# Patient Record
Sex: FEMALE | Race: OTHER | HISPANIC OR LATINO
[De-identification: names, ages, dates, MRNs, and addresses within clinical notes are randomized per-mention and may not be internally consistent; named-entity substitution may affect disease eponyms.]

---

## 2023-01-01 ENCOUNTER — APPOINTMENT (OUTPATIENT)
Dept: INTERNAL MEDICINE | Facility: OTHER | Age: 43
End: 2023-01-01
Payer: SUBSIDIZED

## 2023-01-01 ENCOUNTER — NON-APPOINTMENT (OUTPATIENT)
Age: 43
End: 2023-01-01

## 2023-01-01 ENCOUNTER — APPOINTMENT (OUTPATIENT)
Dept: RHEUMATOLOGY | Facility: CLINIC | Age: 43
End: 2023-01-01

## 2023-01-01 ENCOUNTER — APPOINTMENT (OUTPATIENT)
Dept: INTERNAL MEDICINE | Facility: OTHER | Age: 43
End: 2023-01-01
Payer: MEDICAID

## 2023-01-01 ENCOUNTER — APPOINTMENT (OUTPATIENT)
Dept: MAMMOGRAPHY | Facility: HOSPITAL | Age: 43
End: 2023-01-01

## 2023-01-01 VITALS
HEART RATE: 102 BPM | SYSTOLIC BLOOD PRESSURE: 125 MMHG | RESPIRATION RATE: 15 BRPM | OXYGEN SATURATION: 98 % | HEIGHT: 68 IN | WEIGHT: 224 LBS | BODY MASS INDEX: 33.95 KG/M2 | DIASTOLIC BLOOD PRESSURE: 80 MMHG

## 2023-01-01 VITALS
HEART RATE: 72 BPM | OXYGEN SATURATION: 98 % | SYSTOLIC BLOOD PRESSURE: 135 MMHG | BODY MASS INDEX: 34.8 KG/M2 | HEIGHT: 69 IN | DIASTOLIC BLOOD PRESSURE: 85 MMHG | WEIGHT: 235 LBS | RESPIRATION RATE: 18 BRPM

## 2023-01-01 DIAGNOSIS — Z63.5 DISRUPTION OF FAMILY BY SEPARATION AND DIVORCE: ICD-10-CM

## 2023-01-01 DIAGNOSIS — M05.20 RHEUMATOID VASCULITIS WITH RHEUMATOID ARTHRITIS OF UNSPECIFIED SITE: ICD-10-CM

## 2023-01-01 DIAGNOSIS — I01.1 ACUTE RHEUMATIC ENDOCARDITIS: ICD-10-CM

## 2023-01-01 DIAGNOSIS — M06.9 RHEUMATOID ARTHRITIS, UNSPECIFIED: ICD-10-CM

## 2023-01-01 DIAGNOSIS — Z78.9 OTHER SPECIFIED HEALTH STATUS: ICD-10-CM

## 2023-01-01 PROCEDURE — 99204 OFFICE O/P NEW MOD 45 MIN: CPT

## 2023-01-01 PROCEDURE — ZZZZZ: CPT

## 2023-01-01 RX ORDER — LORATADINE 5 MG
17 TABLET,CHEWABLE ORAL
Qty: 1 | Refills: 0 | Status: ACTIVE | COMMUNITY
Start: 2023-01-01 | End: 1900-01-01

## 2023-01-01 RX ORDER — VITAMIN K2 90 MCG
1000 CAPSULE ORAL
Qty: 90 | Refills: 0 | Status: ACTIVE | COMMUNITY
Start: 2023-01-01 | End: 1900-01-01

## 2023-01-01 SDOH — SOCIAL STABILITY - SOCIAL INSECURITY: DISRUPTION OF FAMILY BY SEPARATION AND DIVORCE: Z63.5

## 2023-09-21 PROBLEM — M05.20 RHEUMATOID ARTERITIS: Status: ACTIVE | Noted: 2023-01-01

## 2023-09-21 PROBLEM — Z78.9 DOES NOT USE ILLICIT DRUGS: Status: ACTIVE | Noted: 2023-01-01

## 2023-09-21 PROBLEM — Z63.5 DIVORCED: Status: ACTIVE | Noted: 2023-01-01

## 2023-09-21 PROBLEM — I01.1 RHEUMATOID AORTITIS: Status: ACTIVE | Noted: 2023-01-01

## 2023-10-23 PROBLEM — M06.9 RHEUMATOID ARTHRITIS: Status: ACTIVE | Noted: 2023-01-01

## 2024-01-01 ENCOUNTER — NON-APPOINTMENT (OUTPATIENT)
Age: 44
End: 2024-01-01

## 2024-01-01 ENCOUNTER — INPATIENT (INPATIENT)
Facility: HOSPITAL | Age: 44
LOS: 2 days | DRG: 917 | End: 2024-05-22
Attending: INTERNAL MEDICINE | Admitting: INTERNAL MEDICINE
Payer: MEDICAID

## 2024-01-01 ENCOUNTER — APPOINTMENT (OUTPATIENT)
Dept: INTERNAL MEDICINE | Facility: OTHER | Age: 44
End: 2024-01-01
Payer: SUBSIDIZED

## 2024-01-01 VITALS — DIASTOLIC BLOOD PRESSURE: 40 MMHG | HEART RATE: 73 BPM | SYSTOLIC BLOOD PRESSURE: 76 MMHG | OXYGEN SATURATION: 91 %

## 2024-01-01 VITALS
HEIGHT: 68 IN | TEMPERATURE: 98.4 F | SYSTOLIC BLOOD PRESSURE: 125 MMHG | RESPIRATION RATE: 14 BRPM | HEART RATE: 84 BPM | OXYGEN SATURATION: 97 % | DIASTOLIC BLOOD PRESSURE: 80 MMHG

## 2024-01-01 VITALS
DIASTOLIC BLOOD PRESSURE: 64 MMHG | OXYGEN SATURATION: 99 % | HEART RATE: 83 BPM | RESPIRATION RATE: 14 BRPM | SYSTOLIC BLOOD PRESSURE: 120 MMHG

## 2024-01-01 VITALS
DIASTOLIC BLOOD PRESSURE: 85 MMHG | TEMPERATURE: 97.9 F | OXYGEN SATURATION: 98 % | HEART RATE: 85 BPM | RESPIRATION RATE: 14 BRPM | SYSTOLIC BLOOD PRESSURE: 130 MMHG

## 2024-01-01 DIAGNOSIS — Z00.00 ENCOUNTER FOR GENERAL ADULT MEDICAL EXAMINATION W/OUT ABNORMAL FINDINGS: ICD-10-CM

## 2024-01-01 DIAGNOSIS — J45.909 UNSPECIFIED ASTHMA, UNCOMPLICATED: ICD-10-CM

## 2024-01-01 DIAGNOSIS — I10 ESSENTIAL (PRIMARY) HYPERTENSION: ICD-10-CM

## 2024-01-01 DIAGNOSIS — S76.011A STRAIN OF MUSCLE, FASCIA AND TENDON OF RIGHT HIP, INITIAL ENCOUNTER: ICD-10-CM

## 2024-01-01 DIAGNOSIS — K59.00 CONSTIPATION, UNSPECIFIED: ICD-10-CM

## 2024-01-01 DIAGNOSIS — E78.5 HYPERLIPIDEMIA, UNSPECIFIED: ICD-10-CM

## 2024-01-01 DIAGNOSIS — K21.9 GASTRO-ESOPHAGEAL REFLUX DISEASE W/OUT ESOPHAGITIS: ICD-10-CM

## 2024-01-01 DIAGNOSIS — M79.7 FIBROMYALGIA: ICD-10-CM

## 2024-01-01 DIAGNOSIS — D50.9 IRON DEFICIENCY ANEMIA, UNSPECIFIED: ICD-10-CM

## 2024-01-01 DIAGNOSIS — Z11.3 ENCOUNTER FOR SCREENING FOR INFECTIONS WITH A PREDOMINANTLY SEXUAL MODE OF TRANSMISSION: ICD-10-CM

## 2024-01-01 DIAGNOSIS — G47.00 INSOMNIA, UNSPECIFIED: ICD-10-CM

## 2024-01-01 DIAGNOSIS — F31.9 BIPOLAR DISORDER, UNSPECIFIED: ICD-10-CM

## 2024-01-01 DIAGNOSIS — F41.9 ANXIETY DISORDER, UNSPECIFIED: ICD-10-CM

## 2024-01-01 DIAGNOSIS — F32.A ANXIETY DISORDER, UNSPECIFIED: ICD-10-CM

## 2024-01-01 DIAGNOSIS — J02.9 ACUTE PHARYNGITIS, UNSPECIFIED: ICD-10-CM

## 2024-01-01 LAB
-  BLOOD PCR PANEL: SIGNIFICANT CHANGE UP
-  STAPHYLOCOCCUS EPIDERMIDIS: SIGNIFICANT CHANGE UP
A1C WITH ESTIMATED AVERAGE GLUCOSE RESULT: 5.1 % — SIGNIFICANT CHANGE UP (ref 4–5.6)
ADD ON TEST-SPECIMEN IN LAB: SIGNIFICANT CHANGE UP
ALBUMIN SERPL ELPH-MCNC: 2.4 G/DL — LOW (ref 3.3–5)
ALBUMIN SERPL ELPH-MCNC: 2.7 G/DL — LOW (ref 3.3–5)
ALBUMIN SERPL ELPH-MCNC: 2.8 G/DL — LOW (ref 3.3–5)
ALBUMIN SERPL ELPH-MCNC: 3.1 G/DL — LOW (ref 3.3–5)
ALBUMIN SERPL ELPH-MCNC: 3.2 G/DL — LOW (ref 3.3–5)
ALBUMIN SERPL ELPH-MCNC: 3.3 G/DL — SIGNIFICANT CHANGE UP (ref 3.3–5)
ALBUMIN SERPL ELPH-MCNC: 3.3 G/DL — SIGNIFICANT CHANGE UP (ref 3.3–5)
ALBUMIN SERPL ELPH-MCNC: 4.1 G/DL — SIGNIFICANT CHANGE UP (ref 3.3–5)
ALP SERPL-CCNC: 61 U/L — SIGNIFICANT CHANGE UP (ref 40–120)
ALP SERPL-CCNC: 62 U/L — SIGNIFICANT CHANGE UP (ref 40–120)
ALP SERPL-CCNC: 62 U/L — SIGNIFICANT CHANGE UP (ref 40–120)
ALP SERPL-CCNC: 71 U/L — SIGNIFICANT CHANGE UP (ref 40–120)
ALP SERPL-CCNC: 78 U/L — SIGNIFICANT CHANGE UP (ref 40–120)
ALP SERPL-CCNC: 79 U/L — SIGNIFICANT CHANGE UP (ref 40–120)
ALP SERPL-CCNC: 81 U/L — SIGNIFICANT CHANGE UP (ref 40–120)
ALP SERPL-CCNC: 99 U/L — SIGNIFICANT CHANGE UP (ref 40–120)
ALT FLD-CCNC: 183 U/L — HIGH (ref 10–45)
ALT FLD-CCNC: 200 U/L — HIGH (ref 10–45)
ALT FLD-CCNC: 204 U/L — HIGH (ref 10–45)
ALT FLD-CCNC: 262 U/L — HIGH (ref 10–45)
ALT FLD-CCNC: 274 U/L — HIGH (ref 10–45)
ALT FLD-CCNC: 280 U/L — HIGH (ref 10–45)
ALT FLD-CCNC: 330 U/L — HIGH (ref 10–45)
ALT FLD-CCNC: 446 U/L — HIGH (ref 10–45)
AMMONIA BLD-MCNC: 104 UMOL/L — HIGH (ref 11–55)
AMMONIA BLD-MCNC: 79 UMOL/L — HIGH (ref 11–55)
AMORPH URATE CRY # URNS: PRESENT
AMPHET UR-MCNC: NEGATIVE — SIGNIFICANT CHANGE UP
AMYLASE P1 CFR SERPL: 81 U/L — SIGNIFICANT CHANGE UP (ref 25–125)
ANION GAP SERPL CALC-SCNC: 10 MMOL/L — SIGNIFICANT CHANGE UP (ref 5–17)
ANION GAP SERPL CALC-SCNC: 10 MMOL/L — SIGNIFICANT CHANGE UP (ref 5–17)
ANION GAP SERPL CALC-SCNC: 12 MMOL/L — SIGNIFICANT CHANGE UP (ref 5–17)
ANION GAP SERPL CALC-SCNC: 15 MMOL/L — SIGNIFICANT CHANGE UP (ref 5–17)
ANION GAP SERPL CALC-SCNC: 15 MMOL/L — SIGNIFICANT CHANGE UP (ref 5–17)
ANION GAP SERPL CALC-SCNC: 17 MMOL/L — SIGNIFICANT CHANGE UP (ref 5–17)
ANION GAP SERPL CALC-SCNC: 17 MMOL/L — SIGNIFICANT CHANGE UP (ref 5–17)
ANION GAP SERPL CALC-SCNC: 6 MMOL/L — SIGNIFICANT CHANGE UP (ref 5–17)
ANION GAP SERPL CALC-SCNC: 6 MMOL/L — SIGNIFICANT CHANGE UP (ref 5–17)
ANION GAP SERPL CALC-SCNC: 7 MMOL/L — SIGNIFICANT CHANGE UP (ref 5–17)
ANION GAP SERPL CALC-SCNC: 8 MMOL/L — SIGNIFICANT CHANGE UP (ref 5–17)
ANION GAP SERPL CALC-SCNC: 9 MMOL/L — SIGNIFICANT CHANGE UP (ref 5–17)
ANISOCYTOSIS BLD QL: SIGNIFICANT CHANGE UP
ANISOCYTOSIS BLD QL: SIGNIFICANT CHANGE UP
ANISOCYTOSIS BLD QL: SLIGHT — SIGNIFICANT CHANGE UP
APAP SERPL-MCNC: <5 UG/ML — LOW (ref 10–30)
APPEARANCE UR: ABNORMAL
APPEARANCE UR: ABNORMAL
APPEARANCE UR: CLEAR — SIGNIFICANT CHANGE UP
APTT BLD: 26.7 SEC — SIGNIFICANT CHANGE UP (ref 24.5–35.6)
APTT BLD: 29.1 SEC — SIGNIFICANT CHANGE UP (ref 24.5–35.6)
APTT BLD: 29.3 SEC — SIGNIFICANT CHANGE UP (ref 24.5–35.6)
AST SERPL-CCNC: 257 U/L — HIGH (ref 10–40)
AST SERPL-CCNC: 259 U/L — HIGH (ref 10–40)
AST SERPL-CCNC: 293 U/L — HIGH (ref 10–40)
AST SERPL-CCNC: 331 U/L — HIGH (ref 10–40)
AST SERPL-CCNC: 408 U/L — HIGH (ref 10–40)
AST SERPL-CCNC: 425 U/L — HIGH (ref 10–40)
AST SERPL-CCNC: 504 U/L — HIGH (ref 10–40)
AST SERPL-CCNC: 583 U/L — HIGH (ref 10–40)
BACTERIA # UR AUTO: ABNORMAL /HPF
BACTERIA # UR AUTO: NEGATIVE /HPF — SIGNIFICANT CHANGE UP
BACTERIA # UR AUTO: NEGATIVE /HPF — SIGNIFICANT CHANGE UP
BARBITURATES UR SCN-MCNC: NEGATIVE — SIGNIFICANT CHANGE UP
BASE EXCESS BLDA CALC-SCNC: -1.8 MMOL/L — SIGNIFICANT CHANGE UP (ref -2–3)
BASE EXCESS BLDA CALC-SCNC: -2.2 MMOL/L — LOW (ref -2–3)
BASE EXCESS BLDA CALC-SCNC: -2.8 MMOL/L — LOW (ref -2–3)
BASE EXCESS BLDA CALC-SCNC: -3.6 MMOL/L — LOW (ref -2–3)
BASE EXCESS BLDA CALC-SCNC: -4 MMOL/L — LOW (ref -2–3)
BASE EXCESS BLDA CALC-SCNC: -4.3 MMOL/L — LOW (ref -2–3)
BASE EXCESS BLDA CALC-SCNC: -9.8 MMOL/L — LOW (ref -2–3)
BASE EXCESS BLDV CALC-SCNC: -13.5 MMOL/L — LOW (ref -2–3)
BASOPHILS # BLD AUTO: 0 K/UL — SIGNIFICANT CHANGE UP (ref 0–0.2)
BASOPHILS # BLD AUTO: 0.04 K/UL — SIGNIFICANT CHANGE UP (ref 0–0.2)
BASOPHILS # BLD AUTO: 0.11 K/UL — SIGNIFICANT CHANGE UP (ref 0–0.2)
BASOPHILS NFR BLD AUTO: 0 % — SIGNIFICANT CHANGE UP (ref 0–2)
BASOPHILS NFR BLD AUTO: 0.2 % — SIGNIFICANT CHANGE UP (ref 0–2)
BASOPHILS NFR BLD AUTO: 0.9 % — SIGNIFICANT CHANGE UP (ref 0–2)
BENZODIAZ UR-MCNC: NEGATIVE — SIGNIFICANT CHANGE UP
BILIRUB DIRECT SERPL-MCNC: <0.2 MG/DL — SIGNIFICANT CHANGE UP (ref 0–0.3)
BILIRUB INDIRECT FLD-MCNC: SIGNIFICANT CHANGE UP (ref 0.2–1)
BILIRUB SERPL-MCNC: 0.2 MG/DL — SIGNIFICANT CHANGE UP (ref 0.2–1.2)
BILIRUB SERPL-MCNC: 0.3 MG/DL — SIGNIFICANT CHANGE UP (ref 0.2–1.2)
BILIRUB SERPL-MCNC: 0.4 MG/DL — SIGNIFICANT CHANGE UP (ref 0.2–1.2)
BILIRUB SERPL-MCNC: 0.4 MG/DL — SIGNIFICANT CHANGE UP (ref 0.2–1.2)
BILIRUB SERPL-MCNC: <0.2 MG/DL — SIGNIFICANT CHANGE UP (ref 0.2–1.2)
BILIRUB UR-MCNC: NEGATIVE — SIGNIFICANT CHANGE UP
BLD GP AB SCN SERPL QL: NEGATIVE — SIGNIFICANT CHANGE UP
BLD GP AB SCN SERPL QL: NEGATIVE — SIGNIFICANT CHANGE UP
BUN SERPL-MCNC: 12 MG/DL — SIGNIFICANT CHANGE UP (ref 7–23)
BUN SERPL-MCNC: 13 MG/DL — SIGNIFICANT CHANGE UP (ref 7–23)
BUN SERPL-MCNC: 13 MG/DL — SIGNIFICANT CHANGE UP (ref 7–23)
BUN SERPL-MCNC: 14 MG/DL — SIGNIFICANT CHANGE UP (ref 7–23)
BUN SERPL-MCNC: 15 MG/DL — SIGNIFICANT CHANGE UP (ref 7–23)
BUN SERPL-MCNC: 16 MG/DL — SIGNIFICANT CHANGE UP (ref 7–23)
BUN SERPL-MCNC: 18 MG/DL — SIGNIFICANT CHANGE UP (ref 7–23)
BUN SERPL-MCNC: 28 MG/DL — HIGH (ref 7–23)
BUN SERPL-MCNC: 28 MG/DL — HIGH (ref 7–23)
BUN SERPL-MCNC: 30 MG/DL — HIGH (ref 7–23)
BUN SERPL-MCNC: 32 MG/DL — HIGH (ref 7–23)
BUN SERPL-MCNC: 35 MG/DL — HIGH (ref 7–23)
BURR CELLS BLD QL SMEAR: PRESENT — SIGNIFICANT CHANGE UP
BURR CELLS BLD QL SMEAR: PRESENT — SIGNIFICANT CHANGE UP
CALCIUM SERPL-MCNC: 6.7 MG/DL — LOW (ref 8.4–10.5)
CALCIUM SERPL-MCNC: 7.1 MG/DL — LOW (ref 8.4–10.5)
CALCIUM SERPL-MCNC: 7.4 MG/DL — LOW (ref 8.4–10.5)
CALCIUM SERPL-MCNC: 7.4 MG/DL — LOW (ref 8.4–10.5)
CALCIUM SERPL-MCNC: 7.5 MG/DL — LOW (ref 8.4–10.5)
CALCIUM SERPL-MCNC: 7.6 MG/DL — LOW (ref 8.4–10.5)
CALCIUM SERPL-MCNC: 7.9 MG/DL — LOW (ref 8.4–10.5)
CALCIUM SERPL-MCNC: 7.9 MG/DL — LOW (ref 8.4–10.5)
CALCIUM SERPL-MCNC: 8.6 MG/DL — SIGNIFICANT CHANGE UP (ref 8.4–10.5)
CALCIUM SERPL-MCNC: 8.6 MG/DL — SIGNIFICANT CHANGE UP (ref 8.4–10.5)
CAST: 35 /LPF — HIGH (ref 0–4)
CAST: 7 /LPF — HIGH (ref 0–4)
CAST: 8 /LPF — HIGH (ref 0–4)
CHLORIDE SERPL-SCNC: 101 MMOL/L — SIGNIFICANT CHANGE UP (ref 96–108)
CHLORIDE SERPL-SCNC: 106 MMOL/L — SIGNIFICANT CHANGE UP (ref 96–108)
CHLORIDE SERPL-SCNC: 110 MMOL/L — HIGH (ref 96–108)
CHLORIDE SERPL-SCNC: 112 MMOL/L — HIGH (ref 96–108)
CHLORIDE SERPL-SCNC: 119 MMOL/L — HIGH (ref 96–108)
CHLORIDE SERPL-SCNC: 120 MMOL/L — HIGH (ref 96–108)
CHLORIDE SERPL-SCNC: 120 MMOL/L — HIGH (ref 96–108)
CHLORIDE SERPL-SCNC: 124 MMOL/L — HIGH (ref 96–108)
CHLORIDE SERPL-SCNC: 127 MMOL/L — HIGH (ref 96–108)
CHLORIDE SERPL-SCNC: 128 MMOL/L — HIGH (ref 96–108)
CHLORIDE SERPL-SCNC: 130 MMOL/L — HIGH (ref 96–108)
CHLORIDE SERPL-SCNC: 131 MMOL/L — HIGH (ref 96–108)
CHLORIDE SERPL-SCNC: 132 MMOL/L — HIGH (ref 96–108)
CHLORIDE SERPL-SCNC: 98 MMOL/L — SIGNIFICANT CHANGE UP (ref 96–108)
CK MB CFR SERPL CALC: 30.9 NG/ML — HIGH (ref 0–6.7)
CK SERPL-CCNC: 200 U/L — HIGH (ref 25–170)
CK SERPL-CCNC: 7428 U/L — HIGH (ref 25–170)
CO2 BLDA-SCNC: 18 MMOL/L — LOW (ref 19–24)
CO2 BLDA-SCNC: 22 MMOL/L — SIGNIFICANT CHANGE UP (ref 19–24)
CO2 BLDA-SCNC: 24 MMOL/L — SIGNIFICANT CHANGE UP (ref 19–24)
CO2 BLDA-SCNC: 24 MMOL/L — SIGNIFICANT CHANGE UP (ref 19–24)
CO2 BLDA-SCNC: 25 MMOL/L — HIGH (ref 19–24)
CO2 BLDA-SCNC: 28 MMOL/L — HIGH (ref 19–24)
CO2 BLDA-SCNC: 29 MMOL/L — HIGH (ref 19–24)
CO2 BLDV-SCNC: 21.8 MMOL/L — LOW (ref 22–26)
CO2 SERPL-SCNC: 16 MMOL/L — LOW (ref 22–31)
CO2 SERPL-SCNC: 17 MMOL/L — LOW (ref 22–31)
CO2 SERPL-SCNC: 17 MMOL/L — LOW (ref 22–31)
CO2 SERPL-SCNC: 18 MMOL/L — LOW (ref 22–31)
CO2 SERPL-SCNC: 20 MMOL/L — LOW (ref 22–31)
CO2 SERPL-SCNC: 21 MMOL/L — LOW (ref 22–31)
CO2 SERPL-SCNC: 21 MMOL/L — LOW (ref 22–31)
CO2 SERPL-SCNC: 22 MMOL/L — SIGNIFICANT CHANGE UP (ref 22–31)
CO2 SERPL-SCNC: 23 MMOL/L — SIGNIFICANT CHANGE UP (ref 22–31)
CO2 SERPL-SCNC: 23 MMOL/L — SIGNIFICANT CHANGE UP (ref 22–31)
CO2 SERPL-SCNC: 24 MMOL/L — SIGNIFICANT CHANGE UP (ref 22–31)
CO2 SERPL-SCNC: 24 MMOL/L — SIGNIFICANT CHANGE UP (ref 22–31)
COCAINE METAB.OTHER UR-MCNC: POSITIVE
COLOR SPEC: ABNORMAL
COLOR SPEC: YELLOW — SIGNIFICANT CHANGE UP
COLOR SPEC: YELLOW — SIGNIFICANT CHANGE UP
CREAT ?TM UR-MCNC: 60 MG/DL — SIGNIFICANT CHANGE UP
CREAT SERPL-MCNC: 0.72 MG/DL — SIGNIFICANT CHANGE UP (ref 0.5–1.3)
CREAT SERPL-MCNC: 0.73 MG/DL — SIGNIFICANT CHANGE UP (ref 0.5–1.3)
CREAT SERPL-MCNC: 0.75 MG/DL — SIGNIFICANT CHANGE UP (ref 0.5–1.3)
CREAT SERPL-MCNC: 0.76 MG/DL — SIGNIFICANT CHANGE UP (ref 0.5–1.3)
CREAT SERPL-MCNC: 0.77 MG/DL — SIGNIFICANT CHANGE UP (ref 0.5–1.3)
CREAT SERPL-MCNC: 0.79 MG/DL — SIGNIFICANT CHANGE UP (ref 0.5–1.3)
CREAT SERPL-MCNC: 0.8 MG/DL — SIGNIFICANT CHANGE UP (ref 0.5–1.3)
CREAT SERPL-MCNC: 0.85 MG/DL — SIGNIFICANT CHANGE UP (ref 0.5–1.3)
CREAT SERPL-MCNC: 1.06 MG/DL — SIGNIFICANT CHANGE UP (ref 0.5–1.3)
CREAT SERPL-MCNC: 1.07 MG/DL — SIGNIFICANT CHANGE UP (ref 0.5–1.3)
CREAT SERPL-MCNC: 1.11 MG/DL — SIGNIFICANT CHANGE UP (ref 0.5–1.3)
CREAT SERPL-MCNC: 1.11 MG/DL — SIGNIFICANT CHANGE UP (ref 0.5–1.3)
CREAT SERPL-MCNC: 1.31 MG/DL — HIGH (ref 0.5–1.3)
CREAT SERPL-MCNC: 1.58 MG/DL — HIGH (ref 0.5–1.3)
CULTURE RESULTS: ABNORMAL
CULTURE RESULTS: NO GROWTH — SIGNIFICANT CHANGE UP
DACRYOCYTES BLD QL SMEAR: SLIGHT — SIGNIFICANT CHANGE UP
DIFF PNL FLD: ABNORMAL
EGFR: 103 ML/MIN/1.73M2 — SIGNIFICANT CHANGE UP
EGFR: 104 ML/MIN/1.73M2 — SIGNIFICANT CHANGE UP
EGFR: 41 ML/MIN/1.73M2 — LOW
EGFR: 51 ML/MIN/1.73M2 — LOW
EGFR: 62 ML/MIN/1.73M2 — SIGNIFICANT CHANGE UP
EGFR: 62 ML/MIN/1.73M2 — SIGNIFICANT CHANGE UP
EGFR: 65 ML/MIN/1.73M2 — SIGNIFICANT CHANGE UP
EGFR: 66 ML/MIN/1.73M2 — SIGNIFICANT CHANGE UP
EGFR: 86 ML/MIN/1.73M2 — SIGNIFICANT CHANGE UP
EGFR: 92 ML/MIN/1.73M2 — SIGNIFICANT CHANGE UP
EGFR: 93 ML/MIN/1.73M2 — SIGNIFICANT CHANGE UP
EGFR: 96 ML/MIN/1.73M2 — SIGNIFICANT CHANGE UP
EGFR: 98 ML/MIN/1.73M2 — SIGNIFICANT CHANGE UP
EGFR: 99 ML/MIN/1.73M2 — SIGNIFICANT CHANGE UP
EOSINOPHIL # BLD AUTO: 0 K/UL — SIGNIFICANT CHANGE UP (ref 0–0.5)
EOSINOPHIL # BLD AUTO: 0.02 K/UL — SIGNIFICANT CHANGE UP (ref 0–0.5)
EOSINOPHIL # BLD AUTO: 0.3 K/UL — SIGNIFICANT CHANGE UP (ref 0–0.5)
EOSINOPHIL NFR BLD AUTO: 0 % — SIGNIFICANT CHANGE UP (ref 0–6)
EOSINOPHIL NFR BLD AUTO: 0.1 % — SIGNIFICANT CHANGE UP (ref 0–6)
EOSINOPHIL NFR BLD AUTO: 7.1 % — HIGH (ref 0–6)
ESTIMATED AVERAGE GLUCOSE: 100 MG/DL — SIGNIFICANT CHANGE UP (ref 68–114)
ETHANOL SERPL-MCNC: <10 MG/DL — SIGNIFICANT CHANGE UP (ref 0–10)
FIBRINOGEN PPP-MCNC: 659 MG/DL — HIGH (ref 200–445)
FINE GRAN CASTS #/AREA URNS AUTO: PRESENT
FINE GRAN CASTS #/AREA URNS AUTO: PRESENT
GAS PNL BLDA: SIGNIFICANT CHANGE UP
GAS PNL BLDV: SIGNIFICANT CHANGE UP
GAS PNL BLDV: SIGNIFICANT CHANGE UP
GIANT PLATELETS BLD QL SMEAR: PRESENT — SIGNIFICANT CHANGE UP
GLUCOSE BLDC GLUCOMTR-MCNC: 119 MG/DL — HIGH (ref 70–99)
GLUCOSE BLDC GLUCOMTR-MCNC: 164 MG/DL — HIGH (ref 70–99)
GLUCOSE SERPL-MCNC: 114 MG/DL — HIGH (ref 70–99)
GLUCOSE SERPL-MCNC: 124 MG/DL — HIGH (ref 70–99)
GLUCOSE SERPL-MCNC: 138 MG/DL — HIGH (ref 70–99)
GLUCOSE SERPL-MCNC: 141 MG/DL — HIGH (ref 70–99)
GLUCOSE SERPL-MCNC: 142 MG/DL — HIGH (ref 70–99)
GLUCOSE SERPL-MCNC: 152 MG/DL — HIGH (ref 70–99)
GLUCOSE SERPL-MCNC: 159 MG/DL — HIGH (ref 70–99)
GLUCOSE SERPL-MCNC: 169 MG/DL — HIGH (ref 70–99)
GLUCOSE SERPL-MCNC: 171 MG/DL — HIGH (ref 70–99)
GLUCOSE SERPL-MCNC: 185 MG/DL — HIGH (ref 70–99)
GLUCOSE SERPL-MCNC: 205 MG/DL — HIGH (ref 70–99)
GLUCOSE SERPL-MCNC: 206 MG/DL — HIGH (ref 70–99)
GLUCOSE SERPL-MCNC: 208 MG/DL — HIGH (ref 70–99)
GLUCOSE SERPL-MCNC: 287 MG/DL — HIGH (ref 70–99)
GLUCOSE UR QL: 500 MG/DL
GLUCOSE UR QL: NEGATIVE MG/DL — SIGNIFICANT CHANGE UP
GLUCOSE UR QL: NEGATIVE MG/DL — SIGNIFICANT CHANGE UP
GRAM STN FLD: ABNORMAL
GRAM STN FLD: ABNORMAL
HAV IGM SER-ACNC: SIGNIFICANT CHANGE UP
HBV CORE IGM SER-ACNC: SIGNIFICANT CHANGE UP
HBV SURFACE AG SER-ACNC: SIGNIFICANT CHANGE UP
HCG SERPL-ACNC: <1 MIU/ML — SIGNIFICANT CHANGE UP
HCG SERPL-ACNC: <1 MIU/ML — SIGNIFICANT CHANGE UP
HCO3 BLDA-SCNC: 17 MMOL/L — LOW (ref 21–28)
HCO3 BLDA-SCNC: 21 MMOL/L — SIGNIFICANT CHANGE UP (ref 21–28)
HCO3 BLDA-SCNC: 23 MMOL/L — SIGNIFICANT CHANGE UP (ref 21–28)
HCO3 BLDA-SCNC: 23 MMOL/L — SIGNIFICANT CHANGE UP (ref 21–28)
HCO3 BLDA-SCNC: 24 MMOL/L — SIGNIFICANT CHANGE UP (ref 21–28)
HCO3 BLDA-SCNC: 26 MMOL/L — SIGNIFICANT CHANGE UP (ref 21–28)
HCO3 BLDA-SCNC: 27 MMOL/L — SIGNIFICANT CHANGE UP (ref 21–28)
HCO3 BLDV-SCNC: 19 MMOL/L — LOW (ref 22–29)
HCT VFR BLD CALC: 31.3 % — LOW (ref 34.5–45)
HCT VFR BLD CALC: 32.7 % — LOW (ref 34.5–45)
HCT VFR BLD CALC: 35.5 % — SIGNIFICANT CHANGE UP (ref 34.5–45)
HCT VFR BLD CALC: 38.8 % — SIGNIFICANT CHANGE UP (ref 34.5–45)
HCT VFR BLD CALC: 43.6 % — SIGNIFICANT CHANGE UP (ref 34.5–45)
HCT VFR BLD CALC: 43.8 % — SIGNIFICANT CHANGE UP (ref 34.5–45)
HCT VFR BLD CALC: 47.8 % — HIGH (ref 34.5–45)
HCT VFR BLD CALC: 50.7 % — HIGH (ref 34.5–45)
HCV AB S/CO SERPL IA: 0.05 S/CO — SIGNIFICANT CHANGE UP
HCV AB SERPL-IMP: SIGNIFICANT CHANGE UP
HGB BLD-MCNC: 10.6 G/DL — LOW (ref 11.5–15.5)
HGB BLD-MCNC: 12.1 G/DL — SIGNIFICANT CHANGE UP (ref 11.5–15.5)
HGB BLD-MCNC: 13.1 G/DL — SIGNIFICANT CHANGE UP (ref 11.5–15.5)
HGB BLD-MCNC: 13.8 G/DL — SIGNIFICANT CHANGE UP (ref 11.5–15.5)
HGB BLD-MCNC: 14.8 G/DL — SIGNIFICANT CHANGE UP (ref 11.5–15.5)
HGB BLD-MCNC: 15.6 G/DL — HIGH (ref 11.5–15.5)
HGB BLD-MCNC: 9.6 G/DL — LOW (ref 11.5–15.5)
HGB BLD-MCNC: 9.9 G/DL — LOW (ref 11.5–15.5)
HOROWITZ INDEX BLDA+IHG-RTO: 100 — SIGNIFICANT CHANGE UP
IMM GRANULOCYTES NFR BLD AUTO: 0.6 % — SIGNIFICANT CHANGE UP (ref 0–0.9)
INR BLD: 0.86 — SIGNIFICANT CHANGE UP (ref 0.85–1.18)
INR BLD: 1.01 — SIGNIFICANT CHANGE UP (ref 0.85–1.18)
INR BLD: 1.01 — SIGNIFICANT CHANGE UP (ref 0.85–1.18)
KETONES UR-MCNC: ABNORMAL MG/DL
KETONES UR-MCNC: NEGATIVE MG/DL — SIGNIFICANT CHANGE UP
KETONES UR-MCNC: NEGATIVE MG/DL — SIGNIFICANT CHANGE UP
LACTATE SERPL-SCNC: 1.5 MMOL/L — SIGNIFICANT CHANGE UP (ref 0.5–2)
LACTATE SERPL-SCNC: 2 MMOL/L — SIGNIFICANT CHANGE UP (ref 0.5–2)
LACTATE SERPL-SCNC: 2.4 MMOL/L — HIGH (ref 0.5–2)
LACTATE SERPL-SCNC: 3.5 MMOL/L — HIGH (ref 0.5–2)
LACTATE SERPL-SCNC: 4 MMOL/L — CRITICAL HIGH (ref 0.5–2)
LACTATE SERPL-SCNC: 6.6 MMOL/L — CRITICAL HIGH (ref 0.5–2)
LACTATE SERPL-SCNC: 7.2 MMOL/L — CRITICAL HIGH (ref 0.5–2)
LDH SERPL L TO P-CCNC: 763 U/L — HIGH (ref 50–242)
LEUKOCYTE ESTERASE UR-ACNC: NEGATIVE — SIGNIFICANT CHANGE UP
LIDOCAIN IGE QN: 28 U/L — SIGNIFICANT CHANGE UP (ref 7–60)
LIDOCAIN IGE QN: 48 U/L — SIGNIFICANT CHANGE UP (ref 7–60)
LITHIUM SERPL-MCNC: 0.26 MMOL/L — LOW (ref 0.6–1.2)
LYMPHOCYTES # BLD AUTO: 0.23 K/UL — LOW (ref 1–3.3)
LYMPHOCYTES # BLD AUTO: 0.52 K/UL — LOW (ref 1–3.3)
LYMPHOCYTES # BLD AUTO: 0.66 K/UL — LOW (ref 1–3.3)
LYMPHOCYTES # BLD AUTO: 0.77 K/UL — LOW (ref 1–3.3)
LYMPHOCYTES # BLD AUTO: 1.06 K/UL — SIGNIFICANT CHANGE UP (ref 1–3.3)
LYMPHOCYTES # BLD AUTO: 1.7 % — LOW (ref 13–44)
LYMPHOCYTES # BLD AUTO: 2.93 K/UL — SIGNIFICANT CHANGE UP (ref 1–3.3)
LYMPHOCYTES # BLD AUTO: 3.5 % — LOW (ref 13–44)
LYMPHOCYTES # BLD AUTO: 3.8 % — LOW (ref 13–44)
LYMPHOCYTES # BLD AUTO: 6.1 % — LOW (ref 13–44)
LYMPHOCYTES # BLD AUTO: 6.2 % — LOW (ref 13–44)
LYMPHOCYTES # BLD AUTO: 69 % — HIGH (ref 13–44)
MAGNESIUM SERPL-MCNC: 1.8 MG/DL — SIGNIFICANT CHANGE UP (ref 1.6–2.6)
MAGNESIUM SERPL-MCNC: 1.9 MG/DL — SIGNIFICANT CHANGE UP (ref 1.6–2.6)
MAGNESIUM SERPL-MCNC: 2 MG/DL — SIGNIFICANT CHANGE UP (ref 1.6–2.6)
MAGNESIUM SERPL-MCNC: 2.2 MG/DL — SIGNIFICANT CHANGE UP (ref 1.6–2.6)
MAGNESIUM SERPL-MCNC: 2.4 MG/DL — SIGNIFICANT CHANGE UP (ref 1.6–2.6)
MAGNESIUM SERPL-MCNC: 2.4 MG/DL — SIGNIFICANT CHANGE UP (ref 1.6–2.6)
MAGNESIUM SERPL-MCNC: 2.6 MG/DL — SIGNIFICANT CHANGE UP (ref 1.6–2.6)
MAGNESIUM SERPL-MCNC: 2.7 MG/DL — HIGH (ref 1.6–2.6)
MAGNESIUM SERPL-MCNC: 2.8 MG/DL — HIGH (ref 1.6–2.6)
MANUAL SMEAR VERIFICATION: SIGNIFICANT CHANGE UP
MCHC RBC-ENTMCNC: 26.2 PG — LOW (ref 27–34)
MCHC RBC-ENTMCNC: 26.3 PG — LOW (ref 27–34)
MCHC RBC-ENTMCNC: 26.4 PG — LOW (ref 27–34)
MCHC RBC-ENTMCNC: 26.4 PG — LOW (ref 27–34)
MCHC RBC-ENTMCNC: 26.6 PG — LOW (ref 27–34)
MCHC RBC-ENTMCNC: 26.6 PG — LOW (ref 27–34)
MCHC RBC-ENTMCNC: 29.9 GM/DL — LOW (ref 32–36)
MCHC RBC-ENTMCNC: 30 GM/DL — LOW (ref 32–36)
MCHC RBC-ENTMCNC: 30.3 GM/DL — LOW (ref 32–36)
MCHC RBC-ENTMCNC: 30.7 GM/DL — LOW (ref 32–36)
MCHC RBC-ENTMCNC: 30.8 GM/DL — LOW (ref 32–36)
MCHC RBC-ENTMCNC: 31 GM/DL — LOW (ref 32–36)
MCHC RBC-ENTMCNC: 31.2 GM/DL — LOW (ref 32–36)
MCHC RBC-ENTMCNC: 31.5 GM/DL — LOW (ref 32–36)
MCV RBC AUTO: 83.6 FL — SIGNIFICANT CHANGE UP (ref 80–100)
MCV RBC AUTO: 85.1 FL — SIGNIFICANT CHANGE UP (ref 80–100)
MCV RBC AUTO: 85.2 FL — SIGNIFICANT CHANGE UP (ref 80–100)
MCV RBC AUTO: 85.3 FL — SIGNIFICANT CHANGE UP (ref 80–100)
MCV RBC AUTO: 86.7 FL — SIGNIFICANT CHANGE UP (ref 80–100)
MCV RBC AUTO: 86.7 FL — SIGNIFICANT CHANGE UP (ref 80–100)
MCV RBC AUTO: 87.9 FL — SIGNIFICANT CHANGE UP (ref 80–100)
MCV RBC AUTO: 88.5 FL — SIGNIFICANT CHANGE UP (ref 80–100)
METAMYELOCYTES # FLD: 0.9 % — HIGH (ref 0–0)
METHADONE UR-MCNC: NEGATIVE — SIGNIFICANT CHANGE UP
METHOD TYPE: SIGNIFICANT CHANGE UP
METHOD TYPE: SIGNIFICANT CHANGE UP
MICROCYTES BLD QL: SIGNIFICANT CHANGE UP
MICROCYTES BLD QL: SIGNIFICANT CHANGE UP
MICROCYTES BLD QL: SLIGHT — SIGNIFICANT CHANGE UP
MONOCYTES # BLD AUTO: 0.11 K/UL — SIGNIFICANT CHANGE UP (ref 0–0.9)
MONOCYTES # BLD AUTO: 0.26 K/UL — SIGNIFICANT CHANGE UP (ref 0–0.9)
MONOCYTES # BLD AUTO: 0.52 K/UL — SIGNIFICANT CHANGE UP (ref 0–0.9)
MONOCYTES # BLD AUTO: 0.59 K/UL — SIGNIFICANT CHANGE UP (ref 0–0.9)
MONOCYTES # BLD AUTO: 1.04 K/UL — HIGH (ref 0–0.9)
MONOCYTES # BLD AUTO: 1.45 K/UL — HIGH (ref 0–0.9)
MONOCYTES NFR BLD AUTO: 0.9 % — LOW (ref 2–14)
MONOCYTES NFR BLD AUTO: 3.5 % — SIGNIFICANT CHANGE UP (ref 2–14)
MONOCYTES NFR BLD AUTO: 4.4 % — SIGNIFICANT CHANGE UP (ref 2–14)
MONOCYTES NFR BLD AUTO: 6 % — SIGNIFICANT CHANGE UP (ref 2–14)
MONOCYTES NFR BLD AUTO: 6.2 % — SIGNIFICANT CHANGE UP (ref 2–14)
MONOCYTES NFR BLD AUTO: 8.4 % — SIGNIFICANT CHANGE UP (ref 2–14)
MRSA PCR RESULT.: POSITIVE
MYELOCYTES NFR BLD: 1.8 % — HIGH (ref 0–0)
NEUTROPHILS # BLD AUTO: 0.64 K/UL — LOW (ref 1.8–7.4)
NEUTROPHILS # BLD AUTO: 11.44 K/UL — HIGH (ref 1.8–7.4)
NEUTROPHILS # BLD AUTO: 12.61 K/UL — HIGH (ref 1.8–7.4)
NEUTROPHILS # BLD AUTO: 13.75 K/UL — HIGH (ref 1.8–7.4)
NEUTROPHILS # BLD AUTO: 14.98 K/UL — HIGH (ref 1.8–7.4)
NEUTROPHILS # BLD AUTO: 15.23 K/UL — HIGH (ref 1.8–7.4)
NEUTROPHILS NFR BLD AUTO: 14.1 % — LOW (ref 43–77)
NEUTROPHILS NFR BLD AUTO: 72.4 % — SIGNIFICANT CHANGE UP (ref 43–77)
NEUTROPHILS NFR BLD AUTO: 82.5 % — HIGH (ref 43–77)
NEUTROPHILS NFR BLD AUTO: 84.9 % — HIGH (ref 43–77)
NEUTROPHILS NFR BLD AUTO: 86.9 % — HIGH (ref 43–77)
NEUTROPHILS NFR BLD AUTO: 89.6 % — HIGH (ref 43–77)
NEUTS BAND # BLD: 0.9 % — SIGNIFICANT CHANGE UP (ref 0–8)
NEUTS BAND # BLD: 10.5 % — HIGH (ref 0–8)
NEUTS BAND # BLD: 15.5 % — HIGH (ref 0–8)
NEUTS BAND # BLD: 4.3 % — SIGNIFICANT CHANGE UP (ref 0–8)
NEUTS BAND # BLD: 7.1 % — SIGNIFICANT CHANGE UP (ref 0–8)
NITRITE UR-MCNC: NEGATIVE — SIGNIFICANT CHANGE UP
NRBC # BLD: 0 /100 WBCS — SIGNIFICANT CHANGE UP (ref 0–0)
OPIATES UR-MCNC: NEGATIVE — SIGNIFICANT CHANGE UP
ORGANISM # SPEC MICROSCOPIC CNT: ABNORMAL
ORGANISM # SPEC MICROSCOPIC CNT: SIGNIFICANT CHANGE UP
OSMOLALITY SERPL: 312 MOSM/KG — HIGH (ref 275–300)
OSMOLALITY SERPL: 334 MOSM/KG — HIGH (ref 275–300)
OSMOLALITY UR: 450 MOSM/KG — SIGNIFICANT CHANGE UP (ref 300–900)
OSMOLALITY UR: 486 MOSM/KG — SIGNIFICANT CHANGE UP (ref 300–900)
OVALOCYTES BLD QL SMEAR: SLIGHT — SIGNIFICANT CHANGE UP
PCO2 BLDA: 34 MMHG — SIGNIFICANT CHANGE UP (ref 32–45)
PCO2 BLDA: 40 MMHG — SIGNIFICANT CHANGE UP (ref 32–45)
PCO2 BLDA: 41 MMHG — SIGNIFICANT CHANGE UP (ref 32–45)
PCO2 BLDA: 42 MMHG — SIGNIFICANT CHANGE UP (ref 32–45)
PCO2 BLDA: 46 MMHG — HIGH (ref 32–45)
PCO2 BLDA: 60 MMHG — HIGH (ref 32–45)
PCO2 BLDA: 78 MMHG — CRITICAL HIGH (ref 32–45)
PCO2 BLDV: 80 MMHG — HIGH (ref 39–42)
PCP SPEC-MCNC: SIGNIFICANT CHANGE UP
PCP UR-MCNC: NEGATIVE — SIGNIFICANT CHANGE UP
PH BLDA: 7.14 — CRITICAL LOW (ref 7.35–7.45)
PH BLDA: 7.24 — LOW (ref 7.35–7.45)
PH BLDA: 7.24 — LOW (ref 7.35–7.45)
PH BLDA: 7.3 — LOW (ref 7.35–7.45)
PH BLDA: 7.36 — SIGNIFICANT CHANGE UP (ref 7.35–7.45)
PH BLDA: 7.36 — SIGNIFICANT CHANGE UP (ref 7.35–7.45)
PH BLDA: 7.39 — SIGNIFICANT CHANGE UP (ref 7.35–7.45)
PH BLDV: 6.99 — CRITICAL LOW (ref 7.32–7.43)
PH UR: 5.5 — SIGNIFICANT CHANGE UP (ref 5–8)
PH UR: 6 — SIGNIFICANT CHANGE UP (ref 5–8)
PH UR: 7.5 — SIGNIFICANT CHANGE UP (ref 5–8)
PHOSPHATE SERPL-MCNC: 1.8 MG/DL — LOW (ref 2.5–4.5)
PHOSPHATE SERPL-MCNC: 1.8 MG/DL — LOW (ref 2.5–4.5)
PHOSPHATE SERPL-MCNC: 2.4 MG/DL — LOW (ref 2.5–4.5)
PHOSPHATE SERPL-MCNC: 2.4 MG/DL — LOW (ref 2.5–4.5)
PHOSPHATE SERPL-MCNC: 2.7 MG/DL — SIGNIFICANT CHANGE UP (ref 2.5–4.5)
PHOSPHATE SERPL-MCNC: 3.9 MG/DL — SIGNIFICANT CHANGE UP (ref 2.5–4.5)
PHOSPHATE SERPL-MCNC: 3.9 MG/DL — SIGNIFICANT CHANGE UP (ref 2.5–4.5)
PLAT MORPH BLD: ABNORMAL
PLAT MORPH BLD: ABNORMAL
PLAT MORPH BLD: NORMAL — SIGNIFICANT CHANGE UP
PLATELET # BLD AUTO: 199 K/UL — SIGNIFICANT CHANGE UP (ref 150–400)
PLATELET # BLD AUTO: 206 K/UL — SIGNIFICANT CHANGE UP (ref 150–400)
PLATELET # BLD AUTO: 231 K/UL — SIGNIFICANT CHANGE UP (ref 150–400)
PLATELET # BLD AUTO: 248 K/UL — SIGNIFICANT CHANGE UP (ref 150–400)
PLATELET # BLD AUTO: 283 K/UL — SIGNIFICANT CHANGE UP (ref 150–400)
PLATELET # BLD AUTO: 304 K/UL — SIGNIFICANT CHANGE UP (ref 150–400)
PLATELET # BLD AUTO: 368 K/UL — SIGNIFICANT CHANGE UP (ref 150–400)
PLATELET # BLD AUTO: 412 K/UL — HIGH (ref 150–400)
PO2 BLDA: 119 MMHG — HIGH (ref 83–108)
PO2 BLDA: 124 MMHG — HIGH (ref 83–108)
PO2 BLDA: 125 MMHG — HIGH (ref 83–108)
PO2 BLDA: 161 MMHG — HIGH (ref 83–108)
PO2 BLDA: 220 MMHG — HIGH (ref 83–108)
PO2 BLDA: 256 MMHG — HIGH (ref 83–108)
PO2 BLDA: 51 MMHG — LOW (ref 83–108)
PO2 BLDV: 56 MMHG — HIGH (ref 25–45)
POIKILOCYTOSIS BLD QL AUTO: SLIGHT — SIGNIFICANT CHANGE UP
POLYCHROMASIA BLD QL SMEAR: SLIGHT — SIGNIFICANT CHANGE UP
POTASSIUM SERPL-MCNC: 2.9 MMOL/L — CRITICAL LOW (ref 3.5–5.3)
POTASSIUM SERPL-MCNC: 3.2 MMOL/L — LOW (ref 3.5–5.3)
POTASSIUM SERPL-MCNC: 3.6 MMOL/L — SIGNIFICANT CHANGE UP (ref 3.5–5.3)
POTASSIUM SERPL-MCNC: 3.6 MMOL/L — SIGNIFICANT CHANGE UP (ref 3.5–5.3)
POTASSIUM SERPL-MCNC: 3.7 MMOL/L — SIGNIFICANT CHANGE UP (ref 3.5–5.3)
POTASSIUM SERPL-MCNC: 3.9 MMOL/L — SIGNIFICANT CHANGE UP (ref 3.5–5.3)
POTASSIUM SERPL-MCNC: 4 MMOL/L — SIGNIFICANT CHANGE UP (ref 3.5–5.3)
POTASSIUM SERPL-MCNC: 4.1 MMOL/L — SIGNIFICANT CHANGE UP (ref 3.5–5.3)
POTASSIUM SERPL-MCNC: 4.1 MMOL/L — SIGNIFICANT CHANGE UP (ref 3.5–5.3)
POTASSIUM SERPL-MCNC: 4.2 MMOL/L — SIGNIFICANT CHANGE UP (ref 3.5–5.3)
POTASSIUM SERPL-MCNC: 4.3 MMOL/L — SIGNIFICANT CHANGE UP (ref 3.5–5.3)
POTASSIUM SERPL-MCNC: 4.8 MMOL/L — SIGNIFICANT CHANGE UP (ref 3.5–5.3)
POTASSIUM SERPL-SCNC: 2.9 MMOL/L — CRITICAL LOW (ref 3.5–5.3)
POTASSIUM SERPL-SCNC: 3.2 MMOL/L — LOW (ref 3.5–5.3)
POTASSIUM SERPL-SCNC: 3.6 MMOL/L — SIGNIFICANT CHANGE UP (ref 3.5–5.3)
POTASSIUM SERPL-SCNC: 3.6 MMOL/L — SIGNIFICANT CHANGE UP (ref 3.5–5.3)
POTASSIUM SERPL-SCNC: 3.7 MMOL/L — SIGNIFICANT CHANGE UP (ref 3.5–5.3)
POTASSIUM SERPL-SCNC: 3.9 MMOL/L — SIGNIFICANT CHANGE UP (ref 3.5–5.3)
POTASSIUM SERPL-SCNC: 4 MMOL/L — SIGNIFICANT CHANGE UP (ref 3.5–5.3)
POTASSIUM SERPL-SCNC: 4.1 MMOL/L — SIGNIFICANT CHANGE UP (ref 3.5–5.3)
POTASSIUM SERPL-SCNC: 4.1 MMOL/L — SIGNIFICANT CHANGE UP (ref 3.5–5.3)
POTASSIUM SERPL-SCNC: 4.2 MMOL/L — SIGNIFICANT CHANGE UP (ref 3.5–5.3)
POTASSIUM SERPL-SCNC: 4.3 MMOL/L — SIGNIFICANT CHANGE UP (ref 3.5–5.3)
POTASSIUM SERPL-SCNC: 4.8 MMOL/L — SIGNIFICANT CHANGE UP (ref 3.5–5.3)
POTASSIUM UR-SCNC: 51 MMOL/L — SIGNIFICANT CHANGE UP
PROT ?TM UR-MCNC: 33 MG/DL — HIGH (ref 0–12)
PROT SERPL-MCNC: 4.8 G/DL — LOW (ref 6–8.3)
PROT SERPL-MCNC: 5.3 G/DL — LOW (ref 6–8.3)
PROT SERPL-MCNC: 5.3 G/DL — LOW (ref 6–8.3)
PROT SERPL-MCNC: 5.6 G/DL — LOW (ref 6–8.3)
PROT SERPL-MCNC: 5.6 G/DL — LOW (ref 6–8.3)
PROT SERPL-MCNC: 6 G/DL — SIGNIFICANT CHANGE UP (ref 6–8.3)
PROT SERPL-MCNC: 6.1 G/DL — SIGNIFICANT CHANGE UP (ref 6–8.3)
PROT SERPL-MCNC: 7.5 G/DL — SIGNIFICANT CHANGE UP (ref 6–8.3)
PROT UR-MCNC: 100 MG/DL
PROT UR-MCNC: 30 MG/DL
PROT UR-MCNC: >=1000 MG/DL
PROT/CREAT UR-RTO: 0.6 RATIO — HIGH (ref 0–0.2)
PROTHROM AB SERPL-ACNC: 11.5 SEC — SIGNIFICANT CHANGE UP (ref 9.5–13)
PROTHROM AB SERPL-ACNC: 11.5 SEC — SIGNIFICANT CHANGE UP (ref 9.5–13)
PROTHROM AB SERPL-ACNC: 9.8 SEC — SIGNIFICANT CHANGE UP (ref 9.5–13)
RAPID RVP RESULT: SIGNIFICANT CHANGE UP
RBC # BLD: 3.61 M/UL — LOW (ref 3.8–5.2)
RBC # BLD: 3.77 M/UL — LOW (ref 3.8–5.2)
RBC # BLD: 4.01 M/UL — SIGNIFICANT CHANGE UP (ref 3.8–5.2)
RBC # BLD: 4.55 M/UL — SIGNIFICANT CHANGE UP (ref 3.8–5.2)
RBC # BLD: 4.96 M/UL — SIGNIFICANT CHANGE UP (ref 3.8–5.2)
RBC # BLD: 5.24 M/UL — HIGH (ref 3.8–5.2)
RBC # BLD: 5.62 M/UL — HIGH (ref 3.8–5.2)
RBC # BLD: 5.95 M/UL — HIGH (ref 3.8–5.2)
RBC # FLD: 14.1 % — SIGNIFICANT CHANGE UP (ref 10.3–14.5)
RBC # FLD: 14.1 % — SIGNIFICANT CHANGE UP (ref 10.3–14.5)
RBC # FLD: 14.3 % — SIGNIFICANT CHANGE UP (ref 10.3–14.5)
RBC # FLD: 15.1 % — HIGH (ref 10.3–14.5)
RBC # FLD: 15.1 % — HIGH (ref 10.3–14.5)
RBC # FLD: 15.3 % — HIGH (ref 10.3–14.5)
RBC # FLD: 15.3 % — HIGH (ref 10.3–14.5)
RBC # FLD: 15.5 % — HIGH (ref 10.3–14.5)
RBC BLD AUTO: ABNORMAL
RBC CASTS # UR COMP ASSIST: 21 /HPF — HIGH (ref 0–4)
RBC CASTS # UR COMP ASSIST: 30 /HPF — HIGH (ref 0–4)
RBC CASTS # UR COMP ASSIST: 4 /HPF — SIGNIFICANT CHANGE UP (ref 0–4)
RH IG SCN BLD-IMP: POSITIVE — SIGNIFICANT CHANGE UP
RH IG SCN BLD-IMP: POSITIVE — SIGNIFICANT CHANGE UP
S AUREUS DNA NOSE QL NAA+PROBE: POSITIVE
SALICYLATES SERPL-MCNC: <0.3 MG/DL — LOW (ref 2.8–20)
SAO2 % BLDA: 79 % — LOW (ref 94–98)
SAO2 % BLDA: 97.5 % — SIGNIFICANT CHANGE UP (ref 94–98)
SAO2 % BLDA: 97.7 % — SIGNIFICANT CHANGE UP (ref 94–98)
SAO2 % BLDA: 97.8 % — SIGNIFICANT CHANGE UP (ref 94–98)
SAO2 % BLDA: 97.9 % — SIGNIFICANT CHANGE UP (ref 94–98)
SAO2 % BLDA: 98.2 % — HIGH (ref 94–98)
SAO2 % BLDA: 99.1 % — HIGH (ref 94–98)
SAO2 % BLDV: 73.1 % — SIGNIFICANT CHANGE UP (ref 67–88)
SARS-COV-2 RNA SPEC QL NAA+PROBE: SIGNIFICANT CHANGE UP
SMUDGE CELLS # BLD: PRESENT — SIGNIFICANT CHANGE UP
SODIUM SERPL-SCNC: 132 MMOL/L — LOW (ref 135–145)
SODIUM SERPL-SCNC: 138 MMOL/L — SIGNIFICANT CHANGE UP (ref 135–145)
SODIUM SERPL-SCNC: 138 MMOL/L — SIGNIFICANT CHANGE UP (ref 135–145)
SODIUM SERPL-SCNC: 142 MMOL/L — SIGNIFICANT CHANGE UP (ref 135–145)
SODIUM SERPL-SCNC: 145 MMOL/L — SIGNIFICANT CHANGE UP (ref 135–145)
SODIUM SERPL-SCNC: 147 MMOL/L — HIGH (ref 135–145)
SODIUM SERPL-SCNC: 148 MMOL/L — HIGH (ref 135–145)
SODIUM SERPL-SCNC: 150 MMOL/L — HIGH (ref 135–145)
SODIUM SERPL-SCNC: 154 MMOL/L — HIGH (ref 135–145)
SODIUM SERPL-SCNC: 157 MMOL/L — HIGH (ref 135–145)
SODIUM SERPL-SCNC: 159 MMOL/L — HIGH (ref 135–145)
SODIUM SERPL-SCNC: 160 MMOL/L — CRITICAL HIGH (ref 135–145)
SODIUM SERPL-SCNC: 161 MMOL/L — CRITICAL HIGH (ref 135–145)
SODIUM SERPL-SCNC: 163 MMOL/L — CRITICAL HIGH (ref 135–145)
SODIUM UR-SCNC: 47 MMOL/L — SIGNIFICANT CHANGE UP
SODIUM UR-SCNC: <20 MMOL/L — SIGNIFICANT CHANGE UP
SP GR SPEC: 1.01 — SIGNIFICANT CHANGE UP (ref 1–1.03)
SP GR SPEC: 1.01 — SIGNIFICANT CHANGE UP (ref 1–1.03)
SP GR SPEC: >1.03 — HIGH (ref 1–1.03)
SPECIMEN SOURCE: SIGNIFICANT CHANGE UP
SPHEROCYTES BLD QL SMEAR: SLIGHT — SIGNIFICANT CHANGE UP
SQUAMOUS # UR AUTO: 1 /HPF — SIGNIFICANT CHANGE UP (ref 0–5)
SQUAMOUS # UR AUTO: 16 /HPF — HIGH (ref 0–5)
SQUAMOUS # UR AUTO: 2 /HPF — SIGNIFICANT CHANGE UP (ref 0–5)
T4 FREE SERPL-MCNC: 0.89 NG/DL — LOW (ref 0.93–1.7)
THC UR QL: NEGATIVE — SIGNIFICANT CHANGE UP
TROPONIN T, HIGH SENSITIVITY RESULT: 6 NG/L — SIGNIFICANT CHANGE UP (ref 0–51)
TROPONIN T, HIGH SENSITIVITY RESULT: 7 NG/L — SIGNIFICANT CHANGE UP (ref 0–51)
TROPONIN T, HIGH SENSITIVITY RESULT: <6 NG/L — SIGNIFICANT CHANGE UP (ref 0–51)
UROBILINOGEN FLD QL: 0.2 MG/DL — SIGNIFICANT CHANGE UP (ref 0.2–1)
UROBILINOGEN FLD QL: 0.2 MG/DL — SIGNIFICANT CHANGE UP (ref 0.2–1)
UROBILINOGEN FLD QL: 1 MG/DL — SIGNIFICANT CHANGE UP (ref 0.2–1)
UUN UR-MCNC: 313 MG/DL — SIGNIFICANT CHANGE UP
VANCOMYCIN TROUGH SERPL-MCNC: 11.8 UG/ML — SIGNIFICANT CHANGE UP (ref 10–20)
WBC # BLD: 12.44 K/UL — HIGH (ref 3.8–10.5)
WBC # BLD: 13.06 K/UL — HIGH (ref 3.8–10.5)
WBC # BLD: 13.43 K/UL — HIGH (ref 3.8–10.5)
WBC # BLD: 14.79 K/UL — HIGH (ref 3.8–10.5)
WBC # BLD: 17.03 K/UL — HIGH (ref 3.8–10.5)
WBC # BLD: 17.25 K/UL — HIGH (ref 3.8–10.5)
WBC # BLD: 17.33 K/UL — HIGH (ref 3.8–10.5)
WBC # BLD: 4.25 K/UL — SIGNIFICANT CHANGE UP (ref 3.8–10.5)
WBC # FLD AUTO: 12.44 K/UL — HIGH (ref 3.8–10.5)
WBC # FLD AUTO: 13.06 K/UL — HIGH (ref 3.8–10.5)
WBC # FLD AUTO: 13.43 K/UL — HIGH (ref 3.8–10.5)
WBC # FLD AUTO: 14.79 K/UL — HIGH (ref 3.8–10.5)
WBC # FLD AUTO: 17.03 K/UL — HIGH (ref 3.8–10.5)
WBC # FLD AUTO: 17.25 K/UL — HIGH (ref 3.8–10.5)
WBC # FLD AUTO: 17.33 K/UL — HIGH (ref 3.8–10.5)
WBC # FLD AUTO: 4.25 K/UL — SIGNIFICANT CHANGE UP (ref 3.8–10.5)
WBC UR QL: 14 /HPF — HIGH (ref 0–5)
WBC UR QL: 3 /HPF — SIGNIFICANT CHANGE UP (ref 0–5)
WBC UR QL: 4 /HPF — SIGNIFICANT CHANGE UP (ref 0–5)
YEAST-LIKE CELLS: PRESENT

## 2024-01-01 PROCEDURE — 71045 X-RAY EXAM CHEST 1 VIEW: CPT | Mod: 26

## 2024-01-01 PROCEDURE — 99223 1ST HOSP IP/OBS HIGH 75: CPT

## 2024-01-01 PROCEDURE — 99221 1ST HOSP IP/OBS SF/LOW 40: CPT

## 2024-01-01 PROCEDURE — 99233 SBSQ HOSP IP/OBS HIGH 50: CPT

## 2024-01-01 PROCEDURE — 99291 CRITICAL CARE FIRST HOUR: CPT | Mod: 25

## 2024-01-01 PROCEDURE — 99291 CRITICAL CARE FIRST HOUR: CPT | Mod: GC

## 2024-01-01 PROCEDURE — 36415 COLL VENOUS BLD VENIPUNCTURE: CPT

## 2024-01-01 PROCEDURE — 71275 CT ANGIOGRAPHY CHEST: CPT | Mod: 26

## 2024-01-01 PROCEDURE — 70450 CT HEAD/BRAIN W/O DYE: CPT | Mod: 26

## 2024-01-01 PROCEDURE — 76937 US GUIDE VASCULAR ACCESS: CPT | Mod: 26

## 2024-01-01 PROCEDURE — 99497 ADVNCD CARE PLAN 30 MIN: CPT | Mod: 25

## 2024-01-01 PROCEDURE — 95720 EEG PHY/QHP EA INCR W/VEEG: CPT

## 2024-01-01 PROCEDURE — ZZZZZ: CPT

## 2024-01-01 PROCEDURE — 92950 HEART/LUNG RESUSCITATION CPR: CPT

## 2024-01-01 PROCEDURE — 99291 CRITICAL CARE FIRST HOUR: CPT

## 2024-01-01 PROCEDURE — 99214 OFFICE O/P EST MOD 30 MIN: CPT

## 2024-01-01 PROCEDURE — 74177 CT ABD & PELVIS W/CONTRAST: CPT | Mod: 26

## 2024-01-01 RX ORDER — HYDROCHLOROTHIAZIDE 25 MG/1
25 TABLET ORAL DAILY
Qty: 30 | Refills: 0 | Status: ACTIVE | COMMUNITY
Start: 2023-01-01 | End: 1900-01-01

## 2024-01-01 RX ORDER — INSULIN LISPRO 100/ML
VIAL (ML) SUBCUTANEOUS
Refills: 0 | Status: DISCONTINUED | OUTPATIENT
Start: 2024-01-01 | End: 2024-05-26

## 2024-01-01 RX ORDER — CHLORHEXIDINE GLUCONATE 4 %
325 (65 FE) LIQUID (ML) TOPICAL DAILY
Qty: 30 | Refills: 0 | Status: ACTIVE | COMMUNITY
Start: 1900-01-01 | End: 1900-01-01

## 2024-01-01 RX ORDER — OMEPRAZOLE MAGNESIUM 40 MG/1
40 CAPSULE, DELAYED RELEASE ORAL
Refills: 0 | Status: DISCONTINUED | COMMUNITY
End: 2024-01-01

## 2024-01-01 RX ORDER — MANNITOL
100 POWDER (GRAM) MISCELLANEOUS ONCE
Refills: 0 | Status: DISCONTINUED | OUTPATIENT
Start: 2024-01-01 | End: 2024-01-01

## 2024-01-01 RX ORDER — TRAMADOL HYDROCHLORIDE 50 MG/1
50 TABLET, COATED ORAL
Refills: 0 | Status: DISCONTINUED | COMMUNITY
End: 2024-01-01

## 2024-01-01 RX ORDER — CHLORHEXIDINE GLUCONATE 213 G/1000ML
1 SOLUTION TOPICAL
Refills: 0 | Status: DISCONTINUED | OUTPATIENT
Start: 2024-01-01 | End: 2024-05-26

## 2024-01-01 RX ORDER — LABETALOL HCL 100 MG
5 TABLET ORAL ONCE
Refills: 0 | Status: COMPLETED | OUTPATIENT
Start: 2024-01-01 | End: 2024-01-01

## 2024-01-01 RX ORDER — SUMATRIPTAN 100 MG/1
100 TABLET, FILM COATED ORAL
Refills: 0 | Status: DISCONTINUED | COMMUNITY
End: 2024-01-01

## 2024-01-01 RX ORDER — FLUOXETINE HCL 10 MG
1 CAPSULE ORAL
Refills: 0 | DISCHARGE

## 2024-01-01 RX ORDER — QUETIAPINE FUMARATE 100 MG/1
100 TABLET ORAL
Qty: 30 | Refills: 2 | Status: DISCONTINUED | COMMUNITY
Start: 2024-01-01 | End: 2024-01-01

## 2024-01-01 RX ORDER — CETIRIZINE HCL 10 MG
10 TABLET ORAL
Refills: 0 | Status: DISCONTINUED | COMMUNITY
End: 2024-01-01

## 2024-01-01 RX ORDER — TRAZODONE HCL 50 MG
0 TABLET ORAL
Refills: 0 | DISCHARGE

## 2024-01-01 RX ORDER — PROPOFOL 10 MG/ML
20 INJECTION, EMULSION INTRAVENOUS
Qty: 1000 | Refills: 0 | Status: DISCONTINUED | OUTPATIENT
Start: 2024-01-01 | End: 2024-01-01

## 2024-01-01 RX ORDER — FERROUS SULFATE TAB 325 MG (65 MG ELEMENTAL FE) 325 (65 FE) MG
325 (65 FE) TAB ORAL DAILY
Qty: 90 | Refills: 0 | Status: DISCONTINUED | COMMUNITY
Start: 2023-01-01 | End: 2024-01-01

## 2024-01-01 RX ORDER — LEUCOVORIN CALCIUM 15 MG/1
15 TABLET ORAL
Refills: 0 | Status: DISCONTINUED | COMMUNITY
End: 2024-01-01

## 2024-01-01 RX ORDER — BLOOD-GLUCOSE METER
KIT MISCELLANEOUS
Qty: 1 | Refills: 0 | Status: DISCONTINUED | COMMUNITY
Start: 2023-01-01 | End: 2024-01-01

## 2024-01-01 RX ORDER — SODIUM CHLORIDE 9 MG/ML
1000 INJECTION INTRAMUSCULAR; INTRAVENOUS; SUBCUTANEOUS ONCE
Refills: 0 | Status: COMPLETED | OUTPATIENT
Start: 2024-01-01 | End: 2024-01-01

## 2024-01-01 RX ORDER — VANCOMYCIN HCL 1 G
1750 VIAL (EA) INTRAVENOUS EVERY 12 HOURS
Refills: 0 | Status: COMPLETED | OUTPATIENT
Start: 2024-01-01 | End: 2024-05-23

## 2024-01-01 RX ORDER — SODIUM BICARBONATE 1 MEQ/ML
50 SYRINGE (ML) INTRAVENOUS ONCE
Refills: 0 | Status: COMPLETED | OUTPATIENT
Start: 2024-01-01 | End: 2024-01-01

## 2024-01-01 RX ORDER — AMLODIPINE BESYLATE 10 MG/1
10 TABLET ORAL DAILY
Qty: 30 | Refills: 0 | Status: ACTIVE | COMMUNITY
Start: 2023-01-01 | End: 1900-01-01

## 2024-01-01 RX ORDER — PIPERACILLIN AND TAZOBACTAM 4; .5 G/20ML; G/20ML
3.38 INJECTION, POWDER, LYOPHILIZED, FOR SOLUTION INTRAVENOUS ONCE
Refills: 0 | Status: DISCONTINUED | OUTPATIENT
Start: 2024-01-01 | End: 2024-01-01

## 2024-01-01 RX ORDER — POTASSIUM PHOSPHATE, MONOBASIC POTASSIUM PHOSPHATE, DIBASIC 236; 224 MG/ML; MG/ML
15 INJECTION, SOLUTION INTRAVENOUS ONCE
Refills: 0 | Status: COMPLETED | OUTPATIENT
Start: 2024-01-01 | End: 2024-01-01

## 2024-01-01 RX ORDER — GLUCAGON INJECTION, SOLUTION 0.5 MG/.1ML
1 INJECTION, SOLUTION SUBCUTANEOUS ONCE
Refills: 0 | Status: DISCONTINUED | OUTPATIENT
Start: 2024-01-01 | End: 2024-05-26

## 2024-01-01 RX ORDER — PIPERACILLIN AND TAZOBACTAM 4; .5 G/20ML; G/20ML
3.38 INJECTION, POWDER, LYOPHILIZED, FOR SOLUTION INTRAVENOUS ONCE
Refills: 0 | Status: COMPLETED | OUTPATIENT
Start: 2024-01-01 | End: 2024-01-01

## 2024-01-01 RX ORDER — VANCOMYCIN HCL 1 G
1500 VIAL (EA) INTRAVENOUS EVERY 12 HOURS
Refills: 0 | Status: DISCONTINUED | OUTPATIENT
Start: 2024-01-01 | End: 2024-01-01

## 2024-01-01 RX ORDER — LOSARTAN POTASSIUM 100 MG/1
100 TABLET, FILM COATED ORAL DAILY
Qty: 30 | Refills: 0 | Status: ACTIVE | COMMUNITY
Start: 2023-01-01 | End: 1900-01-01

## 2024-01-01 RX ORDER — SODIUM CHLORIDE 9 MG/ML
1000 INJECTION INTRAMUSCULAR; INTRAVENOUS; SUBCUTANEOUS ONCE
Refills: 0 | Status: DISCONTINUED | OUTPATIENT
Start: 2024-01-01 | End: 2024-01-01

## 2024-01-01 RX ORDER — GABAPENTIN 400 MG/1
400 CAPSULE ORAL 3 TIMES DAILY
Qty: 90 | Refills: 0 | Status: DISCONTINUED | COMMUNITY
Start: 2024-01-01 | End: 2024-01-01

## 2024-01-01 RX ORDER — PROPOFOL 10 MG/ML
5 INJECTION, EMULSION INTRAVENOUS
Qty: 1000 | Refills: 0 | Status: DISCONTINUED | OUTPATIENT
Start: 2024-01-01 | End: 2024-01-01

## 2024-01-01 RX ORDER — POTASSIUM PHOSPHATE, MONOBASIC POTASSIUM PHOSPHATE, DIBASIC 236; 224 MG/ML; MG/ML
15 INJECTION, SOLUTION INTRAVENOUS ONCE
Refills: 0 | Status: DISCONTINUED | OUTPATIENT
Start: 2024-01-01 | End: 2024-01-01

## 2024-01-01 RX ORDER — ACETAMINOPHEN 500 MG
1000 TABLET ORAL ONCE
Refills: 0 | Status: COMPLETED | OUTPATIENT
Start: 2024-01-01 | End: 2024-01-01

## 2024-01-01 RX ORDER — VASOPRESSIN 20 [USP'U]/ML
1 INJECTION INTRAVENOUS ONCE
Refills: 0 | Status: COMPLETED | OUTPATIENT
Start: 2024-01-01 | End: 2024-01-01

## 2024-01-01 RX ORDER — ALBUTEROL 90 MCG
90 AEROSOL (GRAM) INHALATION
Refills: 0 | Status: DISCONTINUED | COMMUNITY
End: 2024-01-01

## 2024-01-01 RX ORDER — LEVOTHYROXINE SODIUM 125 MCG
10 TABLET ORAL
Qty: 200 | Refills: 0 | Status: DISCONTINUED | OUTPATIENT
Start: 2024-01-01 | End: 2024-05-26

## 2024-01-01 RX ORDER — PROPOFOL 10 MG/ML
20 INJECTION, EMULSION INTRAVENOUS
Qty: 500 | Refills: 0 | Status: DISCONTINUED | OUTPATIENT
Start: 2024-01-01 | End: 2024-01-01

## 2024-01-01 RX ORDER — MAGNESIUM SULFATE 500 MG/ML
2 VIAL (ML) INJECTION ONCE
Refills: 0 | Status: COMPLETED | OUTPATIENT
Start: 2024-01-01 | End: 2024-01-01

## 2024-01-01 RX ORDER — AMLODIPINE BESYLATE 2.5 MG/1
1 TABLET ORAL
Refills: 0 | DISCHARGE

## 2024-01-01 RX ORDER — GABAPENTIN 800 MG/1
800 TABLET, COATED ORAL
Qty: 90 | Refills: 0 | Status: DISCONTINUED | COMMUNITY
Start: 2024-01-01 | End: 2024-01-01

## 2024-01-01 RX ORDER — PIPERACILLIN AND TAZOBACTAM 4; .5 G/20ML; G/20ML
3.38 INJECTION, POWDER, LYOPHILIZED, FOR SOLUTION INTRAVENOUS EVERY 8 HOURS
Refills: 0 | Status: DISCONTINUED | OUTPATIENT
Start: 2024-01-01 | End: 2024-01-01

## 2024-01-01 RX ORDER — LOSARTAN POTASSIUM 100 MG/1
1 TABLET, FILM COATED ORAL
Refills: 0 | DISCHARGE

## 2024-01-01 RX ORDER — SODIUM CHLORIDE 9 MG/ML
1000 INJECTION, SOLUTION INTRAVENOUS
Refills: 0 | Status: DISCONTINUED | OUTPATIENT
Start: 2024-01-01 | End: 2024-01-01

## 2024-01-01 RX ORDER — SODIUM CHLORIDE 5 G/100ML
500 INJECTION, SOLUTION INTRAVENOUS
Refills: 0 | Status: DISCONTINUED | OUTPATIENT
Start: 2024-01-01 | End: 2024-01-01

## 2024-01-01 RX ORDER — AMLODIPINE BESYLATE 10 MG/1
10 TABLET ORAL
Refills: 0 | Status: DISCONTINUED | COMMUNITY
End: 2024-01-01

## 2024-01-01 RX ORDER — PREGABALIN 100 MG/1
100 CAPSULE ORAL
Refills: 0 | Status: DISCONTINUED | COMMUNITY
End: 2024-01-01

## 2024-01-01 RX ORDER — SODIUM CHLORIDE 9 MG/ML
30 INJECTION INTRAMUSCULAR; INTRAVENOUS; SUBCUTANEOUS ONCE
Refills: 0 | Status: COMPLETED | OUTPATIENT
Start: 2024-01-01 | End: 2024-01-01

## 2024-01-01 RX ORDER — ATORVASTATIN CALCIUM 40 MG/1
40 TABLET, FILM COATED ORAL DAILY
Qty: 30 | Refills: 0 | Status: ACTIVE | COMMUNITY
Start: 2023-01-01 | End: 1900-01-01

## 2024-01-01 RX ORDER — CHLORHEXIDINE GLUCONATE 213 G/1000ML
15 SOLUTION TOPICAL EVERY 12 HOURS
Refills: 0 | Status: DISCONTINUED | OUTPATIENT
Start: 2024-01-01 | End: 2024-01-01

## 2024-01-01 RX ORDER — GABAPENTIN 400 MG/1
1 CAPSULE ORAL
Refills: 0 | DISCHARGE

## 2024-01-01 RX ORDER — FAMOTIDINE 10 MG/ML
1 INJECTION INTRAVENOUS
Refills: 0 | DISCHARGE

## 2024-01-01 RX ORDER — ACETYLCYSTEINE 200 MG/ML
4 VIAL (ML) MISCELLANEOUS EVERY 4 HOURS
Refills: 0 | Status: DISCONTINUED | OUTPATIENT
Start: 2024-01-01 | End: 2024-05-26

## 2024-01-01 RX ORDER — FAMOTIDINE 20 MG/1
20 TABLET, FILM COATED ORAL
Refills: 0 | Status: DISCONTINUED | COMMUNITY
End: 2024-01-01

## 2024-01-01 RX ORDER — CEFTRIAXONE 500 MG/1
2000 INJECTION, POWDER, FOR SOLUTION INTRAMUSCULAR; INTRAVENOUS EVERY 24 HOURS
Refills: 0 | Status: DISCONTINUED | OUTPATIENT
Start: 2024-01-01 | End: 2024-01-01

## 2024-01-01 RX ORDER — ATORVASTATIN CALCIUM 40 MG/1
40 TABLET, FILM COATED ORAL
Refills: 0 | Status: DISCONTINUED | COMMUNITY
End: 2024-01-01

## 2024-01-01 RX ORDER — SODIUM BICARBONATE 1 MEQ/ML
150 SYRINGE (ML) INTRAVENOUS ONCE
Refills: 0 | Status: COMPLETED | OUTPATIENT
Start: 2024-01-01 | End: 2024-01-01

## 2024-01-01 RX ORDER — POTASSIUM CHLORIDE 20 MEQ
40 PACKET (EA) ORAL ONCE
Refills: 0 | Status: COMPLETED | OUTPATIENT
Start: 2024-01-01 | End: 2024-01-01

## 2024-01-01 RX ORDER — DESMOPRESSIN ACETATE 0.1 MG/1
2 TABLET ORAL ONCE
Refills: 0 | Status: DISCONTINUED | OUTPATIENT
Start: 2024-01-01 | End: 2024-01-01

## 2024-01-01 RX ORDER — EPINEPHRINE 0.3 MG/.3ML
1 INJECTION INTRAMUSCULAR; SUBCUTANEOUS ONCE
Refills: 0 | Status: COMPLETED | OUTPATIENT
Start: 2024-01-01 | End: 2024-01-01

## 2024-01-01 RX ORDER — SODIUM CHLORIDE 9 MG/ML
1000 INJECTION, SOLUTION INTRAVENOUS
Refills: 0 | Status: DISCONTINUED | OUTPATIENT
Start: 2024-01-01 | End: 2024-05-26

## 2024-01-01 RX ORDER — FLUOXETINE HYDROCHLORIDE 10 MG/1
10 CAPSULE ORAL
Qty: 30 | Refills: 0 | Status: DISCONTINUED | COMMUNITY
Start: 2024-01-01 | End: 2024-01-01

## 2024-01-01 RX ORDER — LITHIUM CARBONATE 300 MG/1
3 TABLET, EXTENDED RELEASE ORAL
Refills: 0 | DISCHARGE

## 2024-01-01 RX ORDER — DEXTROSE 50 % IN WATER 50 %
25 SYRINGE (ML) INTRAVENOUS ONCE
Refills: 0 | Status: DISCONTINUED | OUTPATIENT
Start: 2024-01-01 | End: 2024-05-26

## 2024-01-01 RX ORDER — NOREPINEPHRINE BITARTRATE/D5W 8 MG/250ML
0.05 PLASTIC BAG, INJECTION (ML) INTRAVENOUS
Qty: 8 | Refills: 0 | Status: DISCONTINUED | OUTPATIENT
Start: 2024-01-01 | End: 2024-01-01

## 2024-01-01 RX ORDER — POLYETHYLENE GLYCOL 3350 17 G/17G
17 POWDER, FOR SOLUTION ORAL DAILY
Qty: 30 | Refills: 2 | Status: ACTIVE | COMMUNITY
Start: 2024-01-01 | End: 1900-01-01

## 2024-01-01 RX ORDER — LOSARTAN POTASSIUM 100 MG/1
100 TABLET, FILM COATED ORAL
Refills: 0 | Status: DISCONTINUED | COMMUNITY
End: 2024-01-01

## 2024-01-01 RX ORDER — DICYCLOMINE HYDROCHLORIDE 10 MG/1
10 CAPSULE ORAL
Refills: 0 | Status: DISCONTINUED | COMMUNITY
End: 2024-01-01

## 2024-01-01 RX ORDER — DESMOPRESSIN ACETATE 0.1 MG/1
2 TABLET ORAL ONCE
Refills: 0 | Status: COMPLETED | OUTPATIENT
Start: 2024-01-01 | End: 2024-01-01

## 2024-01-01 RX ORDER — PANTOPRAZOLE SODIUM 20 MG/1
40 TABLET, DELAYED RELEASE ORAL DAILY
Refills: 0 | Status: DISCONTINUED | OUTPATIENT
Start: 2024-01-01 | End: 2024-05-26

## 2024-01-01 RX ORDER — SODIUM CHLORIDE 9 MG/ML
1000 INJECTION, SOLUTION INTRAVENOUS
Refills: 0 | Status: DISCONTINUED | OUTPATIENT
Start: 2024-01-01 | End: 2024-05-24

## 2024-01-01 RX ORDER — LEVOTHYROXINE SODIUM 125 MCG
20 TABLET ORAL ONCE
Refills: 0 | Status: COMPLETED | OUTPATIENT
Start: 2024-01-01 | End: 2024-01-01

## 2024-01-01 RX ORDER — GUAIFENESIN 100 MG/5ML
100 LIQUID ORAL EVERY 4 HOURS
Qty: 600 | Refills: 0 | Status: ACTIVE | COMMUNITY
Start: 2024-01-01 | End: 1900-01-01

## 2024-01-01 RX ORDER — ATORVASTATIN CALCIUM 80 MG/1
1 TABLET, FILM COATED ORAL
Refills: 0 | DISCHARGE

## 2024-01-01 RX ORDER — VANCOMYCIN HCL 1 G
1500 VIAL (EA) INTRAVENOUS EVERY 12 HOURS
Refills: 0 | Status: COMPLETED | OUTPATIENT
Start: 2024-01-01 | End: 2024-01-01

## 2024-01-01 RX ORDER — METHOTREXATE 2.5 MG/1
2.5 TABLET ORAL
Qty: 13 | Refills: 0 | Status: ACTIVE | COMMUNITY
Start: 1900-01-01 | End: 1900-01-01

## 2024-01-01 RX ORDER — METHOTREXATE 2.5 MG/1
0 TABLET ORAL
Refills: 0 | DISCHARGE

## 2024-01-01 RX ORDER — DEXTROSE 10 % IN WATER 10 %
125 INTRAVENOUS SOLUTION INTRAVENOUS ONCE
Refills: 0 | Status: DISCONTINUED | OUTPATIENT
Start: 2024-01-01 | End: 2024-05-26

## 2024-01-01 RX ORDER — LEUCOVORIN CALCIUM 15 MG/1
15 TABLET ORAL
Qty: 5 | Refills: 0 | Status: DISCONTINUED | COMMUNITY
Start: 2023-01-01 | End: 2024-01-01

## 2024-01-01 RX ORDER — POLYETHYLENE GLYCOL 3350 17 G/17G
17 POWDER, FOR SOLUTION ORAL
Refills: 0 | Status: DISCONTINUED | COMMUNITY
End: 2024-01-01

## 2024-01-01 RX ORDER — POTASSIUM CHLORIDE 20 MEQ
20 PACKET (EA) ORAL
Refills: 0 | Status: COMPLETED | OUTPATIENT
Start: 2024-01-01 | End: 2024-01-01

## 2024-01-01 RX ORDER — ALBUTEROL SULFATE 90 UG/1
108 (90 BASE) INHALANT RESPIRATORY (INHALATION)
Qty: 1 | Refills: 2 | Status: ACTIVE | COMMUNITY
Start: 2024-01-01 | End: 1900-01-01

## 2024-01-01 RX ORDER — SODIUM CHLORIDE 9 MG/ML
1000 INJECTION, SOLUTION INTRAVENOUS ONCE
Refills: 0 | Status: COMPLETED | OUTPATIENT
Start: 2024-01-01 | End: 2024-01-01

## 2024-01-01 RX ORDER — PIPERACILLIN AND TAZOBACTAM 4; .5 G/20ML; G/20ML
3.38 INJECTION, POWDER, LYOPHILIZED, FOR SOLUTION INTRAVENOUS EVERY 8 HOURS
Refills: 0 | Status: DISCONTINUED | OUTPATIENT
Start: 2024-01-01 | End: 2024-05-26

## 2024-01-01 RX ORDER — ENOXAPARIN SODIUM 100 MG/ML
40 INJECTION SUBCUTANEOUS EVERY 24 HOURS
Refills: 0 | Status: DISCONTINUED | OUTPATIENT
Start: 2024-01-01 | End: 2024-05-26

## 2024-01-01 RX ORDER — QUETIAPINE FUMARATE 25 MG/1
25 TABLET ORAL
Qty: 30 | Refills: 0 | Status: DISCONTINUED | COMMUNITY
Start: 2024-01-01 | End: 2024-01-01

## 2024-01-01 RX ORDER — GABAPENTIN 800 MG/1
800 TABLET, COATED ORAL 3 TIMES DAILY
Qty: 90 | Refills: 0 | Status: ACTIVE | COMMUNITY
Start: 2024-01-01 | End: 1900-01-01

## 2024-01-01 RX ORDER — CARVEDILOL PHOSPHATE 80 MG/1
1 CAPSULE, EXTENDED RELEASE ORAL
Refills: 0 | DISCHARGE

## 2024-01-01 RX ORDER — CARVEDILOL 6.25 MG/1
6.25 TABLET, FILM COATED ORAL TWICE DAILY
Qty: 60 | Refills: 0 | Status: ACTIVE | COMMUNITY
Start: 2023-01-01 | End: 1900-01-01

## 2024-01-01 RX ORDER — FAMOTIDINE 20 MG/1
20 TABLET, FILM COATED ORAL
Qty: 90 | Refills: 0 | Status: ACTIVE | COMMUNITY
Start: 2023-01-01

## 2024-01-01 RX ORDER — LACTULOSE 10 G/15ML
20 SOLUTION ORAL EVERY 6 HOURS
Refills: 0 | Status: DISCONTINUED | OUTPATIENT
Start: 2024-01-01 | End: 2024-01-01

## 2024-01-01 RX ORDER — UBIDECARENONE/VIT E ACET 100MG-5
1000 CAPSULE ORAL
Refills: 0 | Status: DISCONTINUED | COMMUNITY
End: 2024-01-01

## 2024-01-01 RX ORDER — CALCIUM GLUCONATE 100 MG/ML
1 VIAL (ML) INTRAVENOUS ONCE
Refills: 0 | Status: COMPLETED | OUTPATIENT
Start: 2024-01-01 | End: 2024-01-01

## 2024-01-01 RX ORDER — SODIUM CHLORIDE 9 MG/ML
1000 INJECTION INTRAMUSCULAR; INTRAVENOUS; SUBCUTANEOUS
Refills: 0 | Status: DISCONTINUED | OUTPATIENT
Start: 2024-01-01 | End: 2024-01-01

## 2024-01-01 RX ORDER — ADALIMUMAB 40MG/0.4ML
40 KIT SUBCUTANEOUS
Refills: 0 | Status: ACTIVE | COMMUNITY

## 2024-01-01 RX ORDER — LEUCOVORIN CALCIUM 5 MG/1
5 TABLET ORAL
Qty: 5 | Refills: 0 | Status: ACTIVE | COMMUNITY
Start: 2024-01-01 | End: 1900-01-01

## 2024-01-01 RX ORDER — LITHIUM CARBONATE 150 MG/1
150 CAPSULE ORAL
Qty: 30 | Refills: 0 | Status: DISCONTINUED | COMMUNITY
Start: 2024-01-01 | End: 2024-01-01

## 2024-01-01 RX ORDER — LITHIUM CARBONATE 150 MG/1
150 CAPSULE ORAL TWICE DAILY
Qty: 180 | Refills: 2 | Status: ACTIVE | COMMUNITY
Start: 2024-01-01 | End: 1900-01-01

## 2024-01-01 RX ORDER — POTASSIUM CHLORIDE 20 MEQ
10 PACKET (EA) ORAL
Refills: 0 | Status: COMPLETED | OUTPATIENT
Start: 2024-01-01 | End: 2024-01-01

## 2024-01-01 RX ORDER — NAPROXEN 500 MG/1
500 TABLET ORAL
Refills: 0 | Status: DISCONTINUED | COMMUNITY
End: 2024-01-01

## 2024-01-01 RX ORDER — CARVEDILOL 6.25 MG/1
6.25 TABLET, FILM COATED ORAL
Refills: 0 | Status: DISCONTINUED | COMMUNITY
End: 2024-01-01

## 2024-01-01 RX ORDER — VANCOMYCIN HCL 1 G
1250 VIAL (EA) INTRAVENOUS ONCE
Refills: 0 | Status: COMPLETED | OUTPATIENT
Start: 2024-01-01 | End: 2024-01-01

## 2024-01-01 RX ORDER — TRAZODONE HYDROCHLORIDE 100 MG/1
100 TABLET ORAL
Qty: 30 | Refills: 2 | Status: ACTIVE | COMMUNITY
Start: 2024-01-01 | End: 1900-01-01

## 2024-01-01 RX ORDER — IPRATROPIUM/ALBUTEROL SULFATE 18-103MCG
3 AEROSOL WITH ADAPTER (GRAM) INHALATION EVERY 6 HOURS
Refills: 0 | Status: DISCONTINUED | OUTPATIENT
Start: 2024-01-01 | End: 2024-05-26

## 2024-01-01 RX ORDER — ALBUTEROL SULFATE 2.5 MG/.5ML
2.5 SOLUTION RESPIRATORY (INHALATION)
Qty: 1 | Refills: 0 | Status: DISCONTINUED | COMMUNITY
Start: 2023-01-01 | End: 2024-01-01

## 2024-01-01 RX ORDER — VASOPRESSIN 20 [USP'U]/ML
0.5 INJECTION INTRAVENOUS
Qty: 40 | Refills: 0 | Status: DISCONTINUED | OUTPATIENT
Start: 2024-01-01 | End: 2024-05-26

## 2024-01-01 RX ORDER — CHLORHEXIDINE GLUCONATE 213 G/1000ML
15 SOLUTION TOPICAL EVERY 12 HOURS
Refills: 0 | Status: DISCONTINUED | OUTPATIENT
Start: 2024-01-01 | End: 2024-05-23

## 2024-01-01 RX ORDER — POTASSIUM PHOSPHATE, MONOBASIC POTASSIUM PHOSPHATE, DIBASIC 236; 224 MG/ML; MG/ML
30 INJECTION, SOLUTION INTRAVENOUS ONCE
Refills: 0 | Status: COMPLETED | OUTPATIENT
Start: 2024-01-01 | End: 2024-01-01

## 2024-01-01 RX ORDER — BUPROPION HYDROCHLORIDE 150 MG/1
150 TABLET, FILM COATED, EXTENDED RELEASE ORAL DAILY
Qty: 30 | Refills: 0 | Status: DISCONTINUED | COMMUNITY
End: 2024-01-01

## 2024-01-01 RX ORDER — FENTANYL CITRATE 50 UG/ML
50 INJECTION INTRAVENOUS
Refills: 0 | Status: DISCONTINUED | OUTPATIENT
Start: 2024-01-01 | End: 2024-05-26

## 2024-01-01 RX ORDER — FERROUS SULFATE 325(65) MG
1 TABLET ORAL
Refills: 0 | DISCHARGE

## 2024-01-01 RX ORDER — PREDNISONE 20 MG/1
20 TABLET ORAL
Qty: 10 | Refills: 0 | Status: DISCONTINUED | COMMUNITY
Start: 2023-01-01 | End: 2024-01-01

## 2024-01-01 RX ORDER — ACETAMINOPHEN 500 MG/1
500 TABLET ORAL
Qty: 28 | Refills: 0 | Status: ACTIVE | COMMUNITY
Start: 2024-01-01 | End: 1900-01-01

## 2024-01-01 RX ORDER — FLUTICASONE PROPIONATE 220 UG/1
AEROSOL, METERED RESPIRATORY (INHALATION)
Refills: 0 | Status: DISCONTINUED | COMMUNITY
End: 2024-01-01

## 2024-01-01 RX ORDER — DEXTROSE 50 % IN WATER 50 %
15 SYRINGE (ML) INTRAVENOUS ONCE
Refills: 0 | Status: DISCONTINUED | OUTPATIENT
Start: 2024-01-01 | End: 2024-05-26

## 2024-01-01 RX ORDER — HYDROCHLOROTHIAZIDE 25 MG
1 TABLET ORAL
Refills: 0 | DISCHARGE

## 2024-01-01 RX ORDER — HYDROCHLOROTHIAZIDE 12.5 MG/1
12.5 CAPSULE ORAL
Refills: 0 | Status: DISCONTINUED | COMMUNITY
End: 2024-01-01

## 2024-01-01 RX ORDER — SODIUM CHLORIDE 9 MG/ML
10 INJECTION INTRAMUSCULAR; INTRAVENOUS; SUBCUTANEOUS
Refills: 0 | Status: DISCONTINUED | OUTPATIENT
Start: 2024-01-01 | End: 2024-05-26

## 2024-01-01 RX ORDER — CYCLOBENZAPRINE HCL 5 MG
TABLET ORAL
Refills: 0 | Status: DISCONTINUED | COMMUNITY
End: 2024-01-01

## 2024-01-01 RX ORDER — MANNITOL
100 POWDER (GRAM) MISCELLANEOUS ONCE
Refills: 0 | Status: COMPLETED | OUTPATIENT
Start: 2024-01-01 | End: 2024-01-01

## 2024-01-01 RX ORDER — MAGNESIUM SULFATE 500 MG/ML
1 VIAL (ML) INJECTION ONCE
Refills: 0 | Status: COMPLETED | OUTPATIENT
Start: 2024-01-01 | End: 2024-01-01

## 2024-01-01 RX ORDER — OMEPRAZOLE 40 MG/1
40 CAPSULE, DELAYED RELEASE ORAL
Qty: 90 | Refills: 0 | Status: ACTIVE | COMMUNITY
Start: 2023-01-01 | End: 1900-01-01

## 2024-01-01 RX ORDER — FLUOXETINE HYDROCHLORIDE 20 MG/1
20 CAPSULE ORAL
Qty: 30 | Refills: 2 | Status: ACTIVE | COMMUNITY
Start: 2024-01-01 | End: 1900-01-01

## 2024-01-01 RX ORDER — ELECTROLYTES/DEXTROSE
10 SOLUTION, ORAL ORAL
Refills: 0 | Status: DISCONTINUED | COMMUNITY
End: 2024-01-01

## 2024-01-01 RX ORDER — SODIUM BICARBONATE 1 MEQ/ML
0.28 SYRINGE (ML) INTRAVENOUS
Qty: 150 | Refills: 0 | Status: DISCONTINUED | OUTPATIENT
Start: 2024-01-01 | End: 2024-01-01

## 2024-01-01 RX ORDER — DEXTROSE 50 % IN WATER 50 %
12.5 SYRINGE (ML) INTRAVENOUS ONCE
Refills: 0 | Status: DISCONTINUED | OUTPATIENT
Start: 2024-01-01 | End: 2024-05-26

## 2024-01-01 RX ORDER — SODIUM CHLORIDE 9 MG/ML
500 INJECTION, SOLUTION INTRAVENOUS
Refills: 0 | Status: DISCONTINUED | OUTPATIENT
Start: 2024-01-01 | End: 2024-01-01

## 2024-01-01 RX ADMIN — PIPERACILLIN AND TAZOBACTAM 25 GRAM(S): 4; .5 INJECTION, POWDER, LYOPHILIZED, FOR SOLUTION INTRAVENOUS at 05:02

## 2024-01-01 RX ADMIN — Medication 50 MILLIGRAM(S): at 23:53

## 2024-01-01 RX ADMIN — Medication 400 MILLIGRAM(S): at 09:56

## 2024-01-01 RX ADMIN — LACTULOSE 20 GRAM(S): 10 SOLUTION ORAL at 10:48

## 2024-01-01 RX ADMIN — SODIUM CHLORIDE 125 MILLILITER(S): 9 INJECTION, SOLUTION INTRAVENOUS at 18:01

## 2024-01-01 RX ADMIN — SODIUM CHLORIDE 1000 MILLILITER(S): 9 INJECTION INTRAMUSCULAR; INTRAVENOUS; SUBCUTANEOUS at 07:54

## 2024-01-01 RX ADMIN — SODIUM CHLORIDE 150 MILLILITER(S): 9 INJECTION, SOLUTION INTRAVENOUS at 05:06

## 2024-01-01 RX ADMIN — CHLORHEXIDINE GLUCONATE 15 MILLILITER(S): 213 SOLUTION TOPICAL at 05:02

## 2024-01-01 RX ADMIN — PROPOFOL 9.6 MICROGRAM(S)/KG/MIN: 10 INJECTION, EMULSION INTRAVENOUS at 00:19

## 2024-01-01 RX ADMIN — SODIUM CHLORIDE 1000 MILLILITER(S): 9 INJECTION, SOLUTION INTRAVENOUS at 00:12

## 2024-01-01 RX ADMIN — ENOXAPARIN SODIUM 40 MILLIGRAM(S): 100 INJECTION SUBCUTANEOUS at 10:48

## 2024-01-01 RX ADMIN — Medication 40 MILLIEQUIVALENT(S): at 07:17

## 2024-01-01 RX ADMIN — Medication 50 MILLIEQUIVALENT(S): at 22:18

## 2024-01-01 RX ADMIN — Medication 8.88 MICROGRAM(S)/KG/MIN: at 07:54

## 2024-01-01 RX ADMIN — Medication 20 MICROGRAM(S): at 22:16

## 2024-01-01 RX ADMIN — PANTOPRAZOLE SODIUM 40 MILLIGRAM(S): 20 TABLET, DELAYED RELEASE ORAL at 11:24

## 2024-01-01 RX ADMIN — Medication 150 MILLIEQUIVALENT(S): at 11:49

## 2024-01-01 RX ADMIN — Medication 400 MILLIGRAM(S): at 17:41

## 2024-01-01 RX ADMIN — SODIUM CHLORIDE 1000 MILLILITER(S): 9 INJECTION INTRAMUSCULAR; INTRAVENOUS; SUBCUTANEOUS at 21:23

## 2024-01-01 RX ADMIN — VASOPRESSIN 1 UNIT(S): 20 INJECTION INTRAVENOUS at 22:23

## 2024-01-01 RX ADMIN — SODIUM CHLORIDE 1000 MILLILITER(S): 9 INJECTION INTRAMUSCULAR; INTRAVENOUS; SUBCUTANEOUS at 00:26

## 2024-01-01 RX ADMIN — SODIUM CHLORIDE 100 MILLILITER(S): 9 INJECTION, SOLUTION INTRAVENOUS at 23:35

## 2024-01-01 RX ADMIN — SODIUM CHLORIDE 1000 MILLILITER(S): 9 INJECTION, SOLUTION INTRAVENOUS at 06:21

## 2024-01-01 RX ADMIN — Medication 1 DROP(S): at 17:45

## 2024-01-01 RX ADMIN — DESMOPRESSIN ACETATE 2 MICROGRAM(S): 0.1 TABLET ORAL at 05:04

## 2024-01-01 RX ADMIN — Medication 1000 GRAM(S): at 11:49

## 2024-01-01 RX ADMIN — SODIUM CHLORIDE 40 MILLILITER(S): 5 INJECTION, SOLUTION INTRAVENOUS at 02:42

## 2024-01-01 RX ADMIN — CHLORHEXIDINE GLUCONATE 15 MILLILITER(S): 213 SOLUTION TOPICAL at 06:24

## 2024-01-01 RX ADMIN — Medication 1000 MILLIGRAM(S): at 19:00

## 2024-01-01 RX ADMIN — PIPERACILLIN AND TAZOBACTAM 25 GRAM(S): 4; .5 INJECTION, POWDER, LYOPHILIZED, FOR SOLUTION INTRAVENOUS at 21:22

## 2024-01-01 RX ADMIN — Medication 300 MILLIGRAM(S): at 15:24

## 2024-01-01 RX ADMIN — SODIUM CHLORIDE 40 MILLILITER(S): 5 INJECTION, SOLUTION INTRAVENOUS at 11:50

## 2024-01-01 RX ADMIN — CHLORHEXIDINE GLUCONATE 15 MILLILITER(S): 213 SOLUTION TOPICAL at 07:32

## 2024-01-01 RX ADMIN — SODIUM CHLORIDE 1000 MILLILITER(S): 9 INJECTION INTRAMUSCULAR; INTRAVENOUS; SUBCUTANEOUS at 08:38

## 2024-01-01 RX ADMIN — Medication 400 MILLIGRAM(S): at 03:17

## 2024-01-01 RX ADMIN — Medication 1000 MILLIGRAM(S): at 01:57

## 2024-01-01 RX ADMIN — SODIUM CHLORIDE 125 MILLILITER(S): 9 INJECTION, SOLUTION INTRAVENOUS at 21:32

## 2024-01-01 RX ADMIN — Medication 100 MILLIEQUIVALENT(S): at 00:16

## 2024-01-01 RX ADMIN — Medication 150 MEQ/KG/HR: at 23:33

## 2024-01-01 RX ADMIN — CHLORHEXIDINE GLUCONATE 1 APPLICATION(S): 213 SOLUTION TOPICAL at 05:03

## 2024-01-01 RX ADMIN — SODIUM CHLORIDE 30 MILLILITER(S): 9 INJECTION INTRAMUSCULAR; INTRAVENOUS; SUBCUTANEOUS at 10:59

## 2024-01-01 RX ADMIN — SODIUM CHLORIDE 30 MILLILITER(S): 5 INJECTION, SOLUTION INTRAVENOUS at 12:15

## 2024-01-01 RX ADMIN — PIPERACILLIN AND TAZOBACTAM 200 GRAM(S): 4; .5 INJECTION, POWDER, LYOPHILIZED, FOR SOLUTION INTRAVENOUS at 17:30

## 2024-01-01 RX ADMIN — EPINEPHRINE 1 MILLIGRAM(S): 0.3 INJECTION INTRAMUSCULAR; SUBCUTANEOUS at 21:48

## 2024-01-01 RX ADMIN — ENOXAPARIN SODIUM 40 MILLIGRAM(S): 100 INJECTION SUBCUTANEOUS at 11:49

## 2024-01-01 RX ADMIN — Medication 100 GRAM(S): at 19:33

## 2024-01-01 RX ADMIN — LACTULOSE 20 GRAM(S): 10 SOLUTION ORAL at 17:41

## 2024-01-01 RX ADMIN — ENOXAPARIN SODIUM 40 MILLIGRAM(S): 100 INJECTION SUBCUTANEOUS at 10:24

## 2024-01-01 RX ADMIN — SODIUM CHLORIDE 500 MILLILITER(S): 9 INJECTION, SOLUTION INTRAVENOUS at 04:05

## 2024-01-01 RX ADMIN — PIPERACILLIN AND TAZOBACTAM 25 GRAM(S): 4; .5 INJECTION, POWDER, LYOPHILIZED, FOR SOLUTION INTRAVENOUS at 05:39

## 2024-01-01 RX ADMIN — Medication 1 DROP(S): at 21:32

## 2024-01-01 RX ADMIN — Medication 2: at 17:33

## 2024-01-01 RX ADMIN — CHLORHEXIDINE GLUCONATE 15 MILLILITER(S): 213 SOLUTION TOPICAL at 18:57

## 2024-01-01 RX ADMIN — Medication 5 MILLIGRAM(S): at 03:55

## 2024-01-01 RX ADMIN — Medication 50 MILLIEQUIVALENT(S): at 03:07

## 2024-01-01 RX ADMIN — Medication 1 DROP(S): at 22:23

## 2024-01-01 RX ADMIN — Medication 300 MILLIGRAM(S): at 02:56

## 2024-01-01 RX ADMIN — Medication 166.67 MILLIGRAM(S): at 03:32

## 2024-01-01 RX ADMIN — CEFTRIAXONE 100 MILLIGRAM(S): 500 INJECTION, POWDER, FOR SOLUTION INTRAMUSCULAR; INTRAVENOUS at 18:57

## 2024-01-01 RX ADMIN — PANTOPRAZOLE SODIUM 40 MILLIGRAM(S): 20 TABLET, DELAYED RELEASE ORAL at 09:01

## 2024-01-01 RX ADMIN — Medication 250 MILLIGRAM(S): at 18:00

## 2024-01-01 RX ADMIN — VASOPRESSIN 1.25 UNIT(S)/HR: 20 INJECTION INTRAVENOUS at 22:22

## 2024-01-01 RX ADMIN — Medication 1000 MILLIGRAM(S): at 05:11

## 2024-01-01 RX ADMIN — PIPERACILLIN AND TAZOBACTAM 25 GRAM(S): 4; .5 INJECTION, POWDER, LYOPHILIZED, FOR SOLUTION INTRAVENOUS at 13:07

## 2024-01-01 RX ADMIN — Medication 25 MICROGRAM(S)/HR: at 22:15

## 2024-01-01 RX ADMIN — PIPERACILLIN AND TAZOBACTAM 25 GRAM(S): 4; .5 INJECTION, POWDER, LYOPHILIZED, FOR SOLUTION INTRAVENOUS at 21:52

## 2024-01-01 RX ADMIN — Medication 85 MILLIMOLE(S): at 07:37

## 2024-01-01 RX ADMIN — Medication 40 MILLIEQUIVALENT(S): at 03:13

## 2024-01-01 RX ADMIN — Medication 25 GRAM(S): at 03:13

## 2024-01-01 RX ADMIN — Medication 40 MILLIEQUIVALENT(S): at 05:03

## 2024-01-01 RX ADMIN — PANTOPRAZOLE SODIUM 40 MILLIGRAM(S): 20 TABLET, DELAYED RELEASE ORAL at 11:49

## 2024-01-01 RX ADMIN — SODIUM CHLORIDE 1000 MILLILITER(S): 9 INJECTION, SOLUTION INTRAVENOUS at 22:53

## 2024-01-01 RX ADMIN — Medication 100 MILLIEQUIVALENT(S): at 23:17

## 2024-01-01 RX ADMIN — Medication 250 MILLIGRAM(S): at 09:01

## 2024-01-01 RX ADMIN — Medication 1000 MILLIGRAM(S): at 11:00

## 2024-01-01 RX ADMIN — Medication 50 MILLIEQUIVALENT(S): at 22:17

## 2024-01-01 RX ADMIN — PIPERACILLIN AND TAZOBACTAM 200 GRAM(S): 4; .5 INJECTION, POWDER, LYOPHILIZED, FOR SOLUTION INTRAVENOUS at 02:26

## 2024-01-01 RX ADMIN — POTASSIUM PHOSPHATE, MONOBASIC POTASSIUM PHOSPHATE, DIBASIC 83.33 MILLIMOLE(S): 236; 224 INJECTION, SOLUTION INTRAVENOUS at 07:53

## 2024-01-01 RX ADMIN — Medication 5 MILLIGRAM(S): at 01:28

## 2024-01-01 RX ADMIN — Medication 50 MILLIEQUIVALENT(S): at 05:03

## 2024-01-01 RX ADMIN — Medication 3 MILLILITER(S): at 22:05

## 2024-01-01 RX ADMIN — LACTULOSE 20 GRAM(S): 10 SOLUTION ORAL at 00:16

## 2024-01-01 RX ADMIN — Medication 100 GRAM(S): at 03:51

## 2024-01-01 RX ADMIN — LACTULOSE 20 GRAM(S): 10 SOLUTION ORAL at 05:02

## 2024-01-01 RX ADMIN — Medication 300 MILLIGRAM(S): at 15:29

## 2024-01-01 RX ADMIN — CHLORHEXIDINE GLUCONATE 1 APPLICATION(S): 213 SOLUTION TOPICAL at 06:22

## 2024-01-01 RX ADMIN — Medication 400 MILLIGRAM(S): at 01:16

## 2024-01-01 RX ADMIN — Medication 50 MILLIEQUIVALENT(S): at 07:17

## 2024-01-01 RX ADMIN — CHLORHEXIDINE GLUCONATE 15 MILLILITER(S): 213 SOLUTION TOPICAL at 17:30

## 2024-01-01 RX ADMIN — PROPOFOL 2.84 MICROGRAM(S)/KG/MIN: 10 INJECTION, EMULSION INTRAVENOUS at 04:09

## 2024-01-01 RX ADMIN — Medication 7.5 MICROGRAM(S)/KG/MIN: at 22:54

## 2024-01-01 RX ADMIN — Medication 8.88 MICROGRAM(S)/KG/MIN: at 06:41

## 2024-01-01 RX ADMIN — Medication 100 MILLIEQUIVALENT(S): at 22:15

## 2024-01-01 RX ADMIN — SODIUM CHLORIDE 150 MILLILITER(S): 9 INJECTION, SOLUTION INTRAVENOUS at 06:21

## 2024-01-01 RX ADMIN — Medication 1 DROP(S): at 18:48

## 2024-01-01 RX ADMIN — CHLORHEXIDINE GLUCONATE 15 MILLILITER(S): 213 SOLUTION TOPICAL at 17:41

## 2024-01-01 RX ADMIN — SODIUM CHLORIDE 150 MILLILITER(S): 9 INJECTION, SOLUTION INTRAVENOUS at 14:14

## 2024-04-11 PROBLEM — G47.00 INSOMNIA: Status: ACTIVE | Noted: 2024-01-01

## 2024-04-11 PROBLEM — Z11.3 ROUTINE SCREENING FOR STI (SEXUALLY TRANSMITTED INFECTION): Status: ACTIVE | Noted: 2024-01-01

## 2024-04-11 PROBLEM — E78.5 HYPERLIPIDEMIA, UNSPECIFIED HYPERLIPIDEMIA TYPE: Status: ACTIVE | Noted: 2023-01-01

## 2024-04-11 PROBLEM — M79.7 FIBROMYALGIA: Status: ACTIVE | Noted: 2024-01-01

## 2024-04-11 NOTE — END OF VISIT
[] : Resident [FreeTextEntry3] : 44F presenting to [facility] for follow up and medication refills. Earlier this year, she was hospitalized for 21 days after a suicide attempt (jumped onto train tracks). Has seen her psychiatrist at [facility] twice since then, but does not have insurance coverage currently and requests medication refills. Has [place] Medicaid plan which needs to be terminated prior to her obtaining [place] medicaid. She reports that the [facility] plan will be terminated on 5/1. Armenian and English speaking. Well appearing on exam. Dr. Regalado reviewed medical and psychiatric meds in detail and updated pt's list. Dr. Regalado spoke with staff at [facility] and confirmed psych medications. We will send 1 month of medications to [facility] the [facility], hopefully [place] medicaid will be activated in the next 1-2 months for future refills. Pt has psych follow up scheduled 4/16. RTC May, earlier prn.

## 2024-04-11 NOTE — HISTORY OF PRESENT ILLNESS
[FreeTextEntry1] : medication refills [de-identified] : This is a 43yo F with PMHx of HTN, HLD, RA, chronic GERD, hemorrhoids, chronic MARCELL, bipolar 1 dx, anxiety, and fibromyalgia, presenting.for med refills. She follows with a psychiatrist at [facility]. She has run out of her prozac three days ago. On this medication regimen (wellbutrin, abilify, prozac, seroquel, and lithium) she says she feels good, denies any recent SI/HI, anxiety, severe depression, appetite or sleep changes, or related symptoms.

## 2024-04-25 PROBLEM — I10 PRIMARY HYPERTENSION: Status: ACTIVE | Noted: 2023-01-01

## 2024-04-25 PROBLEM — K59.00 CONSTIPATION: Status: ACTIVE | Noted: 2023-01-01

## 2024-04-25 PROBLEM — J02.9 ACUTE PHARYNGITIS: Status: ACTIVE | Noted: 2024-01-01

## 2024-04-25 PROBLEM — D50.9 IRON DEFICIENCY ANEMIA: Status: ACTIVE | Noted: 2024-01-01

## 2024-04-25 PROBLEM — Z00.00 ENCOUNTER FOR PREVENTIVE HEALTH EXAMINATION: Status: ACTIVE | Noted: 2023-01-01

## 2024-04-25 PROBLEM — K21.9 CHRONIC GERD: Status: ACTIVE | Noted: 2023-01-01

## 2024-04-25 PROBLEM — J45.909 ASTHMA, UNSPECIFIED ASTHMA SEVERITY, UNSPECIFIED WHETHER COMPLICATED, UNSPECIFIED WHETHER PERSISTENT: Status: ACTIVE | Noted: 2023-01-01

## 2024-04-25 PROBLEM — F31.9 BIPOLAR DEPRESSION: Status: ACTIVE | Noted: 2024-01-01

## 2024-04-25 PROBLEM — F41.9 ANXIETY AND DEPRESSION: Status: ACTIVE | Noted: 2023-01-01

## 2024-04-25 NOTE — ASSESSMENT
[FreeTextEntry1] : 45yo F with PMH of HTN, HLD, RA, chronic GERD, hemorrhoids, chronic MARCELL, bipolar 1 dx, anxiety/MDD, lupus, and fibromyalgia, presenting for med refill and formal med rec as we have had differing reports as to med doses and concern for a sore throat.  #Pharyngitis #Sore Throat #Congestion 3-4 days of throat pain and mild SOB when walking. Mild couhg as well without phlegm production. Went to a pharmacist and was given amoxicillin 500mg tid x 7 days for which she is on day 4. Likely etiology is that of potential strep pharyngitis for which she was given the antibiotic. Cannot r/o allergies contrbuting as well as it is the season for this to be exacerabated. In addition, cannot r/o asthma contributing as she does have seasonal allergy related flares, feels much improved however on the amoxicillin. No exudates on exam. - change amoxicillin to 500mg bid until running out of the pills (better data to extend the course and make it bid than shorter course and tid) - guaifenesin to [facility] to help with bringing up some phlegm/decongesting  #R sided pustule of the face Non infected appearing. Non painful, intermittently itchy, feels warm however.  - advised not to open it up - supportive care with warm wash clothe, keep area clean   #Asthma Mild in nature, rarely has to use the albuterol inhaler. No wheeze on exam. - resend albuterol inhaler to [facility]  #Anxiety #Depression She follows with a psychiatrist at [facility]. Saw them last week and the visit went well. Would like an updated and correct dose of her seroquel sent to the pharmacy as the one we had sent was incorrect, states she is on seroquel 100mg qd and we sent 25g . No thoughts of SI/HI at this time.  - will send correct dose of seroquel and lithium as per med rec above - reinforced the need to do an EKG and lab work given the increased dose of these meds - will hold off on adjusting prozac at this time while adjusting the other medications  #Constipation History of constipation, last BM 3-4 days ago - send miralax and dulcolax to [facility]   #Med Rec: Albuterol inhaler prn (needs refill) Amlodipine 10mg qd Atorvastatin 40mg qhs Buproprion 150mg qd Carvedilol 6.25mg bid Dulocolax 5mg qd Miralax  Famotidine 20mg qhs Omeprazole 40mg qhs Ferrous sulfate 325mg qd Seroquel 25mg QD (seroquel per patient is 100mg qd) HCTZ 25mg qd Leucovorin 5mg q1week Lithium carb 150mg tabs (450mg in AM, 450mg QPM) Gabapentin 800mg tid Fluoxetine (prozac) HCl 10mg Methotrexate 2.5mg 3 tabs q1week Humira injection (not taking due to cost) Multivitamin Magnesium supplement Vit D supplement Meloxicam 15mg qd prn  Naproxen 250mg qd prn  HCM  - will go for lab work (it is ordered, she will go)  - ecg ordered - will come to clinic as well

## 2024-04-25 NOTE — HISTORY OF PRESENT ILLNESS
[FreeTextEntry1] : here for follow up [de-identified] : 45yo F with PMH of HTN, HLD, RA, chronic GERD, hemorrhoids, chronic MARCELL, bipolar 1 dx, anxiety/MDD, lupus, and fibromyalgia, presenting for med refill and concern for a sore throat.  She has noticed recently she has had trouble catching her breath recently when walking, for about 3-4 days. She notes she has been around some people with sickness. She has had some bad pain in the throat and a cough as well. Of note, she went 3 days ago to a pharmacy and got amoxicillin for a 7 day course. It is unclear who prescribed the amoxicillin. The dose was 500mg TID for 7 days, currently on day 4 of the antibiotic. Has a history of tonsillectomy, performed when she was a kid. Did have lymph node enlargement in the neck, and a fever. Overall now the cough is improved but is still here, not bringing up anything, throat pain is gone.   She has a pustule on the R side of her face for one day. She had a red butterfly like rash yesterday on her bilaterally which she has had before iso her   lupus. The pustue she has never had before. She has only tried moisturizer cream on it. Has never had this before. Non painful, intermittently itchy, feels warm however.   She follows with a psychiatrist at [facility]. Saw them last week and the visit went well. Would like an updated and correct dose of her seroquel sent to the pharmacy as the one we had sent was incorrect, states she is on seroquel 100mg qd and we sent 25g . No thoughts of SI/HI at this time.   #Med Rec: Albuterol inhaler prn (needs refill) Amlodipine 10mg qd Atorvastatin 40mg qhs Buproprion 150mg qd Carvedilol 6.25mg bid Dulocolax 5mg qd Miralax  Famotidine 20mg qhs Omeprazole 40mg qhs Ferrous sulfate 325mg qd Seroquel 25mg QD (seroquel per patient is 100mg qd) HCTZ 25mg qd Leucovorin 5mg q1week Lithium carb 150mg tabs (450mg in AM, 450mg QPM) Gabapentin 800mg tid Fluoxetine (prozac) HCl 10mg Methotrexate 2.5mg 3 tabs q1week Humira injection (not taking due to cost) Multivitamin Magnesium supplement Vit D supplement Meloxicam 15mg qd prn  Naproxen 250mg qd prn  Needs refills on: miralax, dulcolax, lithium, seroquel (at correct dose) and albuterol   HCM  - will go for lab work (it is ordered, she will go)

## 2024-04-25 NOTE — PHYSICAL EXAM
[No Acute Distress] : no acute distress [No JVD] : no jugular venous distention [No Respiratory Distress] : no respiratory distress  [No Accessory Muscle Use] : no accessory muscle use [Clear to Auscultation] : lungs were clear to auscultation bilaterally [Normal Rate] : normal rate  [Regular Rhythm] : with a regular rhythm [No Murmur] : no murmur heard [Normal] : normal rate, regular rhythm, normal S1 and S2 and no murmur heard [Normal Anterior Cervical Nodes] : no anterior cervical lymphadenopathy [de-identified] : very mild pharyngeal erythema without exudate  [de-identified] : mildly distended abdomen, non TTP

## 2024-04-25 NOTE — END OF VISIT
[] : Resident [FreeTextEntry3] :  44 year old F presenting to shelter clinic for follow up. Reports URI symptoms, sore throat. COVID test negative today. Was evaluated at a pharmacy for sore throat and prescribed amoxicillin, reports sore throat improving but continued cough and wheeze. Uses an albuterol inhaler, doesn't have one currently and requests refill. There were multiple discrepancies on med rec last visit, Dr. Bartlett went through all of her medication bottles with her today and updated her list, sent refills for missing prescriptions. Pt again notes that [facility] will inactivate May 1st, hopefully will have [place] Medicaid reinstated shortly after. For now, sent meds to [facility]. Continue supportive care for viral URI, prescribed albuterol inhaler. RTC 1 month for f/u above, earlier prn.

## 2024-04-25 NOTE — REVIEW OF SYSTEMS
[Constipation] : constipation [Negative] : Musculoskeletal [Fever] : no fever [Chills] : no chills [Fatigue] : no fatigue

## 2024-05-09 PROBLEM — I10 ESSENTIAL HYPERTENSION: Status: ACTIVE | Noted: 2024-01-01

## 2024-05-09 PROBLEM — S76.011A STRAIN OF RIGHT HIP: Status: ACTIVE | Noted: 2024-01-01

## 2024-05-17 NOTE — HISTORY OF PRESENT ILLNESS
[de-identified] : 43 y/o F with PMH of HTN, HLD, RA, chronic GERD, hemorrhoids, chronic MARCELL, bipolar 1 dx, anxiety/MDD, bipolar with paula, lupus, and fibromyalgia, presenting for RLQ pain.  She reports she fell yesterday on her right side, has a little knee pain from it but mostly the lower right abdomen.  Pain is worsened by walking , alleviated with rest, she is able to bear weight.  Not relieved with Meloxicam.  LMP 11 days ago, finished 4 days ago.  Pain has never happened before.  Saw a gynecologist last year, has history of fibroids.  No history of ovarian cysts.  ROS otherwise negative.

## 2024-05-19 NOTE — ED ADULT TRIAGE NOTE - BP NONINVASIVE DIASTOLIC (MM HG)
Procedure:  Left medialization thyroplasty, with transnasal fiberoptic laryngoscopy for visualization   Pre-operative diagnosis:  Dysphonia; left vocal fold paresis  Post-operative diagnosis: As above  Indications:  The patient is a 36 year old female with dysphonia who presented with left true vocal fold paresis characterized by brisk abduction and incomplete adduction. This led to a large glottic gap, glottal insufficiency, and associated breathiness and diplophonia.She desired a permanent treatment for this after a prior vocal fold injection had demonstrated but temporary benefit. After discussion of risks, benefits, and alternatives, she opted to proceed. Silastic was selected as a medialization material since it is permanent but reversible.  Findings: Left true vocal fold with atrophy, limited adduction.  EBL: < 5 cc   Anesthesia: MAC   Specimens: None   Description of procedure:   After written informed consent was completed, the patient presented for the procedures listed above. She was placed supine on the OR table and after injection of 7 cc of 1% lidocaine with 1:100,000 epinephrine mixed 1:1 with 0.5% bupivicaine with 1:200,000 epinephrine into the anticipated operative site, the area was prepped and draped in the usual sterile fashion. A timeout was called to confirm patient identity and procedure.   An incision was sharply made in a skin crease at the midpoint of the thyroid cartilage that extended from just past midline to the lateral thyroid cartilage. The platysma was divided and subplatysmal dissections were completed to allow good exposure of the thyroid cartilage. Multiple anterior jugular vein branches were encountered; these were divided and tied as needed. The strap muscles were divided along the midline raphe. The perichondrium was then incised a few mm right of midline, and a subperichondrial flap was developed. As needed, bipolar cautery was sparingly used to maintain hemostasis. The window  sizer was then used to cele out a window whose anterior portion was 5 mm back from midline and 3 mm above the inferior border of the thyroid cartilage. A  drill was used with 2 mm cutting, then jessica, burrs to drill out the window. The remainder of the edges of the window were cleaned withKerrison forceps. The perichondrium was incised and removed from the window. The area surrounding the window was then elevated along the medial surface of the thyroid cartilage with the perichondrium left attached to the cartilage.   Once the window was developed, the implant sizer was used to simulate medialization by an implant. This was done under visualization with flexible transnasal laryngoscopy. Multiple trials were done while the patient phonated multiple tasks. Maximum phonation time of approximately 9 seconds was noted. The patient consistently demonstrated her best voice with the point of maximal medialization 6 mm deep, 6 mm back from the anterior aspect, and 2.5 mm above the inferior aspect of the window. A custom implant was then carved from a left Phonoform implant block to reflect those dimensions. The implant was thoroughly washed with saline. The implant fit in the window securely.   After it was confirmed that both the voice quality and the appearance on laryngoscopy were optimized, the endoscope was withdrawn. The implant was secured with 4-O Prolene and the voice was again tested. At this point in the procedure there was some strain as expected given some operative edema, but no further diplophonia.  The wound was again inspected thoroughly; no bleeding was observed. The strap muscles were reapproximated. A small vessel loop drain was placed along the operative site and brought out through the skin, where it was secured with a suture to the dressing. The deep layer was closed with 3-O Vicryl followed by 4-O Vicryl for the subcutaneous layer. Skin adhesive and tape were then applied. Care of the patient was  returned to the anesthesia team.   Complications: None apparent   Disposition: Stable to PACU.     40

## 2024-05-19 NOTE — ED ADULT NURSE NOTE - OBJECTIVE STATEMENT
pt brought in intubated and bagged by ems. per ems pt was found with head in trash can? possible overdose given needles surrounding pt. was asystole on monitor. pt coded for around 15 mins, given 2 amps of epi and intubated.

## 2024-05-19 NOTE — ED PROVIDER NOTE - PROGRESS NOTE DETAILS
for [facility] -[phone]   [phone]  [facility] cell - [phone] spoke w/ Giuseppe from the shelter- he says pt has no known hx of drug use, was seen in usual state of health tonight walking around, normal. Then other residents saw someone give pt 2 yellow packets (this person is known drug user, mostly crack), pt was  found slumped over in her bed shortly after, staff said she was unresponsive w/ weak pulse and then became pulseless, he initiated chest compressions and called EMS.

## 2024-05-19 NOTE — H&P ADULT - ATTENDING COMMENTS
45 y/o woman admitted to MICU after out of hospital cardiac arrest and in hospital arrest in ED. Suspected drug overdose as patient was found with pipes and needles, Utox positive for cocaine. CTH concerning for significant anoxic brain injury and patient without pupillary, corneal, or gag reflex on exam with fixed and dilated pupils. Possible Cushing reflex (absent bradycardia) in the setting of suspected increased ICP from cerebral edema. CT chest with bilateral lower lobe consolidations likely aspiration. CT abdomen with significantly dilated loops of bowel. Hemodynamically stable and hypertensive. Patient with very poor prognosis, team spoke to daughter who is in [place] over the phone and wants to maintain full code for now, but planning to come here ASAP.     Plan  - Very poor prognosis  - Prevention of fever, maintain normothermia  - Continuous EEG  - Empiric antibiotics, infectious workup sent  - NG decompression  - Formal TTE  - Lung protective ventilation  - Hyperventilating to combat metabolic acidosis and for suspected increased ICP  - Trend lactate for improvement  - Palliative consult in AM and continued GOC discussions

## 2024-05-19 NOTE — H&P ADULT - HISTORY OF PRESENT ILLNESS
Patient is a 45 yo F with PMHx of HTN, HLD, RA, chronic GERD, hemorrhoids, chronic MARCELL, anxiety/MDD, bipolar with paula, lupus, and fibromyalgia presenting to [facility] ED after cardiac arrest. Patient was found down, apneic and pulseless at [facility] with drug paraphernalia (crack pipe, needles) around her. Unclear how long patient was down for. Given intranasal narcan at the scene without any changes. 15 minutes of ACLS was performed in the field w/ ROSC. Received 2 of epi, and was intubate. She because hypotensive enroute and was given a third dose of epi. She arrived in the ED and pulse was lost and again one more round of compressions performed w/ ROSC. Patient was started on pressor support.     Of note, she was seen at [facility] 3 days ago 5/17 for RLQ pain. LMP 5/6.     In the ED:  Initial vital signs: T: 97.3F, HR: 73, BP:76/40 R:  SpO2: 91% on BVM  Labs:  Hgb 10.6; pH 6.99; lactate 7.2; Na 132; HCO3 17; ; ; UTox   Imaging:   Interventions: 1L LR; Levophed; 50meq bicarb x2 and bicarb drip @150mL/hr   Patient is a 43 yo F with PMHx of HTN, HLD, RA, chronic GERD, hemorrhoids, chronic MARCELL, anxiety/MDD, bipolar with paula, lupus, and fibromyalgia presenting to [facility] ED after cardiac arrest. Patient was found down, apneic and pulseless at [facility] with drug paraphernalia (crack pipe, needles) around her. Unclear how long patient was down for. Given intranasal narcan at the scene without any changes. 15 minutes of ACLS was performed in the field w/ ROSC. Received 2 of epi, and was intubate. She because hypotensive enroute and was given a third dose of epi. She arrived in the ED and pulse was lost and again one more round of compressions performed w/ ROSC. Patient was started on pressor support.     Of note, she was seen at [facility] 3 days ago 5/17 for RLQ pain. LMP 5/6.   Spoke with daughter who lives in [place]. She is flying in from [place]     In the ED:  Initial vital signs: T: 97.3F, HR: 73, BP:76/40 R:  SpO2: 91% on BVM  Labs:  Hgb 10.6; pH 6.99; lactate 7.2; Na 132; HCO3 17; ; ; UTox   Imaging:   Interventions: 1L LR; Levophed; 50meq bicarb x2 and bicarb drip @150mL/hr

## 2024-05-19 NOTE — H&P ADULT - [id number]_GEN_A_NCS
Phoned patient to schedule f/u visit and discuss overdue HM items, unable to reach and no voicemail set up. Emailed patient regarding needed visit and HM items.   Unable to assess due to patient's cognitive impairment

## 2024-05-19 NOTE — ED PROVIDER NOTE - OBJECTIVE STATEMENT
62yF w/ HTN HLD RA GERD bipolar, anxiety, depression, lupus, fibromyalgia, brought in by EMS post-cardiac arrest.  Per EMS, pt was found at the shelter where she resides, w/ drug paraphernalia around her (crack pipe, needles), was reportedly apneic and pulseless, unknown down time. Was given intranasal narcan w/o change, ACLS performed for 15 min w/ ROSC, pt got 2 epi, was intubated, was in asystole. EMS says pt was hypotensive and given a third push of epi en route.

## 2024-05-19 NOTE — H&P ADULT - ASSESSMENT
45 y/o F with PMH of HTN, HLD, RA, chronic GERD, hemorrhoids, chronic MARCELL, bipolar 1 dx, anxiety/MDD, bipolar with paula, lupus, and fibromyalgia ICU consulted for post cardiac arrest care likely 2/2 drug overdose    Neuro  #Metabolic encephalopathy with concern for anoxic brain injury  UTox positive for cocaine. Briefly on pressor support. Now weaned.   CT Head wet read - anoxic brain injury without signs of bleeding  - intubated   - f/u official CTH read  - GOC     #Anxiety / Depression  #Bipolar 1 Diagnosis  on lithium.   - lithium level 0.26    Pulm  On ventilator. VBG consistent with respiratory acidosis.  - f/u ABG    Cards  #Cardiac Arrest  ACLS x2. ROSC obtained. pH on VBG 6.9 with pattern consistent with respiratory acidosis.     #HTN  home medications Losartan 100mg, HCTZ 25mg, Norvasc 6.25 BID   - holding home meds    #HLD  home medication Atorvastatin 40mg    - C/w Lipitor 40    GI  #Transaminitis  Likely i/s/o cardiac arrest  - continue to monitor  - f/u CT Abdomen    #Chronic constipation  Home medications: Dulcolax and Miralax qd for constipation    #Chronic GERD  Home medication: omeprazole 40 qd    Renal  JADE    #RA  Managed on Humira 40mg, MTX 2.5mg, and Naproxen PRN.    - hold Humira, MTX, leukovorin, and Naproxen    Endo  JADE    ID  JADE    Heme  #Chronic MARCELL  Low HGB as of 08/2023, per outside records  Home medication: Ferrous Sulfate    PPX  F: s/p 1L LR  E: Replete  N: NPO  DVT ppx:   Dispo: MICU     FULL CODE     45 y/o F with PMH of HTN, HLD, RA, chronic GERD, hemorrhoids, chronic MARCELL, bipolar 1 dx, anxiety/MDD, bipolar with paula, lupus, and fibromyalgia ICU consulted for post cardiac arrest care likely 2/2 drug overdose    Neuro  #Metabolic encephalopathy with concern for anoxic brain injury  UTox positive for cocaine. Briefly on pressor support. Now weaned.   CT Head wet read - anoxic brain injury without signs of bleeding  - intubated   - f/u official CTH read  - GOC     #Anxiety / Depression  #Bipolar 1 Diagnosis  on lithium.   - lithium level 0.26    Pulm  On ventilator. VBG consistent with respiratory acidosis.  Current settings: FIO2 40; ; RR 24; PEEP 5  - f/u serial ABGs    Cards  #Cardiac Arrest  ACLS x2. ROSC obtained. pH on VBG 6.9 with pattern consistent with respiratory acidosis.     #HTN  home medications Losartan 100mg, HCTZ 25mg, Norvasc 6.25 BID   - holding home meds    #HLD  home medication Atorvastatin 40mg    - C/w Lipitor 40    GI  #Transaminitis  Likely i/s/o cardiac arrest  - continue to monitor  - f/u CT Abdomen    #Chronic constipation  Home medications: Dulcolax and Miralax qd for constipation    #Chronic GERD  Home medication: omeprazole 40 qd    Renal  JADE    #RA  Managed on Humira 40mg, MTX 2.5mg, and Naproxen PRN.    - hold Humira, MTX, leukovorin, and Naproxen    Endo  JADE    ID  JADE    Heme  #Chronic MARCELL  Low HGB as of 08/2023, per outside records  Home medication: Ferrous Sulfate    PPX  F: s/p 1L LR  E: Replete  N: NPO  DVT ppx:   Dispo: MICU     FULL CODE     45 y/o F with PMH of HTN, HLD, RA, chronic GERD, hemorrhoids, chronic MARCELL, bipolar 1 dx, anxiety/MDD, bipolar with paula, lupus, and fibromyalgia ICU consulted for post cardiac arrest care likely 2/2 drug overdose    Neuro  #Anoxic brain injury 2/2 to cardiiac arrest  UTox positive for cocaine. Briefly on pressor support. Now weaned.   CT Head - effacement of the cerebral sulci, ventricles and cisternal spaces secondary to cerebral edema. Findings consistent with anoxic brain injury.  - Given anoxic brain injury, aim for permissive hypertension for adequate perfusion in the setting of unknown ICP, aim for MAP >80  - given cerebral edema, would aim for permissive hypocapnia  - goal sodium 150, start hypertonic saline; goal 140 by AM  - q4 BMP  - targeted temperature managment, maintain normothermia  - vEEG  - GOC and Palliative consult in AM    #Anxiety / Depression  #Bipolar 1 Diagnosis  on lithium.   - lithium level 0.26  - hold home med    #Sedated  #Intubated  - on Propofol    Pulm  On ventilator. VBG consistent with respiratory acidosis.  Current settings: FIO2 40; ; RR 24; PEEP 5  Cheye dubois breathing noted  - f/u serial ABGs  - started on vanc and zosyn for empiric coverage  - f/u blood cultures    Cards  #Cardiac Arrest  ACLS x2. ROSC obtained. pH on VBG 6.9 with pattern consistent with respiratory acidosis.   UTox positive for cocaine; however no signs of ACS with negative trops and non-ischemic EKG. High suspicion of fentanyl overdose  - f/u fentanyl level  - f/u TTE  - trend lactate   - Pacer pads in place   - follow cerebral edema/neuroprotective protocol outlined above     #HTN  home medications Losartan 100mg, HCTZ 25mg, Norvasc 6.25 BID   - holding home meds    #HLD  home medication Atorvastatin 40mg    - C/w Lipitor 40    GI  #Transaminitis  Likely i/s/o cardiac arrest. distension on physical exam  - continue to monitor  - f/u CT Abdomen    #Increased Gastric contents  - OG decompression    #Chronic constipation  Home medications: Dulcolax and Miralax qd for constipation    #Chronic GERD  Home medication: omeprazole 40 qd    Renal  JADE    #RA  Managed on Humira 40mg, MTX 2.5mg, and Naproxen PRN.    - hold Humira, MTX, leukovorin, and Naproxen    Endo  JADE    ID  JADE    Heme  #Chronic MARCELL  Low HGB as of 08/2023, per outside records  Home medication: Ferrous Sulfate    PPX  F: s/p 1L LR  E: Replete  N: NPO  DVT ppx:   Dispo: MICU     FULL CODE       Neuro  #Anoxic brain injury 2/2 to cardiiac arrest  UTox positive for cocaine. Briefly on pressor support. Now weaned.   CT Head - effacement of the cerebral sulci, ventricles and cisternal spaces secondary to cerebral edema. Findings consistent with anoxic brain injury.  - Given anoxic brain injury, aim for permissive hypertension for adequate perfusion in the setting of unknown ICP, aim for MAP >80  - given cerebral edema, would aim for permissive hypocapnia  - goal sodium 150, start hypertonic saline; goal 140 by AM  - q4 BMP  - targeted temperature managment, maintain normothermia  - vEEG  - GOC and Palliative consult in AM    #Anxiety / Depression  #Bipolar 1 Diagnosis  on lithium.   - lithium level 0.26  - hold home med    #Sedated  #Intubated  - on Propofol    Pulm  #Intubation  On ventilator. VBG consistent with respiratory acidosis.  Current settings: FIO2 40; ; RR 24; PEEP 5  Cheye dubois breathing noted  - f/u serial ABGs    #Aspiration  CT chest - bilateral consolidations in lung bases, pending radiology read  - f/u CT Chest read  - started on vanc and zosyn for empiric coverage  - f/u blood cultures    Cards  #Cardiac Arrest  ACLS x2. ROSC obtained. pH on VBG 6.9 with pattern consistent with respiratory acidosis.   UTox positive for cocaine; however no signs of ACS with negative trops and non-ischemic EKG. High suspicion of fentanyl overdose  - f/u fentanyl level  - f/u TTE  - trend lactate   - Pacer pads in place   - follow cerebral edema/neuroprotective protocol outlined above     #HTN  home medications Losartan 100mg, HCTZ 25mg, Norvasc 6.25 BID   - holding home meds    #HLD  home medication Atorvastatin 40mg    - C/w Lipitor 40    GI  #Transaminitis  Likely i/s/o cardiac arrest. distension on physical exam  - continue to monitor  - f/u CT Abdomen    #Increased Gastric contents  - OG decompression    #Chronic constipation  Home medications: Dulcolax and Miralax qd for constipation    #Chronic GERD  Home medication: omeprazole 40 qd    Renal  - f/u urine lytes    Rheum  #RA  Managed on Humira 40mg, MTX 2.5mg, and Naproxen PRN.    - hold Humira, MTX, leukovorin, and Naproxen    Endo  JADE    ID  JADE    Heme  #Chronic MARCELL  Low HGB as of 08/2023, per outside records  Home medication: Ferrous Sulfate    PPX  F: s/p 1L LR  E: Replete  N: NPO  DVT ppx: SCDs  Dispo: MICU     FULL CODE       Neuro  #Anoxic brain injury 2/2 to cardiiac arrest  UTox positive for cocaine. Briefly on pressor support. Now weaned.   CT Head - effacement of the cerebral sulci, ventricles and cisternal spaces secondary to cerebral edema. Findings consistent with anoxic brain injury.  - Given anoxic brain injury, aim for permissive hypertension for adequate perfusion in the setting of unknown ICP, aim for MAP >80  - given cerebral edema, would aim for permissive hypocapnia  - goal sodium 150, start hypertonic saline; goal 140 by AM  - q4 BMP  - targeted temperature managment, maintain normothermia  - vEEG  - f/u neuron specific enolase  - GOC and Palliative consult in AM  - Neuro consult    #Anxiety / Depression  #Bipolar 1 Diagnosis  on lithium.   - lithium level 0.26  - hold home med    #Sedated  #Intubated  - on Propofol    Pulm  #Intubation  On ventilator. VBG consistent with respiratory acidosis.  Current settings: FIO2 40; ; RR 24; PEEP 5  Cheye dubois breathing noted  - f/u serial ABGs    #Aspiration  CT chest - bilateral consolidations in lung bases, pending radiology read  - f/u CT Chest read  - started on vanc and zosyn for empiric coverage  - f/u blood cultures    Cards  #Cardiac Arrest  ACLS x2. ROSC obtained. pH on VBG 6.9 with pattern consistent with respiratory acidosis.   UTox positive for cocaine; however no signs of ACS with negative trops and non-ischemic EKG. High suspicion of fentanyl overdose  - f/u fentanyl level  - f/u TTE  - trend lactate   - Pacer pads in place   - follow cerebral edema/neuroprotective protocol outlined above     #HTN  home medications Losartan 100mg, HCTZ 25mg, Norvasc 6.25 BID   - holding home meds    #HLD  home medication Atorvastatin 40mg    - C/w Lipitor 40    GI  #Transaminitis  Likely i/s/o cardiac arrest. distension on physical exam  - continue to monitor  - f/u CT Abdomen    #Increased Gastric contents  - OG decompression    #Chronic constipation  Home medications: Dulcolax and Miralax qd for constipation    #Chronic GERD  Home medication: omeprazole 40 qd    Renal  - f/u urine lytes    Rheum  #RA  Managed on Humira 40mg, MTX 2.5mg, and Naproxen PRN.    - hold Humira, MTX, leukovorin, and Naproxen    Endo  JADE    ID  JADE    Heme  #Chronic MARCELL  Low HGB as of 08/2023, per outside records  Home medication: Ferrous Sulfate    PPX  F: s/p 1L LR  E: Replete  N: NPO  DVT ppx: SCDs  Dispo: MICU     FULL CODE       Neuro  #Anoxic brain injury 2/2 to cardiiac arrest  UTox positive for cocaine. Briefly on pressor support. Now weaned.   CT Head - effacement of the cerebral sulci, ventricles and cisternal spaces secondary to cerebral edema. Findings consistent with anoxic brain injury.  - Given anoxic brain injury, aim for permissive hypertension for adequate perfusion in the setting of unknown ICP, aim for MAP >80  - given cerebral edema, would aim for permissive hypocapnia  - goal sodium 150, start hypertonic saline; goal 140 by AM  - q4 BMP  - targeted temperature managment, maintain normothermia  - vEEG  - f/u neuron specific enolase  - GOC and Palliative consult in AM  - Neuro consult    #Anxiety / Depression  #Bipolar 1 Diagnosis  on lithium.   - lithium level 0.26  - hold home med    #Sedated  #Intubated  - on Propofol    Pulm  #Intubation  On ventilator. VBG consistent with respiratory acidosis.  Current settings: FIO2 40; ; RR 24; PEEP 5  Cheye dubois breathing noted  - f/u serial ABGs    #Aspiration  CT chest - bilateral consolidations in lung bases, pending radiology read  - f/u CT Chest read  - started on vanc and zosyn for empiric coverage  - f/u blood cultures    Cards  #Cardiac Arrest  ACLS x2. ROSC obtained. pH on VBG 6.9 with pattern consistent with respiratory acidosis.   UTox positive for cocaine; however no signs of ACS with negative trops and non-ischemic EKG. High suspicion of fentanyl overdose  - f/u fentanyl level  - f/u TTE  - trend lactate   - Pacer pads in place   - follow cerebral edema/neuroprotective protocol outlined above     #HTN  home medications Losartan 100mg, HCTZ 25mg, Norvasc 6.25 BID   - holding home meds    #HLD  home medication Atorvastatin 40mg    - C/w Lipitor 40    GI  #Transaminitis  Likely i/s/o cardiac arrest. distension on physical exam  - continue to monitor  - f/u CT Abdomen    #Ileus  likely 2/2 to fentanyl overdose. Found with drug paraphernalia nearby.  CT Abdomen -colon dilated to 8 cm with gas and semi solid/liquid stool. Stomach distended with gas and fluid.   - OG decompression  - rectal tube  - Reglan vs erythromycin vs peripherally acting opoid antagonist if not responding to above therapy    #Chronic constipation  Home medications: Dulcolax and Miralax qd for constipation    #Chronic GERD  Home medication: omeprazole 40 qd    Renal  - f/u urine lytes    Rheum  #RA  Managed on Humira 40mg, MTX 2.5mg, and Naproxen PRN.    - hold Humira, MTX, leukovorin, and Naproxen    Endo  JADE    ID  JADE    Heme  #Chronic MARCELL  Low HGB as of 08/2023, per outside records  Home medication: Ferrous Sulfate    PPX  F: s/p 1L LR  E: Replete  N: NPO  DVT ppx: SCDs  Dispo: MICU     FULL CODE       Neuro  #Anoxic brain injury 2/2 to cardiiac arrest  UTox positive for cocaine. Briefly on pressor support. Now weaned.   CT Head - effacement of the cerebral sulci, ventricles and cisternal spaces secondary to cerebral edema. Findings consistent with anoxic brain injury.  - Given anoxic brain injury, aim for permissive hypertension for adequate perfusion in the setting of unknown ICP, aim for MAP >80  - given cerebral edema, would aim for permissive hypocapnia  - goal sodium 150, start hypertonic saline; goal 140 by AM  - q4 BMP  - targeted temperature managment, maintain normothermia  - vEEG  - f/u neuron specific enolase  - GOC and Palliative consult in AM  - Neuro consult    #Anxiety / Depression  #Bipolar 1 Diagnosis  on lithium.   - lithium level 0.26  - hold home med    #Sedated  #Intubated  - on Propofol    Pulm  #Intubation  On ventilator. VBG consistent with respiratory acidosis.  Current settings: FIO2 40; ; RR 24; PEEP 5  Cheye dubois breathing noted  - f/u serial ABGs    #Aspiration  CT chest - bilateral consolidations in lung bases, pending radiology read  - f/u CT Chest read  - started on vanc and zosyn for empiric coverage  - f/u blood cultures    Cards  #Cardiac Arrest  ACLS x2. ROSC obtained. pH on VBG 6.9 with pattern consistent with respiratory acidosis.   UTox positive for cocaine; however no signs of ACS with negative trops and non-ischemic EKG. High suspicion of fentanyl overdose  - f/u fentanyl level  - f/u TTE  - trend lactate   - Pacer pads in place   - follow cerebral edema/neuroprotective protocol outlined above     #HTN  home medications Losartan 100mg, HCTZ 25mg, Norvasc 6.25 BID   - holding home meds    #HLD  home medication Atorvastatin 40mg    - C/w Lipitor 40    GI  #Transaminitis  Likely shock liver i/s/o cardiac arrest. Distension on physical exam  AST/ALT ratio 1:1; serum alcohol negative. unlikely alcohol induced liver injury  - continue to monitor  - trend to peak  - f/u hepatitis panel  - f/u CT Abdomen    #Ileus  likely 2/2 to fentanyl overdose. Found with drug paraphernalia nearby.  CT Abdomen -colon dilated to 8 cm with gas and semi solid/liquid stool. Stomach distended with gas and fluid.   - OG decompression  - rectal tube  - Reglan vs erythromycin vs peripherally acting opoid antagonist if not responding to above therapy    #Chronic constipation  Home medications: Dulcolax and Miralax qd for constipation    #Chronic GERD  Home medication: omeprazole 40 qd    Renal  - f/u urine lytes    Rheum  #RA  Managed on Humira 40mg, MTX 2.5mg, and Naproxen PRN.    - hold Humira, MTX, leukovorin, and Naproxen    Endo  JADE    ID  JADE    Heme  #Chronic MARCELL  Low HGB as of 08/2023, per outside records  Home medication: Ferrous Sulfate    PPX  F: s/p 1L LR  E: Replete  N: NPO  DVT ppx: SCDs  Dispo: MICU     FULL CODE

## 2024-05-19 NOTE — H&P ADULT - NSHPLABSRESULTS_GEN_ALL_CORE
LABS:                         10.6   4.25  )-----------( 304      ( 19 May 2024 21:54 )             35.5         132<L>  |  98  |  14  ----------------------------<  287<H>  4.8   |  17<L>  |  1.06    Ca    8.6      19 May 2024 21:54  Mg     2.4         TPro  5.6<L>  /  Alb  3.3  /  TBili  <0.2  /  DBili  x   /  AST  259<H>  /  ALT  280<H>  /  AlkPhos  79      PT/INR - ( 19 May 2024 21:54 )   PT: 9.8 sec;   INR: 0.86          PTT - ( 19 May 2024 21:54 )  PTT:29.1 sec  Urinalysis Basic - ( 19 May 2024 21:54 )    Color: Yellow / Appearance: Cloudy / S.008 / pH: x  Gluc: 287 mg/dL / Ketone: Negative mg/dL  / Bili: Negative / Urobili: 0.2 mg/dL   Blood: x / Protein: >=1000 mg/dL / Nitrite: Negative   Leuk Esterase: Negative / RBC: 21 /HPF / WBC 14 /HPF   Sq Epi: x / Non Sq Epi: 1 /HPF / Bacteria: Many /HPF            Lactate, Blood: 6.6 mmol/L ( @ 21:54)  Lactate, Blood: 7.2 mmol/L ( @ 21:54)      RADIOLOGY, EKG & ADDITIONAL TESTS: Reviewed

## 2024-05-19 NOTE — CONSULT NOTE ADULT - SUBJECTIVE AND OBJECTIVE BOX
CRITICAL CARE CONSULT NOTE    HPI: 45 y/o F with PMH of HTN, HLD, RA, chronic GERD, hemorrhoids, chronic MARCELL, bipolar 1 dx, anxiety/MDD, bipolar with paula, lupus, and fibromyalgia presenting to [facility] ED after cardiac arrest. Patient was found down at _________ with drug paraphernalia surrounding her. Unclear how long patient was down for; 15 minutes of ACLS was performed in the field w/ ROSC. She arrived in the ED and pulse was lost and again one more round of compressions performed w/ ROSC. ICU consulted for post arrest care.       Subjective/ROS: Patient seen and examined at bedside.     VITALS  Vital Signs Last 24 Hrs  T(C): 36.3 (19 May 2024 22:30), Max: 36.3 (19 May 2024 22:30)  T(F): 97.3 (19 May 2024 22:30), Max: 97.3 (19 May 2024 22:30)  HR: 107 (19 May 2024 22:30) (73 - 107)  BP: 129/60 (19 May 2024 22:30) (76/40 - 129/60)  BP(mean): --  RR: 18 (19 May 2024 22:30) (18 - 18)  SpO2: 99% (19 May 2024 22:30) (91% - 99%)    Parameters below as of 19 May 2024 22:30  Patient On (Oxygen Delivery Method): ventilator        CAPILLARY BLOOD GLUCOSE          PHYSICAL EXAM  General: NAD  Head: NC/AT; MMM; PERRL; EOMI;  Neck: Supple; no JVD  Respiratory: CTAB; no wheezes/rales/rhonchi  Cardiovascular: Regular rhythm/rate; S1/S2+, no murmurs, rubs gallops   Gastrointestinal: Soft; NTND; bowel sounds normal and present  Extremities: WWP; no edema/cyanosis  Neurological: A&Ox3, CNII-XII grossly intact; no obvious focal deficits    MEDICATIONS  (STANDING):  norepinephrine Infusion 0.05 MICROgram(s)/kG/Min (7.5 mL/Hr) IV Continuous <Continuous>  sodium bicarbonate  Infusion 0.281 mEq/kG/Hr (150 mL/Hr) IV Continuous <Continuous>    MEDICATIONS  (PRN):      No Known Allergies      LABS                        10.6   4.25  )-----------( 304      ( 19 May 2024 21:54 )             35.5           PT/INR - ( 19 May 2024 21:54 )   PT: 9.8 sec;   INR: 0.86          PTT - ( 19 May 2024 21:54 )  PTT:29.1 sec            IMAGING/EKG/ETC   CRITICAL CARE CONSULT NOTE    HPI: 43 y/o F with PMH of HTN, HLD, RA, chronic GERD, hemorrhoids, chronic MARCELL, bipolar 1 dx, anxiety/MDD, bipolar with paula, lupus, and fibromyalgia presenting to [facility] ED after cardiac arrest. Patient [facility] at the shelter [facility] with drug paraphernalia surrounding her including crack pipe and needles. Unclear how long patient was down for; 15 minutes of ACLS was performed in the field w/ ROSC. She arrived in the ED and pulse was lost and again one more round of compressions performed w/ ROSC. ICU consulted for post arrest care.       Subjective/ROS: Patient seen and examined at bedside.     VITALS  Vital Signs Last 24 Hrs  T(C): 36.3 (19 May 2024 22:30), Max: 36.3 (19 May 2024 22:30)  T(F): 97.3 (19 May 2024 22:30), Max: 97.3 (19 May 2024 22:30)  HR: 107 (19 May 2024 22:30) (73 - 107)  BP: 129/60 (19 May 2024 22:30) (76/40 - 129/60)  BP(mean): --  RR: 18 (19 May 2024 22:30) (18 - 18)  SpO2: 99% (19 May 2024 22:30) (91% - 99%)    Parameters below as of 19 May 2024 22:30  Patient On (Oxygen Delivery Method): ventilator        CAPILLARY BLOOD GLUCOSE          PHYSICAL EXAM  General: NAD  Head: NC/AT; MMM; PERRL; EOMI;  Neck: Supple; no JVD  Respiratory: CTAB; no wheezes/rales/rhonchi  Cardiovascular: Regular rhythm/rate; S1/S2+, no murmurs, rubs gallops   Gastrointestinal: Soft; NTND; bowel sounds normal and present  Extremities: WWP; no edema/cyanosis  Neurological: A&Ox3, CNII-XII grossly intact; no obvious focal deficits    MEDICATIONS  (STANDING):  norepinephrine Infusion 0.05 MICROgram(s)/kG/Min (7.5 mL/Hr) IV Continuous <Continuous>  sodium bicarbonate  Infusion 0.281 mEq/kG/Hr (150 mL/Hr) IV Continuous <Continuous>    MEDICATIONS  (PRN):      No Known Allergies      LABS                        10.6   4.25  )-----------( 304      ( 19 May 2024 21:54 )             35.5           PT/INR - ( 19 May 2024 21:54 )   PT: 9.8 sec;   INR: 0.86          PTT - ( 19 May 2024 21:54 )  PTT:29.1 sec            IMAGING/EKG/ETC

## 2024-05-19 NOTE — CONSULT NOTE ADULT - ASSESSMENT
43 y/o F with PMH of HTN, HLD, RA, chronic GERD, hemorrhoids, chronic MARCELL, bipolar 1 dx, anxiety/MDD, bipolar with paula, lupus, and fibromyalgia ICU consulted for post cardiac arrest care likely 2/2 drug overdose      #Cardiac Arrest  ACLS x2. ROSC obtained. pH on VBG 6.9 with pattern consistent with respiratory acidosis 45 y/o F with PMH of HTN, HLD, RA, chronic GERD, hemorrhoids, chronic MARCELL, bipolar 1 dx, anxiety/MDD, bipolar with paula, lupus, and fibromyalgia ICU consulted for post cardiac arrest care likely 2/2 drug overdose      #Anoxic brain injury 2/2 to cardiiac arrest  UTox positive for cocaine. Briefly on pressor support. Now weaned.   CT Head - effacement of the cerebral sulci, ventricles and cisternal spaces secondary to cerebral edema. Findings consistent with anoxic brain injury.  - Given anoxic brain injury, aim for permissive hypertension for adequate perfusion in the setting of unknown ICP, aim for MAP >80  - given cerebral edema, would aim for permissive hypocapnia  - goal sodium 150, start hypertonic saline; goal 140 by AM  - q4 BMP  - targeted temperature managment, maintain normothermia  - vEEG  - f/u neuron specific enolase  - GOC and Palliative consult in AM  - Neuro consult    #Anxiety / Depression  #Bipolar 1 Diagnosis  on lithium.   - lithium level 0.26  - hold home med    #Sedated  #Intubated  - on Propofol    Pulm  #Intubation  On ventilator. VBG consistent with respiratory acidosis.  Current settings: FIO2 40; ; RR 24; PEEP 5  Cheye dubois breathing noted  - f/u serial ABGs    #Aspiration  CT chest - bilateral consolidations in lung bases, pending radiology read  - f/u CT Chest read  - started on vanc and zosyn for empiric coverage  - f/u blood cultures    Cards  #Cardiac Arrest  ACLS x2. ROSC obtained. pH on VBG 6.9 with pattern consistent with respiratory acidosis.   UTox positive for cocaine; however no signs of ACS with negative trops and non-ischemic EKG. High suspicion of fentanyl overdose  - f/u fentanyl level  - f/u TTE  - trend lactate   - Pacer pads in place   - follow cerebral edema/neuroprotective protocol outlined above     #HTN  home medications Losartan 100mg, HCTZ 25mg, Norvasc 6.25 BID   - holding home meds    #HLD  home medication Atorvastatin 40mg    - C/w Lipitor 40    GI  #Transaminitis  Likely i/s/o cardiac arrest. distension on physical exam  - continue to monitor  - f/u CT Abdomen    #Increased Gastric contents  - OG decompression    #Chronic constipation  Home medications: Dulcolax and Miralax qd for constipation    #Chronic GERD  Home medication: omeprazole 40 qd    Renal  - f/u urine lytes    Rheum  #RA  Managed on Humira 40mg, MTX 2.5mg, and Naproxen PRN.    - hold Humira, MTX, leukovorin, and Naproxen    Endo  JADE    ID  JADE    Heme  #Chronic MARCELL  Low HGB as of 08/2023, per outside records  Home medication: Ferrous Sulfate    PPX  F: s/p 1L LR  E: Replete  N: NPO  DVT ppx: SCDs  Dispo: MICU     FULL CODE

## 2024-05-19 NOTE — H&P ADULT - NSHPPHYSICALEXAM_GEN_ALL_CORE
T(C): 36.3 (05-19-24 @ 22:30), Max: 36.3 (05-19-24 @ 22:30)  HR: 109 (05-19-24 @ 23:49) (73 - 116)  BP: 172/130 (05-19-24 @ 23:49) (74/45 - 190/123)  RR: 15 (05-19-24 @ 23:49) (15 - 18)  SpO2: 100% (05-19-24 @ 23:49) (91% - 100%)    General: NAD, laying in bed, speaking in full sentences  HEENT: head NC/AT, no conjunctival injection, EOMI, MMM  Neck: supple, no JVD  Cardio: RRR, +S1/S2, no M/R/G  Resp: lungs CTAB, no cough/wheezes/rales/rhonchi  Abdo: soft, NT, ND, +bowel sounds x4  Extremities: WWP, no edema/cyanosis/clubbing   Vasc: 2+ radial and DP pulses b/l  Neuro: A&Ox3  Psych: speech non-pressured, thoughts goal-oriented  Skin: dry, intact, no visible jaundice T(C): 36.3 (05-19-24 @ 22:30), Max: 36.3 (05-19-24 @ 22:30)  HR: 109 (05-19-24 @ 23:49) (73 - 116)  BP: 172/130 (05-19-24 @ 23:49) (74/45 - 190/123)  RR: 15 (05-19-24 @ 23:49) (15 - 18)  SpO2: 100% (05-19-24 @ 23:49) (91% - 100%)    General: Intubated  HEENT: head NC/AT, no conjunctival injection, fixed and dilated pupils  Neck: supple, no JVD  Cardio: Tachycardic,  +S1/S2, no M/R/G  Resp: lungs CTAB, no cough/wheezes/rales/rhonchi  Abdo: distended  Extremities: cool extremities  Vasc: 2+ radial and DP pulses b/l

## 2024-05-19 NOTE — ED PROVIDER NOTE - CLINICAL SUMMARY MEDICAL DECISION MAKING FREE TEXT BOX
On arrival to ED no pulse noted, ACLS initiated and got ROSC after 1 round and 1 epi. Pt subsequently became hypotensive to 60 systolic, started levo drip. POCUS w/ bilateral lung sliding, bedside echo no pericardial effusion, overall good squeeze.     Exam- pt w/ fixed and dilated pupils, not responding to pain, not requiring sedation.  Per outpt chart review, no hx of drug use. Pt had recent outpt visits - 5/17 reported RLQ abd pain, 4/25 for sore throat and SOB.    cardiac arrest likely 2/2 drug use based on EMS report however no known hx of drug use, plan for infectious metabolic workup, CTH/CTA chest/CTAP  levo for hypotension  MICU consult    -->pt acidotic, pH 6.99, ordered sodium bicarb push and drip, pt to be admitted to MICU On arrival to ED no pulse noted, ACLS initiated and got ROSC after 1 round and 1 epi. Pt subsequently became hypotensive to 60 systolic, started levo drip. POCUS w/ bilateral lung sliding, bedside echo no pericardial effusion, overall good squeeze.     Exam- pt w/ fixed and dilated pupils, not responding to pain, not requiring sedation.  Per outpt chart review, no hx of drug use. Pt had recent outpt visits - 5/17 reported RLQ abd pain, 4/25 for sore throat and SOB.    cardiac arrest likely 2/2 drug use based on EMS report however no known hx of drug use, plan for infectious metabolic workup, CTH/CTA chest/CTAP  levo for hypotension  MICU consult  concern for anoxic brain injury    -->pt acidotic, pH 6.99, ordered sodium bicarb push and drip, pt to be admitted to MICU

## 2024-05-19 NOTE — ED PROVIDER NOTE - PHYSICAL EXAMINATION
CONST: ill appearing, unresponsive  HEAD: atraumatic  EYES: fixed and dilated pupils  CARD: pulseless  CHEST: equal chest rise, bilateral BS w/ bagging  ABD: distended  EXT: slightly cool, not mottled, not withdrawing to pain  SKIN: normal color, dry  NEURO: unresponsive not following commands or answering questions or moving extremities

## 2024-05-19 NOTE — ED PROVIDER NOTE - WR INTERPRETATION DATE TIME  1
Patient called to request a refill for oxycodone IR 30 MG, 120 Tab, 30 day supply. [facility] [address] [place] [address] Dr [facility] AT [facility] [phone]
Rx sent for oxycodone 30 mg #120 to be filled 5/12/21 and oxycontin ER 80 mg #60 to be filled 5/21/21
19-May-2024 23:13

## 2024-05-20 NOTE — CONSULT NOTE ADULT - PROBLEM SELECTOR RECOMMENDATION 5
.  Patient is Full Code  -daughter is surrogate decision maker in the absence of any known HCP  -children would be acting collectively but one son is unreachable and one daughter is estranged  -ongoing rapport building for further advance care planning

## 2024-05-20 NOTE — CONSULT NOTE ADULT - ASSESSMENT
FULL NOTE TO FOLLOW    ·	introduced the role of Palliative Medicine for symptom management, advance care planning, and emotional support  ·	provided medical updates and support to patient's daughter, she is aware of poor prognosis  ·	will follow up regarding code status as daughter needs time to process but she recognizes that if patient is not improving then this may not be adequate quality of life for the patient  ·	recommend Fentanyl 50mcg IV q2h PRN for Severe Pain or Vent Dyssynchrony 43yo F with PMH of HTN, HLD, RA, MARCELL, Anxiety/MDD, Bipolar, Lupus, and Fibromyalgia p/w cardiac arrest in the setting of likely overdose. Palliative consulted for complex medical decision making in the setting of critical illness.    ·	introduced the role of Palliative Medicine for symptom management, advance care planning, and emotional support  ·	provided medical updates and support to patient's daughter, she is aware of poor prognosis  ·	will follow up regarding code status as daughter needs time to process but she recognizes that if patient is not improving then this may not be adequate quality of life for the patient  ·	recommend Fentanyl 50mcg IV q2h PRN for Severe Pain or Vent Dyssynchrony

## 2024-05-20 NOTE — PROGRESS NOTE ADULT - SUBJECTIVE AND OBJECTIVE BOX
** INCOMPLETE **     INTERVAL HPI/OVERNIGHT EVENTS:    SUBJECTIVE: Patient seen and examined at bedside.    VITAL SIGNS:  ICU Vital Signs Last 24 Hrs  T(C): 38.5 (20 May 2024 05:09), Max: 38.5 (20 May 2024 05:09)  T(F): 101.3 (20 May 2024 05:09), Max: 101.3 (20 May 2024 05:09)  HR: 112 (20 May 2024 07:00) (73 - 116)  BP: 151/74 (20 May 2024 07:00) (74/45 - 210/96)  BP(mean): 101 (20 May 2024 07:00) (55 - 151)  ABP: --  ABP(mean): --  RR: 15 (20 May 2024 07:00) (11 - 26)  SpO2: 98% (20 May 2024 07:00) (91% - 100%)    O2 Parameters below as of 20 May 2024 07:00  Patient On (Oxygen Delivery Method): ventilator    O2 Concentration (%): 30      Mode: AC/ CMV (Assist Control/ Continuous Mandatory Ventilation), RR (machine): 18, TV (machine): 360, FiO2: 30, PEEP: 5, ITime: 1, MAP: 10, PIP: 26    05-19 @ 07:01  -  05-20 @ 07:00  --------------------------------------------------------  IN: 1727 mL / OUT: 1310 mL / NET: 417 mL      CAPILLARY BLOOD GLUCOSE          PHYSICAL EXAM:  Constitutional: NAD  HEENT: NC/AT; PERRL, anicteric sclera; MMM  Neck: supple, no JVD  Cardiovascular: +S1/S2, RRR  Respiratory: CTA B/L, no W/R/R  Gastrointestinal: abdomen soft, NT/ND; no rebound or guarding; +BSx4  Genitourinary: no suprapubic tenderness or fullness  Extremities: WWP; no LE edema; no clubbing or cyanosis  Vascular: 2+ radial, DP/PT and femoral pulses B/L  Dermatologic: normal color and turgor; no visible rashes  Neurological: AO     MEDICATIONS:  MEDICATIONS  (STANDING):  acetaminophen   IVPB .. 1000 milliGRAM(s) IV Intermittent once  acetaminophen   IVPB .. 1000 milliGRAM(s) IV Intermittent once  chlorhexidine 0.12% Liquid 15 milliLiter(s) Oral Mucosa every 12 hours  chlorhexidine 2% Cloths 1 Application(s) Topical <User Schedule>  pantoprazole  Injectable 40 milliGRAM(s) IV Push daily  piperacillin/tazobactam IVPB.. 3.375 Gram(s) IV Intermittent every 8 hours  propofol Infusion 5 MICROgram(s)/kG/Min (2.84 mL/Hr) IV Continuous <Continuous>  sodium chloride 3%. 500 milliLiter(s) (20 mL/Hr) IV Continuous <Continuous>  sodium phosphate 30 milliMole(s)/500 mL IVPB 30 milliMole(s) IV Intermittent once    MEDICATIONS  (PRN):      LABS:                        15.6   17.25 )-----------( 368      ( 20 May 2024 05:16 )             50.7     05-20    138  |  101  |  18  ----------------------------<  169<H>  4.1   |  20<L>  |  0.85    Ca    8.6      20 May 2024 05:16  Phos  2.4     05-20  Mg     2.0     05-20    TPro  7.5  /  Alb  4.1  /  TBili  0.2  /  DBili  x   /  AST  583<H>  /  ALT  446<H>  /  AlkPhos  99  05-20    PT/INR - ( 19 May 2024 21:54 )   PT: 9.8 sec;   INR: 0.86          PTT - ( 19 May 2024 21:54 )  PTT:29.1 sec  Urinalysis Basic - ( 20 May 2024 05:16 )    Color: x / Appearance: x / SG: x / pH: x  Gluc: 169 mg/dL / Ketone: x  / Bili: x / Urobili: x   Blood: x / Protein: x / Nitrite: x   Leuk Esterase: x / RBC: x / WBC x   Sq Epi: x / Non Sq Epi: x / Bacteria: x        RADIOLOGY & ADDITIONAL TESTS: Reviewed. INTERVAL HPI/OVERNIGHT EVENTS: Started on hypertonic saline, empiric vanc/zosyn. 30 phos repleted    SUBJECTIVE: Patient seen and examined at bedside. Limited secondary to mental status.    VITAL SIGNS:  ICU Vital Signs Last 24 Hrs  T(C): 38.5 (20 May 2024 05:09), Max: 38.5 (20 May 2024 05:09)  T(F): 101.3 (20 May 2024 05:09), Max: 101.3 (20 May 2024 05:09)  HR: 112 (20 May 2024 07:00) (73 - 116)  BP: 151/74 (20 May 2024 07:00) (74/45 - 210/96)  BP(mean): 101 (20 May 2024 07:00) (55 - 151)  ABP: --  ABP(mean): --  RR: 15 (20 May 2024 07:00) (11 - 26)  SpO2: 98% (20 May 2024 07:00) (91% - 100%)    O2 Parameters below as of 20 May 2024 07:00  Patient On (Oxygen Delivery Method): ventilator    O2 Concentration (%): 30    Mode: AC/ CMV (Assist Control/ Continuous Mandatory Ventilation), RR (machine): 18, TV (machine): 360, FiO2: 30, PEEP: 5, ITime: 1, MAP: 10, PIP: 26    05-19 @ 07:01  -  05-20 @ 07:00  --------------------------------------------------------  IN: 1727 mL / OUT: 1310 mL / NET: 417 mL    CAPILLARY BLOOD GLUCOSE    PHYSICAL EXAM:  Constitutional: intubated and sedated  HEENT: pupils 7mm OU, fixed without deviation, non reactive, no corneal reflex or   Neck: negative reaction to trapezius twist  Cardiovascular: +S1/S2, RRR  Respiratory: CTA B/L, no W/R/R  Gastrointestinal: abdomen soft, NT/ND; no rebound or guarding; +BSx4  Genitourinary: no suprapubic tenderness or fullness  Extremities: WWP; no LE edema; no clubbing or cyanosis  Vascular: 2+ radial, DP/PT and femoral pulses B/L  Dermatologic: normal color and turgor; no visible rashes  Neurological: AO     MEDICATIONS:  MEDICATIONS  (STANDING):  acetaminophen   IVPB .. 1000 milliGRAM(s) IV Intermittent once  acetaminophen   IVPB .. 1000 milliGRAM(s) IV Intermittent once  chlorhexidine 0.12% Liquid 15 milliLiter(s) Oral Mucosa every 12 hours  chlorhexidine 2% Cloths 1 Application(s) Topical <User Schedule>  pantoprazole  Injectable 40 milliGRAM(s) IV Push daily  piperacillin/tazobactam IVPB.. 3.375 Gram(s) IV Intermittent every 8 hours  propofol Infusion 5 MICROgram(s)/kG/Min (2.84 mL/Hr) IV Continuous <Continuous>  sodium chloride 3%. 500 milliLiter(s) (20 mL/Hr) IV Continuous <Continuous>  sodium phosphate 30 milliMole(s)/500 mL IVPB 30 milliMole(s) IV Intermittent once    MEDICATIONS  (PRN):      LABS:                        15.6   17.25 )-----------( 368      ( 20 May 2024 05:16 )             50.7     05-20    138  |  101  |  18  ----------------------------<  169<H>  4.1   |  20<L>  |  0.85    Ca    8.6      20 May 2024 05:16  Phos  2.4     05-20  Mg     2.0     05-20    TPro  7.5  /  Alb  4.1  /  TBili  0.2  /  DBili  x   /  AST  583<H>  /  ALT  446<H>  /  AlkPhos  99  05-20    PT/INR - ( 19 May 2024 21:54 )   PT: 9.8 sec;   INR: 0.86          PTT - ( 19 May 2024 21:54 )  PTT:29.1 sec  Urinalysis Basic - ( 20 May 2024 05:16 )    Color: x / Appearance: x / SG: x / pH: x  Gluc: 169 mg/dL / Ketone: x  / Bili: x / Urobili: x   Blood: x / Protein: x / Nitrite: x   Leuk Esterase: x / RBC: x / WBC x   Sq Epi: x / Non Sq Epi: x / Bacteria: x        RADIOLOGY & ADDITIONAL TESTS: Reviewed. INTERVAL HPI/OVERNIGHT EVENTS: Started on hypertonic saline, empiric vanc/zosyn. 30 phos repleted    SUBJECTIVE: Patient seen and examined at bedside. Limited secondary to mental status.    VITAL SIGNS:  ICU Vital Signs Last 24 Hrs  T(C): 38.5 (20 May 2024 05:09), Max: 38.5 (20 May 2024 05:09)  T(F): 101.3 (20 May 2024 05:09), Max: 101.3 (20 May 2024 05:09)  HR: 112 (20 May 2024 07:00) (73 - 116)  BP: 151/74 (20 May 2024 07:00) (74/45 - 210/96)  BP(mean): 101 (20 May 2024 07:00) (55 - 151)  ABP: --  ABP(mean): --  RR: 15 (20 May 2024 07:00) (11 - 26)  SpO2: 98% (20 May 2024 07:00) (91% - 100%)    O2 Parameters below as of 20 May 2024 07:00  Patient On (Oxygen Delivery Method): ventilator    O2 Concentration (%): 30    Mode: AC/ CMV (Assist Control/ Continuous Mandatory Ventilation), RR (machine): 18, TV (machine): 360, FiO2: 30, PEEP: 5, ITime: 1, MAP: 10, PIP: 26    05-19 @ 07:01  -  05-20 @ 07:00  --------------------------------------------------------  IN: 1727 mL / OUT: 1310 mL / NET: 417 mL    CAPILLARY BLOOD GLUCOSE    PHYSICAL EXAM:  Constitutional: intubated and sedated  HEENT: pupils 7mm OU, fixed without deviation, non reactive, no corneal reflex or   Neck: no reaction to trapezius squeeze  Cardiovascular: +S1/S2, RRR  Respiratory: bilateral rhonchi  Gastrointestinal: abdomen soft, NT/ND; no rebound or guarding; +BSx4  Genitourinary: no suprapubic tenderness or fullness  Extremities: WWP; no LE edema; no clubbing or cyanosis  Vascular: 2+ radial, DP/PT and femoral pulses B/L  Dermatologic: normal color and turgor; no visible rashes  Neurological: AO     MEDICATIONS:  MEDICATIONS  (STANDING):  acetaminophen   IVPB .. 1000 milliGRAM(s) IV Intermittent once  acetaminophen   IVPB .. 1000 milliGRAM(s) IV Intermittent once  chlorhexidine 0.12% Liquid 15 milliLiter(s) Oral Mucosa every 12 hours  chlorhexidine 2% Cloths 1 Application(s) Topical <User Schedule>  pantoprazole  Injectable 40 milliGRAM(s) IV Push daily  piperacillin/tazobactam IVPB.. 3.375 Gram(s) IV Intermittent every 8 hours  propofol Infusion 5 MICROgram(s)/kG/Min (2.84 mL/Hr) IV Continuous <Continuous>  sodium chloride 3%. 500 milliLiter(s) (20 mL/Hr) IV Continuous <Continuous>  sodium phosphate 30 milliMole(s)/500 mL IVPB 30 milliMole(s) IV Intermittent once    MEDICATIONS  (PRN):      LABS:                        15.6   17.25 )-----------( 368      ( 20 May 2024 05:16 )             50.7     05-20    138  |  101  |  18  ----------------------------<  169<H>  4.1   |  20<L>  |  0.85    Ca    8.6      20 May 2024 05:16  Phos  2.4     05-20  Mg     2.0     05-20    TPro  7.5  /  Alb  4.1  /  TBili  0.2  /  DBili  x   /  AST  583<H>  /  ALT  446<H>  /  AlkPhos  99  05-20    PT/INR - ( 19 May 2024 21:54 )   PT: 9.8 sec;   INR: 0.86          PTT - ( 19 May 2024 21:54 )  PTT:29.1 sec  Urinalysis Basic - ( 20 May 2024 05:16 )    Color: x / Appearance: x / SG: x / pH: x  Gluc: 169 mg/dL / Ketone: x  / Bili: x / Urobili: x   Blood: x / Protein: x / Nitrite: x   Leuk Esterase: x / RBC: x / WBC x   Sq Epi: x / Non Sq Epi: x / Bacteria: x        RADIOLOGY & ADDITIONAL TESTS: Reviewed. INTERVAL HPI/OVERNIGHT EVENTS: Started on hypertonic saline, empiric vanc/zosyn. 30 phos repleted    SUBJECTIVE: Patient seen and examined at bedside. Limited secondary to mental status.    VITAL SIGNS:  ICU Vital Signs Last 24 Hrs  T(C): 38.5 (20 May 2024 05:09), Max: 38.5 (20 May 2024 05:09)  T(F): 101.3 (20 May 2024 05:09), Max: 101.3 (20 May 2024 05:09)  HR: 112 (20 May 2024 07:00) (73 - 116)  BP: 151/74 (20 May 2024 07:00) (74/45 - 210/96)  BP(mean): 101 (20 May 2024 07:00) (55 - 151)  ABP: --  ABP(mean): --  RR: 15 (20 May 2024 07:00) (11 - 26)  SpO2: 98% (20 May 2024 07:00) (91% - 100%)    O2 Parameters below as of 20 May 2024 07:00  Patient On (Oxygen Delivery Method): ventilator    O2 Concentration (%): 30    Mode: AC/ CMV (Assist Control/ Continuous Mandatory Ventilation), RR (machine): 18, TV (machine): 360, FiO2: 30, PEEP: 5, ITime: 1, MAP: 10, PIP: 26    05-19 @ 07:01  -  05-20 @ 07:00  --------------------------------------------------------  IN: 1727 mL / OUT: 1310 mL / NET: 417 mL    CAPILLARY BLOOD GLUCOSE    PHYSICAL EXAM:  Constitutional: intubated and sedated  HEENT: pupils 7mm OU, fixed without deviation, non reactive, no corneal reflex or   Neck: no reaction to trapezius squeeze  Cardiovascular: +S1/S2, RRR  Respiratory: bilateral rhonchi  Gastrointestinal: abdomen distended  Genitourinary: no suprapubic tenderness or fullness  Extremities: WWP; no LE edema; no clubbing or cyanosis  Vascular: 2+ radial, DP/PT and femoral pulses B/L  Dermatologic: normal color and turgor; no visible rashes  Neurological: AO     MEDICATIONS:  MEDICATIONS  (STANDING):  acetaminophen   IVPB .. 1000 milliGRAM(s) IV Intermittent once  acetaminophen   IVPB .. 1000 milliGRAM(s) IV Intermittent once  chlorhexidine 0.12% Liquid 15 milliLiter(s) Oral Mucosa every 12 hours  chlorhexidine 2% Cloths 1 Application(s) Topical <User Schedule>  pantoprazole  Injectable 40 milliGRAM(s) IV Push daily  piperacillin/tazobactam IVPB.. 3.375 Gram(s) IV Intermittent every 8 hours  propofol Infusion 5 MICROgram(s)/kG/Min (2.84 mL/Hr) IV Continuous <Continuous>  sodium chloride 3%. 500 milliLiter(s) (20 mL/Hr) IV Continuous <Continuous>  sodium phosphate 30 milliMole(s)/500 mL IVPB 30 milliMole(s) IV Intermittent once    MEDICATIONS  (PRN):      LABS:                        15.6   17.25 )-----------( 368      ( 20 May 2024 05:16 )             50.7     05-20    138  |  101  |  18  ----------------------------<  169<H>  4.1   |  20<L>  |  0.85    Ca    8.6      20 May 2024 05:16  Phos  2.4     05-20  Mg     2.0     05-20    TPro  7.5  /  Alb  4.1  /  TBili  0.2  /  DBili  x   /  AST  583<H>  /  ALT  446<H>  /  AlkPhos  99  05-20    PT/INR - ( 19 May 2024 21:54 )   PT: 9.8 sec;   INR: 0.86          PTT - ( 19 May 2024 21:54 )  PTT:29.1 sec  Urinalysis Basic - ( 20 May 2024 05:16 )    Color: x / Appearance: x / SG: x / pH: x  Gluc: 169 mg/dL / Ketone: x  / Bili: x / Urobili: x   Blood: x / Protein: x / Nitrite: x   Leuk Esterase: x / RBC: x / WBC x   Sq Epi: x / Non Sq Epi: x / Bacteria: x        RADIOLOGY & ADDITIONAL TESTS: Reviewed. INTERVAL HPI/OVERNIGHT EVENTS: Started on hypertonic saline, empiric vanc/zosyn. 30 phos repleted    SUBJECTIVE: Patient seen and examined at bedside. Limited secondary to mental status.    VITAL SIGNS:  ICU Vital Signs Last 24 Hrs  T(C): 38.5 (20 May 2024 05:09), Max: 38.5 (20 May 2024 05:09)  T(F): 101.3 (20 May 2024 05:09), Max: 101.3 (20 May 2024 05:09)  HR: 112 (20 May 2024 07:00) (73 - 116)  BP: 151/74 (20 May 2024 07:00) (74/45 - 210/96)  BP(mean): 101 (20 May 2024 07:00) (55 - 151)  ABP: --  ABP(mean): --  RR: 15 (20 May 2024 07:00) (11 - 26)  SpO2: 98% (20 May 2024 07:00) (91% - 100%)    O2 Parameters below as of 20 May 2024 07:00  Patient On (Oxygen Delivery Method): ventilator    O2 Concentration (%): 30    Mode: AC/ CMV (Assist Control/ Continuous Mandatory Ventilation), RR (machine): 18, TV (machine): 360, FiO2: 30, PEEP: 5, ITime: 1, MAP: 10, PIP: 26    05-19 @ 07:01  -  05-20 @ 07:00  --------------------------------------------------------  IN: 1727 mL / OUT: 1310 mL / NET: 417 mL    CAPILLARY BLOOD GLUCOSE    PHYSICAL EXAM:  Constitutional: intubated and sedated  HEENT: pupils 7mm OU, fixed without deviation, non reactive, no corneal reflex or   Neck: no reaction to trapezius squeeze  Cardiovascular: +S1/S2, RRR  Respiratory: bilateral rhonchi  Gastrointestinal: abdomen distended  Extremities: moderately cool and moist extremities  Dermatologic: no rashes    MEDICATIONS:  MEDICATIONS  (STANDING):  acetaminophen   IVPB .. 1000 milliGRAM(s) IV Intermittent once  acetaminophen   IVPB .. 1000 milliGRAM(s) IV Intermittent once  chlorhexidine 0.12% Liquid 15 milliLiter(s) Oral Mucosa every 12 hours  chlorhexidine 2% Cloths 1 Application(s) Topical <User Schedule>  pantoprazole  Injectable 40 milliGRAM(s) IV Push daily  piperacillin/tazobactam IVPB.. 3.375 Gram(s) IV Intermittent every 8 hours  propofol Infusion 5 MICROgram(s)/kG/Min (2.84 mL/Hr) IV Continuous <Continuous>  sodium chloride 3%. 500 milliLiter(s) (20 mL/Hr) IV Continuous <Continuous>  sodium phosphate 30 milliMole(s)/500 mL IVPB 30 milliMole(s) IV Intermittent once    MEDICATIONS  (PRN):      LABS:                        15.6   17.25 )-----------( 368      ( 20 May 2024 05:16 )             50.7     05-20    138  |  101  |  18  ----------------------------<  169<H>  4.1   |  20<L>  |  0.85    Ca    8.6      20 May 2024 05:16  Phos  2.4     05-20  Mg     2.0     05-20    TPro  7.5  /  Alb  4.1  /  TBili  0.2  /  DBili  x   /  AST  583<H>  /  ALT  446<H>  /  AlkPhos  99  05-20    PT/INR - ( 19 May 2024 21:54 )   PT: 9.8 sec;   INR: 0.86          PTT - ( 19 May 2024 21:54 )  PTT:29.1 sec  Urinalysis Basic - ( 20 May 2024 05:16 )    Color: x / Appearance: x / SG: x / pH: x  Gluc: 169 mg/dL / Ketone: x  / Bili: x / Urobili: x   Blood: x / Protein: x / Nitrite: x   Leuk Esterase: x / RBC: x / WBC x   Sq Epi: x / Non Sq Epi: x / Bacteria: x        RADIOLOGY & ADDITIONAL TESTS: Reviewed. INTERVAL HPI/OVERNIGHT EVENTS: Started on hypertonic saline, empiric vanc/zosyn. 30 phos repleted    SUBJECTIVE: Patient seen and examined at bedside. Limited secondary to mental status.    VITAL SIGNS:  ICU Vital Signs Last 24 Hrs  T(C): 38.5 (20 May 2024 05:09), Max: 38.5 (20 May 2024 05:09)  T(F): 101.3 (20 May 2024 05:09), Max: 101.3 (20 May 2024 05:09)  HR: 112 (20 May 2024 07:00) (73 - 116)  BP: 151/74 (20 May 2024 07:00) (74/45 - 210/96)  BP(mean): 101 (20 May 2024 07:00) (55 - 151)  ABP: --  ABP(mean): --  RR: 15 (20 May 2024 07:00) (11 - 26)  SpO2: 98% (20 May 2024 07:00) (91% - 100%)    O2 Parameters below as of 20 May 2024 07:00  Patient On (Oxygen Delivery Method): ventilator    O2 Concentration (%): 30    Mode: AC/ CMV (Assist Control/ Continuous Mandatory Ventilation), RR (machine): 18, TV (machine): 360, FiO2: 30, PEEP: 5, ITime: 1, MAP: 10, PIP: 26    05-19 @ 07:01  -  05-20 @ 07:00  --------------------------------------------------------  IN: 1727 mL / OUT: 1310 mL / NET: 417 mL    CAPILLARY BLOOD GLUCOSE    PHYSICAL EXAM:  Constitutional: intubated and sedated  HEENT: pupils 7mm OU, fixed without deviation, non reactive, no corneal reflex or   Neck: no reaction to trapezius squeeze  Cardiovascular: +S1/S2, RRR  Respiratory: bilateral rhonchi  Gastrointestinal: abdomen distended  Extremities: moderately cool and moist extremities  Dermatologic: no rashes    MEDICATIONS:  MEDICATIONS  (STANDING):  acetaminophen   IVPB .. 1000 milliGRAM(s) IV Intermittent once  acetaminophen   IVPB .. 1000 milliGRAM(s) IV Intermittent once  chlorhexidine 0.12% Liquid 15 milliLiter(s) Oral Mucosa every 12 hours  chlorhexidine 2% Cloths 1 Application(s) Topical <User Schedule>  pantoprazole  Injectable 40 milliGRAM(s) IV Push daily  piperacillin/tazobactam IVPB.. 3.375 Gram(s) IV Intermittent every 8 hours  propofol Infusion 5 MICROgram(s)/kG/Min (2.84 mL/Hr) IV Continuous <Continuous>  sodium chloride 3%. 500 milliLiter(s) (20 mL/Hr) IV Continuous <Continuous>  sodium phosphate 30 milliMole(s)/500 mL IVPB 30 milliMole(s) IV Intermittent once    MEDICATIONS  (PRN):      LABS:                        15.6   17.25 )-----------( 368      ( 20 May 2024 05:16 )             50.7     05-20    138  |  101  |  18  ----------------------------<  169<H>  4.1   |  20<L>  |  0.85    Ca    8.6      20 May 2024 05:16  Phos  2.4     05-20  Mg     2.0     05-20    TPro  7.5  /  Alb  4.1  /  TBili  0.2  /  DBili  x   /  AST  583<H>  /  ALT  446<H>  /  AlkPhos  99  05-20    PT/INR - ( 19 May 2024 21:54 )   PT: 9.8 sec;   INR: 0.86       PTT - ( 19 May 2024 21:54 )  PTT:29.1 sec  Urinalysis Basic - ( 20 May 2024 05:16 )    Color: x / Appearance: x / SG: x / pH: x  Gluc: 169 mg/dL / Ketone: x  / Bili: x / Urobili: x   Blood: x / Protein: x / Nitrite: x   Leuk Esterase: x / RBC: x / WBC x   Sq Epi: x / Non Sq Epi: x / Bacteria: x    RADIOLOGY & ADDITIONAL TESTS: Reviewed. INTERVAL HPI/OVERNIGHT EVENTS: Started on hypertonic saline, empiric vanc/zosyn. 30 phos repleted    SUBJECTIVE: Patient seen and examined at bedside. Limited secondary to mental status.    VITAL SIGNS:  ICU Vital Signs Last 24 Hrs  T(C): 38.5 (20 May 2024 05:09), Max: 38.5 (20 May 2024 05:09)  T(F): 101.3 (20 May 2024 05:09), Max: 101.3 (20 May 2024 05:09)  HR: 112 (20 May 2024 07:00) (73 - 116)  BP: 151/74 (20 May 2024 07:00) (74/45 - 210/96)  BP(mean): 101 (20 May 2024 07:00) (55 - 151)  ABP: --  ABP(mean): --  RR: 15 (20 May 2024 07:00) (11 - 26)  SpO2: 98% (20 May 2024 07:00) (91% - 100%)    O2 Parameters below as of 20 May 2024 07:00  Patient On (Oxygen Delivery Method): ventilator    O2 Concentration (%): 30    Mode: AC/ CMV (Assist Control/ Continuous Mandatory Ventilation), RR (machine): 18, TV (machine): 360, FiO2: 30, PEEP: 5, ITime: 1, MAP: 10, PIP: 26    05-19 @ 07:01  -  05-20 @ 07:00  --------------------------------------------------------  IN: 1727 mL / OUT: 1310 mL / NET: 417 mL    CAPILLARY BLOOD GLUCOSE    PHYSICAL EXAM:  Constitutional: intubated and sedated  HEENT: pupils 7mm OU, fixed without deviation, non reactive, no corneal reflex  Neck: no reaction to trapezius squeeze  Cardiovascular: +S1/S2, RRR  Respiratory: bilateral rhonchi  Gastrointestinal: abdomen distended  Extremities: moderately cool and moist extremities  Dermatologic: no rashes    MEDICATIONS:  MEDICATIONS  (STANDING):  acetaminophen   IVPB .. 1000 milliGRAM(s) IV Intermittent once  acetaminophen   IVPB .. 1000 milliGRAM(s) IV Intermittent once  chlorhexidine 0.12% Liquid 15 milliLiter(s) Oral Mucosa every 12 hours  chlorhexidine 2% Cloths 1 Application(s) Topical <User Schedule>  pantoprazole  Injectable 40 milliGRAM(s) IV Push daily  piperacillin/tazobactam IVPB.. 3.375 Gram(s) IV Intermittent every 8 hours  propofol Infusion 5 MICROgram(s)/kG/Min (2.84 mL/Hr) IV Continuous <Continuous>  sodium chloride 3%. 500 milliLiter(s) (20 mL/Hr) IV Continuous <Continuous>  sodium phosphate 30 milliMole(s)/500 mL IVPB 30 milliMole(s) IV Intermittent once    MEDICATIONS  (PRN):    LABS:                        15.6   17.25 )-----------( 368      ( 20 May 2024 05:16 )             50.7     05-20    138  |  101  |  18  ----------------------------<  169<H>  4.1   |  20<L>  |  0.85    Ca    8.6      20 May 2024 05:16  Phos  2.4     05-20  Mg     2.0     05-20    TPro  7.5  /  Alb  4.1  /  TBili  0.2  /  DBili  x   /  AST  583<H>  /  ALT  446<H>  /  AlkPhos  99  05-20    PT/INR - ( 19 May 2024 21:54 )   PT: 9.8 sec;   INR: 0.86       PTT - ( 19 May 2024 21:54 )  PTT:29.1 sec  Urinalysis Basic - ( 20 May 2024 05:16 )    Color: x / Appearance: x / SG: x / pH: x  Gluc: 169 mg/dL / Ketone: x  / Bili: x / Urobili: x   Blood: x / Protein: x / Nitrite: x   Leuk Esterase: x / RBC: x / WBC x   Sq Epi: x / Non Sq Epi: x / Bacteria: x    RADIOLOGY & ADDITIONAL TESTS: Reviewed.

## 2024-05-20 NOTE — PROCEDURE NOTE - NSICDXPROCEDURE_GEN_ALL_CORE_FT
PROCEDURES:  US guided vascular access 20-May-2024 16:54:41  Sen Bliss  Phlebotomy 20-May-2024 16:54:46  Sen Bliss

## 2024-05-20 NOTE — CONSULT NOTE ADULT - ASSESSMENT
44F hx of substance use was found down in the s/o OD s/p narcan without any effect, patient was found pulseless, ACLS activated with presumed 15 min down time w/ ROSC achieved s/p x2 epi, intubated in the field, in ED with pulseless, ACLS activated with 2 min downtime, admitted to MICU, course c/b CTH with diffuse sulci effacement and mass effect on ventricles likely in the s/p anoxic brain injury, NSICU consulted for management of cerebral edema.    Exam: off sedation, pupils 5mm NR, PHI, no cough/gag, no dolls, +overbreathing, uppers/lowers 0/5 to nox    Recommendations:    - recommended STAT HTS 23.4% bolus followed by 1g/kg mannitol, post HTS without any improvement in examination  - HOB45 degrees, midline to maintain appropriate venous outflow  - follow serum osms post mannitol, can give additional bolus q6h if Osm<320 or gap<20  - start HTS 3%@30cc/h with Na goal 145-150 in the next 24 hours, then 150-160 thereafter. BMPq6h while on hypertonics  - monitor urine output, trend renal function, replete 75% of UOP with NS bolus post mannitol  - GCS<8 in the s/o global cerebral edema likly 2/2 anoxic brain injury, with no response to hyperosmolar therapy, would likely limit the necessity of ICP monitoring, however, recommend NSG consult  - Repeat CTH, MRI brain wwo when able  - Patient at risk of rapid decompensation and progression to brain death, neuroprognostication can be assessed 72 hours post-arrest. Would correct any electrolyte abnormalities, maintain core temp 36C (96.8) or greater, arterial PaCo2 40 +/- 5, pO2>90, SBP>100.  - Avoid sedation as tolerated to maintain clearance of sedatives to at least 5 1/2 lives

## 2024-05-20 NOTE — CONSULT NOTE ADULT - SUBJECTIVE AND OBJECTIVE BOX
HISTORY OF PRESENT ILLNESS: 45 y/o F with PMH of HTN, HLD, RA, chronic GERD, hemorrhoids, chronic MARCELL, bipolar 1 dx, anxiety/MDD, bipolar with paula, lupus, and fibromyalgia, presents for cardiac arrest likely 2/2 drug overdose after being found down at shelter for unknown period of time with drug paraphernalia (needles, crack pipe). ROSC achieved in the field and patient was intubated. On arrival, afebrile, WBC 4.25, BP 76/40, lactate 7.2, elevated LFTs. Patient underwent cardiac arrest in ED w/ ROSC. CTH shows global anoxic injury. Admitted to MICU for further management.     PAST MEDICAL & SURGICAL HISTORY:    FAMILY HISTORY:      SOCIAL HISTORY:  Tobacco Use:  EtOH use:   Substance:    Allergies    No Known Allergies    Intolerances        REVIEW OF SYSTEMS      General:	no recent illnesses, no recent wt gain/loss    Skin/Breast:  intact  	  Ophthalmologic:  negative, glasses for ***  	  ENMT:	negative    Respiratory and Thorax: no coughing, wheezing, recent URI  	  Cardiovascular: no chest pain, DAVIS    Gastrointestinal:	soft, non tender    Genitourinary: no frequency, dysuria    Musculoskeletal:	negative    Neurological:	see HPI    Psychiatric:	negative    Hematology/Lymphatics:	negative    Endocrine:  	negative    Allergic/Immunologic:  Negative      MEDICATIONS:  Antibiotics:  piperacillin/tazobactam IVPB.. 3.375 Gram(s) IV Intermittent every 8 hours  rifAXIMin 550 milliGRAM(s) Oral two times a day  vancomycin  IVPB 1500 milliGRAM(s) IV Intermittent every 12 hours    Neuro:  acetaminophen   IVPB .. 1000 milliGRAM(s) IV Intermittent once    Anticoagulation:  enoxaparin Injectable 40 milliGRAM(s) SubCutaneous every 24 hours    OTHER:  chlorhexidine 0.12% Liquid 15 milliLiter(s) Oral Mucosa every 12 hours  chlorhexidine 2% Cloths 1 Application(s) Topical <User Schedule>  lactulose Syrup 20 Gram(s) Oral every 6 hours  pantoprazole  Injectable 40 milliGRAM(s) IV Push daily    IVF:  sodium chloride 3%. 500 milliLiter(s) IV Continuous <Continuous>      Vital Signs Last 24 Hrs  T(C): 40.6 (20 May 2024 11:00), Max: 40.6 (20 May 2024 11:00)  T(F): 105 (20 May 2024 11:00), Max: 105 (20 May 2024 11:00)  HR: 111 (20 May 2024 12:00) (73 - 121)  BP: 120/58 (20 May 2024 12:00) (74/45 - 210/96)  BP(mean): 84 (20 May 2024 12:00) (55 - 151)  RR: 19 (20 May 2024 12:00) (11 - 26)  SpO2: 100% (20 May 2024 12:00) (91% - 100%)    Parameters below as of 20 May 2024 12:00  Patient On (Oxygen Delivery Method): ventilator    O2 Concentration (%): 30      PHYSICAL EXAM:  Constitutional: Intubated, off sedation   Respiratory: +intubated (overbreathing vent). non-labored breathing. Normal chest rise.   Cardiovascular: Regular rate and rhythm  Gastrointestinal:  Soft, nontender, nondistended.  .  Vascular: warm and well perfused   Neurological: Gen: Not OE to deep noxiouus, Not following commands, AAOx0, pupils 6mm nonreactive b/l, no cough/gag/corneals.     Motor: 0/5 in all four extremities.     LABS:                        14.8   17.03 )-----------( 412      ( 20 May 2024 06:22 )             47.8     05-20    138  |  106  |  28<H>  ----------------------------<  159<H>  3.9   |  17<L>  |  1.11    Ca    7.9<L>      20 May 2024 08:55  Phos  2.4     05-20  Mg     2.0     05-20    TPro  7.5  /  Alb  4.1  /  TBili  0.2  /  DBili  x   /  AST  583<H>  /  ALT  446<H>  /  AlkPhos  99  05-20    PT/INR - ( 19 May 2024 21:54 )   PT: 9.8 sec;   INR: 0.86          PTT - ( 19 May 2024 21:54 )  PTT:29.1 sec  Urinalysis Basic - ( 20 May 2024 08:55 )    Color: x / Appearance: x / SG: x / pH: x  Gluc: 159 mg/dL / Ketone: x  / Bili: x / Urobili: x   Blood: x / Protein: x / Nitrite: x   Leuk Esterase: x / RBC: x / WBC x   Sq Epi: x / Non Sq Epi: x / Bacteria: x      CULTURES:      RADIOLOGY & ADDITIONAL STUDIES:    Assessment:  45 y/o F with PMH of HTN, HLD, RA, chronic GERD, hemorrhoids, chronic MARCELL, bipolar 1 dx, anxiety/MDD, bipolar with paula, lupus, and fibromyalgia, presents for cardiac arrest likely 2/2 drug overdose after being found down at shelter for unknown period of time with drug paraphernalia (needles, crack pipe). ROSC achieved in the field and patient was intubated. On arrival, afebrile, WBC 4.25, BP 76/40, lactate 7.2, elevated LFTs. Patient underwent cardiac arrest in ED w/ ROSC. CTH shows global anoxic injury. Admitted to MICU for further management.       Plan:  - Imaging and case seen and discussed w/ Dr. Ch.   - No neurosurgical intervention is indicated at this time.   - Recommend maximum medical management.     Discussed w/ Dr. Ch

## 2024-05-20 NOTE — CONSULT NOTE ADULT - SUBJECTIVE AND OBJECTIVE BOX
Patient is a 44y old  Female who presents with a chief complaint of Cardiac arrest (20 May 2024 03:18)    HPI:  Patient is a 45 yo F with PMHx of HTN, HLD, RA, chronic GERD, hemorrhoids, chronic MARCELL, anxiety/MDD, bipolar with paula, lupus, and fibromyalgia presenting to [facility] ED after cardiac arrest. Patient was found down, apneic and pulseless at [facility] with drug paraphernalia (crack pipe, needles) around her. Unclear how long patient was down for. Given intranasal narcan at the scene without any changes. 15 minutes of ACLS was performed in the field w/ ROSC. Received 2 of epi, and was intubate. She because hypotensive enroute and was given a third dose of epi. She arrived in the ED and pulse was lost and again one more round of compressions performed w/ ROSC. Patient was started on pressor support.     Of note, she was seen at [facility] 3 days ago 5/17 for RLQ pain. LMP 5/6.   Spoke with daughter who lives in [place]. She is flying in from [place]     In the ED:  Initial vital signs: T: 97.3F, HR: 73, BP:76/40 R:  SpO2: 91% on BVM  Labs:  Hgb 10.6; pH 6.99; lactate 7.2; Na 132; HCO3 17; ; ; UTox   Imaging:   Interventions: 1L LR; Levophed; 50meq bicarb x2 and bicarb drip @150mL/hr   (19 May 2024 23:48)        PAST MEDICAL & SURGICAL HISTORY:    FAMILY HISTORY:      SOCIAL HISTORY: Reviewed  Tobacco Use: Reviewed  EtOH use: Reviewed  Substance: Reviewed    Allergies    No Known Allergies    Intolerances        REVIEW OF SYSTEMS  unable to obtain    HOME MEDICATIONS:  Home Medications:  amLODIPine 10 mg oral tablet: 1 tab(s) orally once a day (20 May 2024 05:55)  atorvastatin 40 mg oral tablet: 1 tab(s) orally once a day (19 May 2024 23:56)  carvedilol 6.25 mg oral tablet: 1 tab(s) orally 2 times a day (19 May 2024 23:55)  famotidine 20 mg oral tablet: 1 tab(s) orally once a day (19 May 2024 23:54)  ferrous sulfate 325 mg (65 mg elemental iron) oral delayed release tablet: 1 tab(s) orally once a day (19 May 2024 23:58)  FLUoxetine (Eqv-PROzac) 20 mg oral tablet: 1 tab(s) orally once a day (19 May 2024 23:51)  gabapentin 800 mg oral tablet: 1 tab(s) orally 3 times a day (19 May 2024 23:53)  hydroCHLOROthiazide 25 mg oral tablet: 1 tab(s) orally once a day (19 May 2024 23:56)  lithium 150 mg oral capsule: 3 cap(s) orally 2 times a day (19 May 2024 23:52)  losartan 100 mg oral tablet: 1 tab(s) orally once a day (19 May 2024 23:55)  methotrexate 2.5 mg oral tablet: orally 3 times a week (20 May 2024 00:00)  traZODone 100 mg oral tablet: orally once a day (at bedtime) (19 May 2024 23:50)      MEDICATIONS:  Antibiotics:  piperacillin/tazobactam IVPB.. 3.375 Gram(s) IV Intermittent every 8 hours  rifAXIMin 550 milliGRAM(s) Oral two times a day  vancomycin  IVPB 1500 milliGRAM(s) IV Intermittent every 12 hours    Neuro:  acetaminophen   IVPB .. 1000 milliGRAM(s) IV Intermittent once    Anticoagulation:  enoxaparin Injectable 40 milliGRAM(s) SubCutaneous every 24 hours    OTHER:  chlorhexidine 0.12% Liquid 15 milliLiter(s) Oral Mucosa every 12 hours  chlorhexidine 2% Cloths 1 Application(s) Topical <User Schedule>  lactulose Syrup 20 Gram(s) Oral every 6 hours  pantoprazole  Injectable 40 milliGRAM(s) IV Push daily    IVF:  sodium chloride 3%. 500 milliLiter(s) IV Continuous <Continuous>      Vital Signs Last 24 Hrs  T(C): 40.6 (20 May 2024 11:00), Max: 40.6 (20 May 2024 11:00)  T(F): 105 (20 May 2024 11:00), Max: 105 (20 May 2024 11:00)  HR: 109 (20 May 2024 11:00) (73 - 121)  BP: 96/50 (20 May 2024 11:00) (74/45 - 210/96)  BP(mean): 71 (20 May 2024 11:00) (55 - 151)  RR: 17 (20 May 2024 11:00) (11 - 26)  SpO2: 95% (20 May 2024 11:00) (91% - 100%)    Parameters below as of 20 May 2024 11:00  Patient On (Oxygen Delivery Method): ventilator    O2 Concentration (%): 30          LABS:                        14.8   17.03 )-----------( 412      ( 20 May 2024 06:22 )             47.8     05-20    138  |  106  |  28<H>  ----------------------------<  159<H>  3.9   |  17<L>  |  1.11    Ca    7.9<L>      20 May 2024 08:55  Phos  2.4     05-20  Mg     2.0     05-20    TPro  7.5  /  Alb  4.1  /  TBili  0.2  /  DBili  x   /  AST  583<H>  /  ALT  446<H>  /  AlkPhos  99  05-20    PT/INR - ( 19 May 2024 21:54 )   PT: 9.8 sec;   INR: 0.86          PTT - ( 19 May 2024 21:54 )  PTT:29.1 sec  Urinalysis Basic - ( 20 May 2024 08:55 )    Color: x / Appearance: x / SG: x / pH: x  Gluc: 159 mg/dL / Ketone: x  / Bili: x / Urobili: x   Blood: x / Protein: x / Nitrite: x   Leuk Esterase: x / RBC: x / WBC x   Sq Epi: x / Non Sq Epi: x / Bacteria: x        CULTURES:      RADIOLOGY & ADDITIONAL STUDIES:

## 2024-05-20 NOTE — CONSULT NOTE ADULT - PROBLEM SELECTOR RECOMMENDATION 3
.  Noted to have anoxic injury in the setting of cardiac arrest  -awaiting correction of reversible conditions (temperature, electrolytes, etc)  -further prognostication pending clinical course

## 2024-05-20 NOTE — INITIAL ORGAN DONATION REFERRAL - NSORGANDONATIONCLINICALTRIGGER_GEN_ALL_CORE
Absence of TWO or more brain stem reflexes/[place] Coma Scale is less than or equal to 5/Family discussion withdrawal of life-sustaining therapies is anticipated

## 2024-05-20 NOTE — CONSULT NOTE ADULT - SUBJECTIVE AND OBJECTIVE BOX
Neurology Consult    Patient is a 44y old  Female who presents with a chief complaint of cardiac arrest (19 May 2024 22:32)      HPI:  Patient is a 45 yo F with PMHx of HTN, HLD, RA, chronic GERD, hemorrhoids, chronic MARCELL, anxiety/MDD, bipolar with paula, lupus, and fibromyalgia presenting to [facility] ED after cardiac arrest. Patient was found down, apneic and pulseless at [facility] with drug paraphernalia (crack pipe, needles) around her. Unclear how long patient was down for. Given intranasal narcan at the scene without any changes. 15 minutes of ACLS was performed in the field w/ ROSC. Received 2 of epi, and was intubate. She because hypotensive enroute and was given a third dose of epi. She arrived in the ED and pulse was lost and again one more round of compressions performed w/ ROSC. Patient was started on pressor support.     Of note, she was seen at [facility] 3 days ago 5/17 for RLQ pain. LMP 5/6.   Spoke with daughter who lives in [place]. She is flying in from [place]     In the ED:  Initial vital signs: T: 97.3F, HR: 73, BP:76/40 R:  SpO2: 91% on BVM  Labs:  Hgb 10.6; pH 6.99; lactate 7.2; Na 132; HCO3 17; ; ; UTox   Imaging:   Interventions: 1L LR; Levophed; 50meq bicarb x2 and bicarb drip @150mL/hr   (19 May 2024 23:48)      PAST MEDICAL & SURGICAL HISTORY:      FAMILY HISTORY:      Social History: (-) x 3    Allergies: No Known Allergies      MEDICATIONS  (STANDING):  acetaminophen   IVPB .. 1000 milliGRAM(s) IV Intermittent once  acetaminophen   IVPB .. 1000 milliGRAM(s) IV Intermittent once  norepinephrine Infusion 0.05 MICROgram(s)/kG/Min (7.5 mL/Hr) IV Continuous <Continuous>  pantoprazole  Injectable 40 milliGRAM(s) IV Push daily  piperacillin/tazobactam IVPB.- 3.375 Gram(s) IV Intermittent once  piperacillin/tazobactam IVPB.. 3.375 Gram(s) IV Intermittent every 8 hours  propofol Infusion 5 MICROgram(s)/kG/Min (2.84 mL/Hr) IV Continuous <Continuous>  sodium bicarbonate  Infusion 0.281 mEq/kG/Hr (150 mL/Hr) IV Continuous <Continuous>  sodium chloride 3%. 500 milliLiter(s) (40 mL/Hr) IV Continuous <Continuous>  vancomycin  IVPB 1250 milliGRAM(s) IV Intermittent once    MEDICATIONS  (PRN):      Review of systems:  n/a    Vital Signs Last 24 Hrs  T(C): 36.3 (19 May 2024 22:30), Max: 36.3 (19 May 2024 22:30)  T(F): 97.3 (19 May 2024 22:30), Max: 97.3 (19 May 2024 22:30)  HR: 98 (20 May 2024 03:00) (73 - 116)  BP: 161/95 (20 May 2024 03:00) (74/45 - 210/96)  BP(mean): 121 (20 May 2024 03:00) (55 - 151)  RR: 15 (20 May 2024 03:00) (14 - 26)  SpO2: 96% (20 May 2024 03:00) (91% - 100%): Parameters below as of 20 May 2024 03:00  Patient On (Oxygen Delivery Method): ventilator; O2 Concentration (%): 50    Examination:  General:  Appearance is consistent with chronologic age.  No abnormal facies.  Gross skin survey within normal limits.    Cognitive/Language:  The patient is oriented to person, place, time and date.  Recent and remote memory intact.  Fund of knowledge is intact and normal.  Language with normal repetition, comprehension and naming.  Nondysarthric.    Eyes: intact VA, VFF.  EOMI w/o nystagmus, skew or reported double vision.  PERRL.  No ptosis/weakness of eyelid closure.    Face:  Facial sensation normal V1 - 3, no facial asymmetry.    Ears/Nose/Throat:  Hearing grossly intact b/l.  Palate elevates midline.  Tongue and uvula midline.   Motor examination:   Normal tone, bulk and range of motion.  No tenderness, twitching, tremors or involuntary movements.  Formal Muscle Strength Testing: (MRC grade R/L) 5/5 UE; 5/5 LE.  No observable drift.  Reflexes:   2+ b/l pectoralis, biceps, triceps, brachioradialis, patella and Achilles.  Plantar response downgoing b/l.  Jaw jerk, Lindsey, clonus absent.  Sensory examination:   Intact to light touch and pinprick, pain, temperature and proprioception and vibration in all extremities.  Cerebellum:   FTN/HKS intact with normal FAHEEM in all limbs.  No dysmetria or dysdiadokinesia.  Gait narrow based and normal.    Respiratory:  no audible wheezing or inspiratory stridor.  no use of accessory muscles.   Cardiac: pulse palpable, no audible bruits  Abdomen: supple, no guarding, no TTP    Labs:                         10.6   4.25  )-----------( 304      ( 19 May 2024 21:54 )             35.5       05-19    132<L>  |  98  |  14  ----------------------------<  287<H>  4.8   |  17<L>  |  1.06    Ca    8.6      19 May 2024 21:54  Mg     2.4     05-19    TPro  5.6<L>  /  Alb  3.3  /  TBili  <0.2  /  DBili  x   /  AST  259<H>  /  ALT  280<H>  /  AlkPhos  79  05-19    LIVER FUNCTIONS - ( 19 May 2024 21:54 )  Alb: 3.3 g/dL / Pro: 5.6 g/dL / ALK PHOS: 79 U/L / ALT: 280 U/L / AST: 259 U/L / GGT: x           PT/INR - ( 19 May 2024 21:54 )   PT: 9.8 sec;   INR: 0.86 ; PTT - ( 19 May 2024 21:54 )  PTT:29.1 sec    Urinalysis Basic - ( 20 May 2024 01:36 )  Color: Orange / Appearance: Clear / SG: >1.030 / pH: x  Gluc: x / Ketone: Negative mg/dL  / Bili: Negative / Urobili: 1.0 mg/dL   Blood: x / Protein: 30 mg/dL / Nitrite: Negative   Leuk Esterase: Negative / RBC: 30 /HPF / WBC 4 /HPF   Sq Epi: x / Non Sq Epi: 2 /HPF / Bacteria: Negative /HPF          Neuroimaging:  NCHCT: CT Head No Cont: PROCEDURE DATE:  05/19/2024      IMPRESSION: Findings consistent with anoxic brain injury. Nointracranial hemorrhage or calvarial fracture.             Patient is a 44y old  Female who presents with a chief complaint of cardiac arrest (19 May 2024 22:32)    Hx obtained from MICU signout for consult      HPI:  Patient is a 45 yo F with PMHx of HTN, HLD, RA, chronic GERD, hemorrhoids, chronic MARCELL, anxiety/MDD, bipolar with paula, lupus, and fibromyalgia presenting to [facility] ED after cardiac arrest. Patient was found down, apneic and pulseless at [facility] with drug paraphernalia (crack pipe, needles) around her. Unclear how long patient was down for. Given intranasal narcan at the scene without any changes. 15 minutes of ACLS was performed in the field w/ ROSC. Received 2 of epi, and was intubate. She because hypotensive enroute and was given a third dose of epi. She arrived in the ED and pulse was lost and again one more round of compressions performed w/ ROSC. Patient was started on pressor support.     Of note, she was seen at [facility] 3 days ago 5/17 for RLQ pain. LMP 5/6.   Spoke with daughter who lives in [place]. She is flying in from [place]     In the ED:  Initial vital signs: T: 97.3F, HR: 73, BP:76/40 R:  SpO2: 91% on BVM  Labs:  Hgb 10.6; pH 6.99; lactate 7.2; Na 132; HCO3 17; ; ; UTox   Imaging:   Interventions: 1L LR; Levophed; 50meq bicarb x2 and bicarb drip @150mL/hr   (19 May 2024 23:48)      PAST MEDICAL & SURGICAL HISTORY:      FAMILY HISTORY:      Social History: (-) x 3    Allergies: No Known Allergies      MEDICATIONS  (STANDING):  acetaminophen   IVPB .. 1000 milliGRAM(s) IV Intermittent once  acetaminophen   IVPB .. 1000 milliGRAM(s) IV Intermittent once  norepinephrine Infusion 0.05 MICROgram(s)/kG/Min (7.5 mL/Hr) IV Continuous <Continuous>  pantoprazole  Injectable 40 milliGRAM(s) IV Push daily  piperacillin/tazobactam IVPB.- 3.375 Gram(s) IV Intermittent once  piperacillin/tazobactam IVPB.. 3.375 Gram(s) IV Intermittent every 8 hours  propofol Infusion 5 MICROgram(s)/kG/Min (2.84 mL/Hr) IV Continuous <Continuous>  sodium bicarbonate  Infusion 0.281 mEq/kG/Hr (150 mL/Hr) IV Continuous <Continuous>  sodium chloride 3%. 500 milliLiter(s) (40 mL/Hr) IV Continuous <Continuous>  vancomycin  IVPB 1250 milliGRAM(s) IV Intermittent once    MEDICATIONS  (PRN):      Review of systems:  n/a    Vital Signs Last 24 Hrs  T(C): 36.3 (19 May 2024 22:30), Max: 36.3 (19 May 2024 22:30)  T(F): 97.3 (19 May 2024 22:30), Max: 97.3 (19 May 2024 22:30)  HR: 98 (20 May 2024 03:00) (73 - 116)  BP: 161/95 (20 May 2024 03:00) (74/45 - 210/96)  BP(mean): 121 (20 May 2024 03:00) (55 - 151)  RR: 15 (20 May 2024 03:00) (14 - 26)  SpO2: 96% (20 May 2024 03:00) (91% - 100%): Parameters below as of 20 May 2024 03:00  Patient On (Oxygen Delivery Method): ventilator; O2 Concentration (%): 50    Examination:  General:  Coma on propofol sedation, intubated, ventilator, OGT to suction   Cognitive/Language:  Unresponsive to auditory, sternal, supraorbital and trapezius noxious stimulation.    Eyes: absent blink to threat. No spontaneous eye opening. OU forward in primary gaze, neg oculocephalic response, P4mmNR. Neg corneal   Face: no facial asymmetry.    Ears/Nose/Throat: ET + OGT intact and functional. - cough/- GAG  Motor examination:   Normal bulk. Flaccid throughout. No w/drawal, myoclonus, triple flex or posturing to noxious   Reflexes:   2+ b/l biceps, brachioradialis. Areflexic patella and Achilles.  Plantar response mute b/l.  - Lindsey, clonus absent.  Sensory examination:   NR in all extremities.    overbreathing ventilator    Labs:                         10.6   4.25  )-----------( 304      ( 19 May 2024 21:54 )             35.5       05-19    132<L>  |  98  |  14  ----------------------------<  287<H>  4.8   |  17<L>  |  1.06    Ca    8.6      19 May 2024 21:54  Mg     2.4     05-19    TPro  5.6<L>  /  Alb  3.3  /  TBili  <0.2  /  DBili  x   /  AST  259<H>  /  ALT  280<H>  /  AlkPhos  79  05-19    LIVER FUNCTIONS - ( 19 May 2024 21:54 )  Alb: 3.3 g/dL / Pro: 5.6 g/dL / ALK PHOS: 79 U/L / ALT: 280 U/L / AST: 259 U/L / GGT: x           PT/INR - ( 19 May 2024 21:54 )   PT: 9.8 sec;   INR: 0.86 ; PTT - ( 19 May 2024 21:54 )  PTT:29.1 sec    Urinalysis Basic - ( 20 May 2024 01:36 )  Color: Orange / Appearance: Clear / SG: >1.030 / pH: x  Gluc: x / Ketone: Negative mg/dL  / Bili: Negative / Urobili: 1.0 mg/dL   Blood: x / Protein: 30 mg/dL / Nitrite: Negative   Leuk Esterase: Negative / RBC: 30 /HPF / WBC 4 /HPF   Sq Epi: x / Non Sq Epi: 2 /HPF / Bacteria: Negative /HPF          Neuroimaging:  NCHCT: CT Head No Cont: PROCEDURE DATE:  05/19/2024      IMPRESSION: Findings consistent with anoxic brain injury. Nointracranial hemorrhage or calvarial fracture.

## 2024-05-20 NOTE — EEG REPORT - NS EEG TEXT BOX
[facility] Department of Neurology  Inpatient Continuous video-Electroencephalogram  [address], [address] [place] [id number], T: [phone]    Patient Name:	BENNY SORIANO    :	1980  MRN:	[id number]    Study Start Date/Time:	2024, 2:58:09 AM  Study End Date/Time:    Referred by:  Glenn Maldonado MD    Brief Clinical History:  BENNY SORIANO is a 44 year old Female admitted after cardiac arrest, found down apneic and pulseless; study performed to investigate for seizures or markers of epilepsy.  Technologist notes: -  Diagnosis Code:  R56.9 convulsions/seizure    Pertinent Medication:  propofol Infusion 5 MICROgram(s)/kG/Min (2.84 mL/Hr) IV Continuous <Continuous>    Acquisition Details:  Electroencephalography was acquired using a minimum of 21 channels on an [employer] system v 9.3.1 with electrode placement according to the standard International 10-20 system following ACNS (American Clinical Neurophysiology Society) guidelines.  Anterior temporal T1 and T2 electrodes were utilized whenever possible.  The [employer] automated spike & seizure detections were all reviewed in detail, in addition to the entire raw EEG.    Findings:  Day 1:  2024, 2:58:09 AM to next morning at 07:00 AM   Background:  suppressed (completely <10 uV)  Voltage:   Suppressed (all <10 uV)  Organization:   Absent  Symmetry/Focality: Continuous (90+%)   possible right temporal 0-5mV fast alpha/beta activity of cerebral origin  Posterior Dominant Rhythm:  No clear PDR     Sleep:  Absent.  Variability:   No		Reactivity:  Unknown (stimulation periods not identified)    Spontaneous Activity:  No epileptiform discharges   Events:  •	No electrographic seizures or significant clinical events occurred during this study.  Provocations:  •	Hyperventilation: was not performed.  •	Photic stimulation: was not performed.  Daily Summary:    •	There was suppression of cerebral activity, with only a possibility of cerebral activity seen over the right temporal region, though still significantly attenuation.   Heavily sedating medications can contribute to diffuse attenuation, which suggests cortical dysfunction or alternatively extra-cerebral absorption of electrical activity such as fluid collections.  •	There were no findings of active epilepsy, however this alone does not rule out the diagnosis.         Lamin Corrales MD  Attending Neurologist, [facility] Epilepsy Program

## 2024-05-20 NOTE — PROGRESS NOTE ADULT - ASSESSMENT
Neuro  #Anoxic brain injury 2/2 to cardiiac arrest  UTox positive for cocaine. Briefly on pressor support. Now weaned.   CT Head - effacement of the cerebral sulci, ventricles and cisternal spaces secondary to cerebral edema. Findings consistent with anoxic brain injury.  - Given anoxic brain injury, aim for permissive hypertension for adequate perfusion in the setting of unknown ICP, aim for MAP >80  - given cerebral edema, would aim for permissive hypocapnia  - goal sodium 150, start hypertonic saline; goal 140 by AM  - q4 BMP  - targeted temperature managment, maintain normothermia  - vEEG  - f/u neuron specific enolase  - GOC and Palliative consult in AM  - Neuro consult    #Anxiety / Depression  #Bipolar 1 Diagnosis  on lithium.   - lithium level 0.26  - hold home med    #Sedated  #Intubated  - on Propofol    Pulm  #Intubation  On ventilator. VBG consistent with respiratory acidosis.  Current settings: FIO2 40; ; RR 24; PEEP 5  Cheye dubois breathing noted  - f/u serial ABGs    #Aspiration  CT chest - bilateral consolidations in lung bases, pending radiology read  - f/u CT Chest read  - started on vanc and zosyn for empiric coverage  - f/u blood cultures    Cards  #Cardiac Arrest  ACLS x2. ROSC obtained. pH on VBG 6.9 with pattern consistent with respiratory acidosis.   UTox positive for cocaine; however no signs of ACS with negative trops and non-ischemic EKG. High suspicion of fentanyl overdose  - f/u fentanyl level  - f/u TTE  - trend lactate   - Pacer pads in place   - follow cerebral edema/neuroprotective protocol outlined above     #HTN  home medications Losartan 100mg, HCTZ 25mg, Norvasc 6.25 BID   - holding home meds    #HLD  home medication Atorvastatin 40mg    - C/w Lipitor 40    GI  #Transaminitis  Likely shock liver i/s/o cardiac arrest. Distension on physical exam  AST/ALT ratio 1:1; serum alcohol negative. unlikely alcohol induced liver injury  - continue to monitor  - trend to peak  - f/u hepatitis panel  - f/u CT Abdomen    #Ileus  likely 2/2 to fentanyl overdose. Found with drug paraphernalia nearby.  CT Abdomen -colon dilated to 8 cm with gas and semi solid/liquid stool. Stomach distended with gas and fluid.   - OG decompression  - rectal tube  - Reglan vs erythromycin vs peripherally acting opoid antagonist if not responding to above therapy    #Chronic constipation  Home medications: Dulcolax and Miralax qd for constipation    #Chronic GERD  Home medication: omeprazole 40 qd    Renal  - f/u urine lytes    Rheum  #RA  Managed on Humira 40mg, MTX 2.5mg, and Naproxen PRN.    - hold Humira, MTX, leukovorin, and Naproxen    Endo  JADE    ID  JADE    Heme  #Chronic MARCELL  Low HGB as of 08/2023, per outside records  Home medication: Ferrous Sulfate    PPX  F: s/p 1L LR  E: Replete  N: NPO  DVT ppx: SCDs  Dispo: MICU     FULL CODE     Assessment: 44 pmhx of bipolar (on lithium), htn (hctz), HLD, RA, GERD, hemorrhoids, MARCELL, lupus and fibromyalgia found to be in cardiac arrest likely 2/2 drug overdose (fentanyl and cocaine) admitted to the micu for post cardiac arrest monitoring.      Neuro  #Anoxic brain injury 2/2 to cardiac arrest  UTox positive for cocaine. Briefly on pressor support. Now weaned.   CT Head - effacement of the cerebral sulci, ventricles and cisternal spaces secondary to cerebral edema. Findings consistent with anoxic brain injury.  - neurology consulted f/u recs  - neurosurgery consulted f/u recs  - palliative consulted f/u recs  - given anoxic brain injury, aim for permissive hypertension for adequate perfusion in the setting of unknown ICP, aim for MAP >80  - given cerebral edema, would aim for permissive hypocapnia  - goal sodium 150, started on sodium chloride 3% at 30 cc/hr, given dose a 23% nacl  - q4 BMP  - targeted temperature management, maintain normothermia  - vEEG  - f/u neuron specific enolase    #Anxiety / Depression  #Bipolar 1 Diagnosis  on lithium.   - lithium level 0.26  - hold home med    #Sedated(resolved)  #Intubated  - off sedation    Pulm  #Intubation  On ventilator. VBG consistent with respiratory acidosis initially. now with resolution  Current settings: FIO2 40; ; RR 24; PEEP 5  Cheyne dubois breathing noted    #C/f Aspiration(resolved)  CT chest - no acute findings  - c/w vanc and zosyn for empiric coverage  - f/u blood cultures    Cards  #Cardiac Arrest  ACLS x2. ROSC obtained. pH on VBG 6.9 with pattern consistent with respiratory acidosis.   UTox positive for cocaine; however no signs of ACS with negative trops and non-ischemic EKG. High suspicion of fentanyl overdose  - f/u fentanyl level  - f/u TTE  - trend lactate   - Pacer pads in place   - follow cerebral edema/neuroprotective protocol outlined above     #HTN  home medications Losartan 100mg, HCTZ 25mg, Norvasc 6.25 BID   - holding home meds    #HLD  home medication Atorvastatin 40mg    - C/w Lipitor 40    GI  #Transaminitis  #C/f liver laceration  Likely shock liver i/s/o cardiac arrest. Distension on physical exam  AST/ALT ratio 1:1; serum alcohol negative. unlikely alcohol induced liver injury  CTAP: 5cm hypodense finding of liver representing irregular focal fatty infiltration vs hepatic laceration  - continue to monitor  - trend to peak  - f/u hepatitis panel  - f/u CT Abdomen    #C/f bowel ischemia  CTAP: cecum, ascending, and transverse colon dilated to 8 CM in caliber  - serial abdominal exams    #Ileus  likely 2/2 to fentanyl overdose. Found with drug paraphernalia nearby.  CT Abdomen -colon dilated to 8 cm with gas and semi solid/liquid stool. Stomach distended with gas and fluid.   - OG decompression  - rectal tube  - Reglan vs erythromycin vs peripherally acting opoid antagonist if not responding to above therapy    #Chronic constipation  Home medications: Dulcolax and Miralax qd for constipation    #Chronic GERD  Home medication: omeprazole 40 qd    Renal  cristóbal    Rheum  #RA  Managed on Humira 40mg, MTX 2.5mg, and Naproxen PRN.    - hold Humira, MTX, leukovorin, and Naproxen    Endo  CRISTÓBAL    ID  CRISTÓBAL    Heme  #Chronic MARCELL  Low HGB as of 08/2023, per outside records  Home medication: Ferrous Sulfate    PPX  E: Replete  N: NPO  DVT ppx: SCDs  Dispo: MICU     FULL CODE     Assessment: 44 pmhx of bipolar (on lithium), htn (hctz), HLD, RA, GERD, hemorrhoids, MARCELL, lupus and fibromyalgia found to be in cardiac arrest likely 2/2 drug overdose (fentanyl and cocaine) admitted to the micu for post cardiac arrest monitoring.      Neuro  #Anoxic brain injury 2/2 to cardiac arrest  UTox positive for cocaine. Briefly on pressor support. Now weaned.   CT Head - effacement of the cerebral sulci, ventricles and cisternal spaces secondary to cerebral edema. Findings consistent with anoxic brain injury.  - neurology consulted f/u recs  - neurosurgery consulted f/u recs  - palliative consulted f/u recs  - given anoxic brain injury, aim for permissive hypertension for adequate perfusion in the setting of unknown ICP, aim for MAP >80  - given cerebral edema, would aim for permissive hypocapnia  - goal sodium 145-150, started on sodium chloride 3% at 30 cc/hr, given dose a 23% nacl  - q4 BMP  - targeted temperature management, maintain normothermia  - vEEG  - f/u neuron specific enolase    #Anxiety / Depression  #Bipolar 1 Diagnosis  on lithium.   - lithium level 0.26  - hold home med    #Sedated(resolved)  #Intubated  - off sedation    Pulm  #Intubation  On ventilator. VBG consistent with respiratory acidosis initially. now with resolution  Current settings: FIO2 40; ; RR 24; PEEP 5  Cheyne dubois breathing noted    #C/f Aspiration(resolved)  CT chest - no acute findings  - c/w vanc and zosyn for empiric coverage  - f/u blood cultures    Cards  #Cardiac Arrest  ACLS x2. ROSC obtained. pH on VBG 6.9 with pattern consistent with respiratory acidosis.   UTox positive for cocaine; however no signs of ACS with negative trops and non-ischemic EKG. High suspicion of fentanyl overdose  - f/u fentanyl level  - f/u TTE  - trend lactate   - Pacer pads in place   - follow cerebral edema/neuroprotective protocol outlined above     #HTN  home medications Losartan 100mg, HCTZ 25mg, Norvasc 6.25 BID   - holding home meds    #HLD  home medication Atorvastatin 40mg    - C/w Lipitor 40    GI  #Transaminitis  #C/f liver laceration  Likely shock liver i/s/o cardiac arrest. Distension on physical exam  AST/ALT ratio 1:1; serum alcohol negative. unlikely alcohol induced liver injury  CTAP: 5cm hypodense finding of liver representing irregular focal fatty infiltration vs hepatic laceration  - continue to monitor  - trend to peak  - f/u hepatitis panel  - f/u CT Abdomen    #C/f bowel ischemia  CTAP: cecum, ascending, and transverse colon dilated to 8 CM in caliber  - serial abdominal exams    #Ileus  likely 2/2 to fentanyl overdose. Found with drug paraphernalia nearby.  CT Abdomen -colon dilated to 8 cm with gas and semi solid/liquid stool. Stomach distended with gas and fluid.   - OG decompression  - rectal tube  - Reglan vs erythromycin vs peripherally acting opoid antagonist if not responding to above therapy    #Chronic constipation  Home medications: Dulcolax and Miralax qd for constipation    #Chronic GERD  Home medication: omeprazole 40 qd    Renal  cristóbal    Rheum  #RA  Managed on Humira 40mg, MTX 2.5mg, and Naproxen PRN.    - hold Humira, MTX, leukovorin, and Naproxen    Endo  CRISTÓBAL    ID  CRISTÓBAL    Heme  #Chronic MARCELL  Low HGB as of 08/2023, per outside records  Home medication: Ferrous Sulfate    PPX  E: Replete  N: NPO  DVT ppx: SCDs  Dispo: MICU     FULL CODE     Assessment: 44 pmhx of bipolar (on lithium), htn (hctz), HLD, RA, GERD, hemorrhoids, MARCELL, lupus and fibromyalgia found to be in cardiac arrest likely 2/2 drug overdose (fentanyl and cocaine) admitted to the micu for post cardiac arrest monitoring.      Neuro  #Anoxic brain injury 2/2 to cardiac arrest  UTox positive for cocaine. Briefly on pressor support. Now weaned.   CT Head - effacement of the cerebral sulci, ventricles and cisternal spaces secondary to cerebral edema. Findings consistent with anoxic brain injury.  - neurology consulted f/u recs  - neurosurgery consulted f/u recs  - palliative consulted f/u recs  - given anoxic brain injury, aim for permissive hypertension for adequate perfusion in the setting of unknown ICP, aim for MAP >80  - given cerebral edema, would aim for permissive hypocapnia  - goal sodium 145-150, started on sodium chloride 3% at 30 cc/hr, given dose a 23% nacl  - q4 BMP  - targeted temperature management, maintain normothermia  - vEEG  - f/u neuron specific enolase    #Anxiety / Depression  #Bipolar 1 Diagnosis  on lithium.   - lithium level 0.26  - hold home med    #Sedated(resolved)  #Intubated  - off sedation    #C/f hepatic encephalopathy  - started lactulose 20g q6 and rifaximin 550 bid    Pulm  #Intubation  On ventilator. VBG consistent with respiratory acidosis initially. now with resolution  Current settings: FIO2 40; ; RR 24; PEEP 5  Cheyne dubois breathing noted    #C/f Aspiration(resolved)  CT chest - no acute findings  - c/w vanc and zosyn for empiric coverage  - f/u blood cultures    Cards  #Cardiac Arrest  ACLS x2. ROSC obtained. pH on VBG 6.9 with pattern consistent with respiratory acidosis.   UTox positive for cocaine; however no signs of ACS with negative trops and non-ischemic EKG. High suspicion of fentanyl overdose  - f/u fentanyl level  - f/u TTE  - trend lactate   - Pacer pads in place   - follow cerebral edema/neuroprotective protocol outlined above     #HTN  home medications Losartan 100mg, HCTZ 25mg, Norvasc 6.25 BID   - holding home meds    #HLD  home medication Atorvastatin 40mg    - C/w Lipitor 40    GI  #Transaminitis  #C/f liver laceration  Likely shock liver i/s/o cardiac arrest. Distension on physical exam  AST/ALT ratio 1:1; serum alcohol negative. unlikely alcohol induced liver injury  CTAP: 5cm hypodense finding of liver representing irregular focal fatty infiltration vs hepatic laceration  - continue to monitor  - trend to peak  - f/u hepatitis panel  - f/u CT Abdomen    #C/f bowel ischemia  CTAP: cecum, ascending, and transverse colon dilated to 8 CM in caliber  - serial abdominal exams    #Ileus  likely 2/2 to fentanyl overdose. Found with drug paraphernalia nearby.  CT Abdomen -colon dilated to 8 cm with gas and semi solid/liquid stool. Stomach distended with gas and fluid.   - OG decompression  - rectal tube  - Reglan vs erythromycin vs peripherally acting opoid antagonist if not responding to above therapy    #Chronic constipation  Home medications: Dulcolax and Miralax qd for constipation    #Chronic GERD  Home medication: omeprazole 40 qd    Renal  cristóbal    Rheum  #RA  Managed on Humira 40mg, MTX 2.5mg, and Naproxen PRN.    - hold Humira, MTX, leukovorin, and Naproxen    Endo  CRISTÓBAL    ID  CRISTÓBAL    Heme  #Chronic MARCELL  Low HGB as of 08/2023, per outside records  Home medication: Ferrous Sulfate    PPX  E: Replete  N: NPO  DVT ppx: SCDs  Dispo: MICU     FULL CODE     Assessment: 44 pmhx of bipolar (on lithium), htn (hctz), HLD, RA, GERD, hemorrhoids, MARCELL, lupus and fibromyalgia found to be in cardiac arrest possible 2/2 drug overdose (cocaine w/ possibly laced with fentanyl) admitted to the micu for post resuscitation care.    Neuro  #Anoxic brain injury 2/2 to cardiac arrest  UTox positive for cocaine. Briefly on pressor support and weaned.  CT Head - effacement of the cerebral sulci, ventricles and cisternal spaces secondary to cerebral edema. Findings consistent with anoxic brain injury.  - neurology consulted f/u recs  - neurosurgery consulted f/u recs  - palliative consulted f/u recs  - anoxic brain injury, aim for permissive hypertension for adequate perfusion in the setting of unknown ICP, aim for MAP >80  - cerebral edema, aim for permissive hypocapnia  - goal sodium 145-150 5/20, started on sodium chloride 3% at 30 cc/hr, given dose of 23% nacl x1  - q4 BMP  - targeted temperature management, maintain normothermia (32 - 37.5C)  - vEEG  - f/u neuron specific enolase    #Anxiety / Depression  #Bipolar 1 Diagnosis  On lithium 450mg in am and 450mg qpm. lithium level 0.26. On hctz, managed by primary at [facility]. Lithium levels should be elevated iso of hctz use, however level indicates underdosing. Unknown last change in regimen. C/f underdosing leading to paula and SI.  Last refilled 4/25/24. Psychiatrist at [facility].  - hold home med    #Sedated(resolved)  #Intubated  - off sedation    #C/f hepatic encephalopathy  - started lactulose 20g q6 and rifaximin 550 bid    Pulm  #Intubation  On ventilator. VBG consistent with respiratory acidosis initially. now with resolution  Current settings: FIO2 40; ; RR 24; PEEP 5  Cheyne dubois breathing noted    #C/f Aspiration(resolved)  CT chest - no acute findings  - c/w vanc and zosyn for empiric coverage  - f/u blood cultures    Cards  #Cardiac Arrest  ACLS x2. ROSC obtained. pH on VBG 6.9 with pattern consistent with respiratory acidosis.   UTox positive for cocaine; however no signs of vasospasm > acute coronary syndrome with negative trops and non-ischemic EKG. High suspicion of fentanyl overdose leading to respiratory depression > hypercapnia > acidosis > cardiac arrest  - f/u fentanyl level  - f/u TTE  - Pacer pads in place   - follow cerebral edema/neuroprotective protocol outlined above     #HTN  home medications Losartan 100mg, HCTZ 25mg, Norvasc 6.25 BID   - holding home meds    #HLD  home medication Atorvastatin 40mg    - holding home Lipitor 40    GI  #Transaminitis  #C/f liver laceration  Likely shock liver i/s/o cardiac arrest. Distension on physical exam  AST/ALT ratio 1:1; serum alcohol negative. unlikely alcohol induced liver injury  CTAP: 5cm hypodense finding of liver representing irregular focal fatty infiltration vs hepatic laceration  - serial abdominal exams  - f/u hepatitis panel    #C/f bowel ischemia  Bowel ischemia iso of cocaine and hypoperfusion  CTAP: cecum, ascending, and transverse colon dilated to 8 CM in caliber  - serial abdominal exams    #Ileus  likely 2/2 to fentanyl overdose. Found with drug paraphernalia nearby.  CT Abdomen -colon dilated to 8 cm with gas and semi solid/liquid stool. Stomach distended with gas and fluid.   - OG decompression  - rectal tube  - Reglan vs erythromycin vs peripherally acting opoid antagonist if not responding to above therapy    #Chronic constipation  Home medications: Dulcolax and Miralax qd for constipation    #Chronic GERD  Home medication: omeprazole 40 qd    Renal  cristóbal    Rheum  #RA  Managed on Humira 40mg, MTX 2.5mg, and Naproxen PRN.    - hold Humira, MTX, leukovorin, and Naproxen    Endo  CRISTÓBAL    ID  CRISTÓBAL    Heme  #Chronic MARCELL  Low HGB as of 08/2023, per outside records  Home medication: Ferrous Sulfate    PPX  E: Replete  N: NPO  GI: ppi qD  DVT ppx: lovenox  Dispo: MICU     FULL CODE     Assessment: 44 pmhx of bipolar (on lithium), htn (hctz), HLD, RA, GERD, hemorrhoids, MARCELL, lupus and fibromyalgia found to be in cardiac arrest possible 2/2 drug overdose (cocaine w/ possibly laced with fentanyl) admitted to the micu for post resuscitation care.    Neuro  #Anoxic brain injury 2/2 to cardiac arrest  UTox positive for cocaine. Briefly on pressor support and weaned.  CT Head - effacement of the cerebral sulci, ventricles and cisternal spaces secondary to cerebral edema. Findings consistent with anoxic brain injury.  - neurology consulted f/u recs  - neurosurgery consulted f/u recs  - palliative consulted f/u recs  - anoxic brain injury, aim for permissive hypertension for adequate perfusion in the setting of unknown ICP, aim for MAP >80  - goal sodium 145-150 5/20, started on sodium chloride 3% at 30 cc/hr, given dose of 23% nacl x1  - q4 BMP  - targeted temperature management, maintain normothermia (32 - 37.5C)  - vEEG  - f/u neuron specific enolase    #Anxiety / Depression  #Bipolar 1 Diagnosis  On lithium 450mg in am and 450mg qpm. lithium level 0.26. On hctz, managed by primary at [facility]. Lithium levels should be elevated iso of hctz use, however level indicates underdosing. Unknown last change in regimen. C/f underdosing leading to paula and SI.  Last refilled 4/25/24. Psychiatrist at [facility].  - hold home med    #Sedated(resolved)  #Intubated  - off sedation    #C/f hepatic encephalopathy  - started lactulose 20g q6 and rifaximin 550 bid    Pulm  #Intubation  On ventilator. VBG consistent with respiratory acidosis initially. now with resolution  Current settings: FIO2 40; ; RR 24; PEEP 5  Cheyne dubois breathing noted    #C/f Aspiration(resolved)  CT chest - no acute findings  - c/w vanc and zosyn for empiric coverage  - f/u blood cultures    Cards  #Cardiac Arrest  ACLS x2. ROSC obtained. pH on VBG 6.9 with pattern consistent with respiratory acidosis.   UTox positive for cocaine; however no signs of vasospasm > acute coronary syndrome with negative trops and non-ischemic EKG. High suspicion of fentanyl overdose leading to respiratory depression > hypercapnia > acidosis > cardiac arrest  - f/u fentanyl level  - f/u TTE  - Pacer pads in place   - follow cerebral edema/neuroprotective protocol outlined above     #HTN  home medications Losartan 100mg, HCTZ 25mg, Norvasc 6.25 BID   - holding home meds    #HLD  home medication Atorvastatin 40mg    - holding home Lipitor 40    GI  #Transaminitis  #C/f liver laceration  Likely shock liver i/s/o cardiac arrest. Distension on physical exam  AST/ALT ratio 1:1; serum alcohol negative. unlikely alcohol induced liver injury  CTAP: 5cm hypodense finding of liver representing irregular focal fatty infiltration vs hepatic laceration  - serial abdominal exams  - f/u hepatitis panel    #C/f bowel ischemia  Bowel ischemia iso of cocaine and hypoperfusion  CTAP: cecum, ascending, and transverse colon dilated to 8 CM in caliber  - serial abdominal exams    #Ileus  likely 2/2 to fentanyl overdose. Found with drug paraphernalia nearby.  CT Abdomen -colon dilated to 8 cm with gas and semi solid/liquid stool. Stomach distended with gas and fluid.   - OG decompression  - rectal tube  - Reglan vs erythromycin vs peripherally acting opoid antagonist if not responding to above therapy    #Chronic constipation  Home medications: Dulcolax and Miralax qd for constipation    #Chronic GERD  Home medication: omeprazole 40 qd    Renal  cristóbal    Rheum  #RA  Managed on Humira 40mg, MTX 2.5mg, and Naproxen PRN.    - hold Humira, MTX, leukovorin, and Naproxen    Endo  CRISTÓBAL    ID  CRISTÓBAL    Heme  #Chronic MARCELL  Low HGB as of 08/2023, per outside records  Home medication: Ferrous Sulfate    PPX  E: Replete  N: NPO  GI: ppi qD  DVT ppx: lovenox  Dispo: MICU     FULL CODE     Assessment: 44 pmhx of bipolar (on lithium), htn (hctz), HLD, RA, GERD, hemorrhoids, MARCELL, lupus and fibromyalgia found to be in cardiac arrest possible 2/2 drug overdose (cocaine w/ possibly laced with fentanyl) admitted to the micu for post resuscitation care.    Neuro  #Anoxic brain injury 2/2 to cardiac arrest  UTox positive for cocaine. Briefly on pressor support and weaned.  CT Head - effacement of the cerebral sulci, ventricles and cisternal spaces secondary to cerebral edema. Findings consistent with anoxic brain injury.  - neurology consulted f/u recs  - neurosurgery consulted f/u recs  - palliative consulted f/u recs  - anoxic brain injury, aim for permissive hypertension for adequate perfusion in the setting of unknown ICP, aim for MAP >80  - goal sodium 145-150 5/20, started on sodium chloride 3% at 30 cc/hr, given dose of 23% nacl x1, mannitol 100 x1  - q4 BMP  - targeted temperature management, maintain normothermia (32 - 37.5C)  - vEEG  - f/u neuron specific enolase    #Anxiety / Depression  #Bipolar 1 Diagnosis  On lithium 450mg in am and 450mg qpm. lithium level 0.26. On hctz, managed by primary at [facility]. Lithium levels should be elevated iso of hctz use, however level indicates underdosing. Unknown last change in regimen. C/f underdosing leading to paula and SI.  Last refilled 4/25/24. Psychiatrist at [facility].  - hold home med    #Sedated(resolved)  #Intubated  - off sedation    #C/f hepatic encephalopathy  - started lactulose 20g q6 and rifaximin 550 bid    Pulm  #Intubation  On ventilator. VBG consistent with respiratory acidosis initially. now with resolution  Current settings: FIO2 40; ; RR 24; PEEP 5  Cheyne dubois breathing noted    #C/f Aspiration(resolved)  CT chest - no acute findings  - c/w vanc and zosyn for empiric coverage  - f/u blood cultures    Cards  #Cardiac Arrest  ACLS x2. ROSC obtained. pH on VBG 6.9 with pattern consistent with respiratory acidosis.   UTox positive for cocaine; however no signs of vasospasm > acute coronary syndrome with negative trops and non-ischemic EKG. High suspicion of fentanyl overdose leading to respiratory depression > hypercapnia > acidosis > cardiac arrest  - f/u fentanyl level  - f/u TTE  - Pacer pads in place   - follow cerebral edema/neuroprotective protocol outlined above     #HTN  home medications Losartan 100mg, HCTZ 25mg, Norvasc 6.25 BID   - holding home meds    #HLD  home medication Atorvastatin 40mg    - holding home Lipitor 40    GI  #Transaminitis  #C/f liver laceration  Likely shock liver i/s/o cardiac arrest. Distension on physical exam  AST/ALT ratio 1:1; serum alcohol negative. unlikely alcohol induced liver injury  CTAP: 5cm hypodense finding of liver representing irregular focal fatty infiltration vs hepatic laceration  - serial abdominal exams  - f/u hepatitis panel    #C/f bowel ischemia  Bowel ischemia iso of cocaine and hypoperfusion  CTAP: cecum, ascending, and transverse colon dilated to 8 CM in caliber  - serial abdominal exams    #Ileus  likely 2/2 to fentanyl overdose. Found with drug paraphernalia nearby.  CT Abdomen -colon dilated to 8 cm with gas and semi solid/liquid stool. Stomach distended with gas and fluid.   - OG decompression  - rectal tube  - Reglan vs erythromycin vs peripherally acting opoid antagonist if not responding to above therapy    #Chronic constipation  Home medications: Dulcolax and Miralax qd for constipation    #Chronic GERD  Home medication: omeprazole 40 qd    Renal  cristóbal    Rheum  #RA  Managed on Humira 40mg, MTX 2.5mg, and Naproxen PRN.    - hold Humira, MTX, leukovorin, and Naproxen    Endo  CRISTÓBAL    ID  CRISTÓBAL    Heme  #Chronic MARCELL  Low HGB as of 08/2023, per outside records  Home medication: Ferrous Sulfate    PPX  E: Replete  N: NPO  GI: ppi qD  DVT ppx: lovenox  Dispo: MICU     FULL CODE     Assessment: 44 pmhx of bipolar (on lithium), htn (hctz), HLD, RA, GERD, hemorrhoids, MARCELL, lupus and fibromyalgia found to be in cardiac arrest possible 2/2 drug overdose (cocaine w/ possibly laced with fentanyl) admitted to the micu for post resuscitation care.    Neuro  #Anoxic brain injury 2/2 to cardiac arrest  UTox positive for cocaine. Briefly on pressor support and weaned.  CT Head - effacement of the cerebral sulci, ventricles and cisternal spaces secondary to cerebral edema. Findings consistent with anoxic brain injury.  - neurology consulted f/u recs  - neurosurgery consulted f/u recs  - palliative consulted f/u recs  - anoxic brain injury, aim for permissive hypertension for adequate perfusion in the setting of unknown ICP, aim for MAP >80  - goal sodium 145-150 5/20, started on sodium chloride 3% at 30 cc/hr, given dose of 23% nacl x1, mannitol 100 x1  - q4 BMP  - targeted temperature management, maintain normothermia (32 - 37.5C)  - vEEG  - f/u neuron specific enolase    #Anxiety / Depression  #Bipolar 1 Diagnosis  On lithium 450mg in am and 450mg qpm. lithium level 0.26. On hctz, managed by primary at [facility]. Lithium levels should be elevated iso of hctz use, however level indicates underdosing. Unknown last change in regimen. Last refilled 4/25/24. Psychiatrist at [facility].  - hold home med    #Sedated(resolved)  #Intubated  - off sedation    #C/f hepatic encephalopathy  - started lactulose 20g q6 and rifaximin 550 bid    Pulm  #Intubation  On ventilator. VBG consistent with respiratory acidosis initially. now with resolution  Current settings: FIO2 40; ; RR 24; PEEP 5    #C/f Aspiration(resolved)  CT chest - no acute findings  - c/w vanc and zosyn for empiric coverage  - f/u blood cultures    Cards  #Cardiac Arrest  ACLS x2. ROSC obtained. pH on VBG 6.9 with pattern consistent with respiratory acidosis.   UTox positive for cocaine; however no signs of vasospasm > acute coronary syndrome with negative trops and non-ischemic EKG. High suspicion of fentanyl overdose leading to respiratory depression > hypercapnia > acidosis > cardiac arrest  - f/u fentanyl level  - f/u TTE  - Pacer pads in place   - follow cerebral edema/neuroprotective protocol outlined above     #HTN  home medications Losartan 100mg, HCTZ 25mg, Norvasc 6.25 BID   - holding home meds    #HLD  home medication Atorvastatin 40mg    - holding home Lipitor 40    GI  #Transaminitis  #C/f liver laceration  Likely shock liver i/s/o cardiac arrest. Distension on physical exam  AST/ALT ratio 1:1; serum alcohol negative. unlikely alcohol induced liver injury  CTAP: 5cm hypodense finding of liver representing irregular focal fatty infiltration vs hepatic laceration  - serial abdominal exams  - f/u hepatitis panel    #C/f bowel ischemia  Bowel ischemia iso of cocaine and hypoperfusion  CTAP: cecum, ascending, and transverse colon dilated to 8 CM in caliber  - serial abdominal exams    #Ileus  likely 2/2 to fentanyl overdose. Found with drug paraphernalia nearby.  CT Abdomen -colon dilated to 8 cm with gas and semi solid/liquid stool. Stomach distended with gas and fluid.   - OG decompression  - rectal tube  - Reglan vs erythromycin vs peripherally acting opoid antagonist if not responding to above therapy    #Chronic constipation  Home medications: Dulcolax and Miralax qd for constipation    #Chronic GERD  Home medication: omeprazole 40 qd    Renal  cristóbal    Rheum  #RA  Managed on Humira 40mg, MTX 2.5mg, and Naproxen PRN.    - hold Humira, MTX, leukovorin, and Naproxen    Endo  CRISTÓBAL    ID  CRISTÓBAL    Heme  #Chronic MARCELL  Low HGB as of 08/2023, per outside records  Home medication: Ferrous Sulfate    PPX  E: Replete  N: NPO  GI: ppi qD  DVT ppx: lovenox  Dispo: MICU     FULL CODE

## 2024-05-20 NOTE — CONSULT NOTE ADULT - ASSESSMENT
45yo F post cardiac arrest suspicious for substance abuse s/p Narcan (zero effect), s/p ACLS x 2 (15mins in the field and 1 round ED) w/ ROSC s/p 2 rounds of EPI and a 3rd for hypotension. intubated/sedated. Lactic acidosis, transaminitis, hyperammonemia, pos cocaine. CTH w/ sulci effacement, tight ventricles and unclear grey-white differentiation, No hemorrhage or trauma. Neurology consulted for initial assessment to neuroprognostication. Neuroimaging is consistent w/ anoxic brain injury 2/2 cardiac arrest. At this time prognosis remained guarded while sedated. This will be a baseline neurologic assessment given the acuity of presentation and will re-examen 72 post arrest, off sedation and correction of her electrolyte abnormality for neuroprognostication.       Post cardiac arrest  Transaminitis - shock vs MTX s/e  Hyperammonemia   Pos Cocaine drugs tox        Recommendations  Neuro and Vitals assessment per MICU  monitor clinically for signs of seizure  wean off propofol   continuous vEEG monitoring  repeat CTH 72hrs  f/u NSE  maintain seizure and aspiration risk precaution  correct electrolyte imbalance  43yo F post cardiac arrest suspicious for substance intox s/p Narcan (zero effect), s/p ACLS x 2 (15mins in the field and 1 round ED) w/ ROSC s/p 2 rounds of EPI and a 3rd for hypotension. intubated/sedated. Lactic acidosis, transaminitis, hyperammonemia, pos cocaine. CTH w/ sulci effacement, tight ventricles and unclear grey-white differentiation, No hemorrhage or trauma. Neurology consulted for initial assessment to neuroprognostication. Neuroimaging is consistent w/ anoxic brain injury 2/2 cardiac arrest. On Exam comatose vs propofol sedation, febrile, PVD otherwise absent BSR. At this time prognosis remained guarded while sedated. This will be a baseline neurologic assessment given the acuity of presentation and will re-examen 72 post arrest, off sedation and correction of her electrolyte abnormality for neuroprognostication.       Post cardiac arrest  Transaminitis - shock vs MTX s/e  Hyperammonemia   Pos Cocaine drugs tox        Recommendations  Neuro and Vitals assessment per MICU  monitor clinically for signs of seizure  wean off propofol when able   continuous vEEG monitoring  repeat CTH 72hrs  f/u NSE (hold off if Pt is hypothermic)  maintain seizure and aspiration risk precaution  correct electrolyte imbalance  45yo F post cardiac arrest suspicious for substance intox s/p Narcan (zero effect), s/p ACLS x 2 (15mins in the field and 1 round ED) w/ ROSC s/p 2 rounds of EPI and a 3rd for hypotension. intubated/sedated. Lactic acidosis, transaminitis, hyperammonemia, pos cocaine. CTH w/ sulci effacement, tight ventricles and unclear grey-white differentiation, No hemorrhage or trauma. Neurology consulted for initial assessment to neuroprognostication. Neuroimaging is consistent w/ anoxic brain injury 2/2 cardiac arrest. On Exam comatose vs propofol sedation, febrile, PVD otherwise absent BSR. At this time prognosis remained guarded while sedated. This will be a baseline neurologic assessment given the acuity of presentation and will re-examine 72 post arrest, off sedation and correction of her electrolyte abnormality for neuroprognostication.       Post cardiac arrest  Transaminitis - shock vs MTX s/e  Hyperammonemia   Pos Cocaine drugs tox        Recommendations  Neuro and Vitals assessment per MICU  monitor clinically for signs of seizure  wean off propofol when able   continuous vEEG monitoring  repeat CTH 72hrs  f/u NSE  maintain seizure and aspiration risk precaution  correct electrolyte imbalance

## 2024-05-20 NOTE — CONSULT NOTE ADULT - SUBJECTIVE AND OBJECTIVE BOX
[facility] Geriatrics and Palliative Care  Arnav Dos Santos, Palliative Care Attending  Contact Info: Call [phone] (HEAL Line) or message on [employer] Teams (Arnav Dos Santos)    HPI:  Patient is a 43 yo F with PMHx of HTN, HLD, RA, chronic GERD, hemorrhoids, chronic MARCELL, anxiety/MDD, bipolar with paula, lupus, and fibromyalgia presenting to [facility] ED after cardiac arrest. Patient was found down, apneic and pulseless at [facility] with drug paraphernalia (crack pipe, needles) around her. Unclear how long patient was down for. Given intranasal narcan at the scene without any changes. 15 minutes of ACLS was performed in the field w/ ROSC. Received 2 of epi, and was intubate. She because hypotensive enroute and was given a third dose of epi. She arrived in the ED and pulse was lost and again one more round of compressions performed w/ ROSC. Patient was started on pressor support.     Of note, she was seen at [facility] 3 days ago 5/17 for RLQ pain. LMP 5/6.   Spoke with daughter who lives in [place]. She is flying in from [place]     In the ED:  Initial vital signs: T: 97.3F, HR: 73, BP:76/40 R:  SpO2: 91% on BVM  Labs:  Hgb 10.6; pH 6.99; lactate 7.2; Na 132; HCO3 17; ; ; UTox   Imaging:   Interventions: 1L LR; Levophed; 50meq bicarb x2 and bicarb drip @150mL/hr   (19 May 2024 23:48)    Patient seen and examined at bedside. Appears overtly comfortable. Intubated and encephalopathic, unable to participate in interview. Comprehensive symptom assessment and GOC exploration as noted below. Further collateral obtained from primary team, chart, and family.    PERTINENT PM/SXH:       FAMILY HISTORY:    No history of  in first degree relatives  Unable to obtain due to encephalopathy    ITEMS NOT CHECKED ARE NOT PRESENT  SOCIAL HISTORY:   Significant other/partner:  []  Children:  []  Substance hx:  []   Tobacco hx:  []   Alcohol hx: []   Home Opioid hx:  [] I-Stop Reference No:  - no active Rx's / see chart note  Living Situation: []Home  []Long term care  []Rehab []Other  Mormon/Spiritual practice: ; Role of organized Jainism [] important [] some [] unable to assess  Coping: [] well [] with difficulty [] poor coping [] unable to assess  Support system: [] strong [] adequate [] inadequate    ADVANCE DIRECTIVES:    []MOLST  []Living Will  DECISION MAKER(s):  [] Health Care Proxy(s)  [] Surrogate(s)  [] Guardian           Name(s)/Phone Number(s):     BASELINE (I)ADLs (prior to admission):  [place]: []Total  [] Moderate []Dependent    ALLERGIES:  No Known Allergies    MEDICATIONS  (STANDING):  acetaminophen   IVPB .. 1000 milliGRAM(s) IV Intermittent once  chlorhexidine 0.12% Liquid 15 milliLiter(s) Oral Mucosa every 12 hours  chlorhexidine 2% Cloths 1 Application(s) Topical <User Schedule>  enoxaparin Injectable 40 milliGRAM(s) SubCutaneous every 24 hours  lactulose Syrup 20 Gram(s) Oral every 6 hours  pantoprazole  Injectable 40 milliGRAM(s) IV Push daily  piperacillin/tazobactam IVPB.. 3.375 Gram(s) IV Intermittent every 8 hours  rifAXIMin 550 milliGRAM(s) Oral two times a day  sodium chloride 3%. 500 milliLiter(s) (30 mL/Hr) IV Continuous <Continuous>  vancomycin  IVPB 1500 milliGRAM(s) IV Intermittent every 12 hours    MEDICATIONS  (PRN):    Analgesic Use (Scheduled and PRNs) for past 24 hours:  acetaminophen   IVPB ..   400 mL/Hr IV Intermittent (05-20-24 @ 09:56)    acetaminophen   IVPB ..   400 mL/Hr IV Intermittent (05-20-24 @ 03:17)    propofol Infusion   2.84 mL/Hr IV Continuous (05-20-24 @ 02:43)    propofol Infusion   9.6 mL/Hr IV Continuous (05-19-24 @ 23:44)      PRESENT SYMPTOMS: []Unable to obtain due to poor mentation/encephalopathy  Source if other than patient:  []Family   []Team     Pain: [] yes [] no  QOL impact -   Location -                    Aggravating Factors -  Quality -  Radiation -  Timing -  Severity (0-10 scale) -   Minimal Acceptable Level (0-10 scale) -    CPOT Score:  (Critical Care Pain Assessment)    PAIN AD Score:  (Nonverbal Pain Assessment)    Dyspnea:                           []Mild  []Moderate []Severe  Anxiety:                             []Mild []Moderate []Severe  Fatigue:                             []Mild []Moderate []Severe  Nausea:                             []Mild []Moderate []Severe  Loss of Appetite:              []Mild []Moderate []Severe  Constipation:                    []Mild []Moderate []Severe    Other Symptoms:  [x]All other review of systems negative     Palliative Performance Status Version 2:  %  (Functional Assessment Tool)    GENERAL:  [] NAD []Alert []Lethargic  []Cachexia  []Unarousable  []Verbal  []Non-Verbal  BEHAVIORAL:   []Anxiety  []Delirium []Agitation []Cooperative []Oriented x  HEENT:  []Normal  [] Moist Mucous Membranes []Dry mouth   []ET Tube/Trach  []Oral lesions  PULMONARY:   []Clear []Tachypnea  []Audible excessive secretions  []Normal Work of Breathing []Labored Breathing  []Rhonchi []Crackles []Wheezing  CARDIOVASCULAR:    []Regular Rate []Regular Rhythm []Irregular []Tachy  []Kenji  GASTROINTESTINAL:  []Soft  []Distended   []+BS  []Non tender []Tender  []PEG []OGT/ NGT  Last BM:  GENITOURINARY:  []Normal [] Incontinent   []Oliguria/Anuria   []Stringer  MUSCULOSKELETAL:   []Normal Extremities  []Weakness  []Bed/Wheelchair bound []Edema  NEUROLOGIC:   []No focal deficits  []Cognitive impairment  []Dysphagia []Dysarthria []Paresis []Encephalopathic  SKIN:   []Normal   []Pressure ulcer(s)  []Rash    CRITICAL CARE:  [ ]Shock Present  [ ]Septic [ ]Cardiogenic [ ]Neurologic [ ]Hypovolemic  [ ] Vasopressors [ ]Inotropes   [ ]Respiratory failure present [ ]Mechanical Ventilation [ ]Non-invasive ventilatory support [ ]High-Flow  [ ]Acute  [ ]Chronic [ ]Hypoxic  [ ]Hypercarbic   [ ]Other organ failure    Vital Signs Last 24 Hrs  T(C): 39.4 (20 May 2024 13:54), Max: 40.6 (20 May 2024 11:00)  T(F): 103 (20 May 2024 13:54), Max: 105 (20 May 2024 11:00)  HR: 107 (20 May 2024 15:00) (73 - 121)  BP: 105/56 (20 May 2024 15:00) (74/45 - 210/96)  BP(mean): 76 (20 May 2024 15:00) (55 - 151)  RR: 16 (20 May 2024 15:00) (11 - 26)  SpO2: 100% (20 May 2024 15:00) (91% - 100%)    Parameters below as of 20 May 2024 15:00  Patient On (Oxygen Delivery Method): ventilator    O2 Concentration (%): 30     LABS: Personally reviewed and interpreted                        14.8   17.03 )-----------( 412      ( 20 May 2024 06:22 )             47.8   05-20    138  |  106  |  28<H>  ----------------------------<  159<H>  3.9   |  17<L>  |  1.11    Ca    7.9<L>      20 May 2024 08:55  Phos  2.4     05-20  Mg     2.0     05-20    TPro  7.5  /  Alb  4.1  /  TBili  0.2  /  DBili  x   /  AST  583<H>  /  ALT  446<H>  /  AlkPhos  99  05-20    RADIOLOGY & ADDITIONAL STUDIES: Personally reviewed and interpreted    REFERRALS:  [x]Social Work/Case management []PT/OT []Chaplaincy  []Hospice  []Patient/Family Support []Massage Therapy []Music Therapy []Holistic RN []Ethics    DISCUSSION OF CASE: Family - to provide updates and emotional support; Primary Team/RN - to discuss plan of care [facility] Geriatrics and Palliative Care  Arnav Dos Santos, Palliative Care Attending  Contact Info: Call [phone] (HEAL Line) or message on [employer] Teams (Arnav Dos Santos)    HPI:  Patient is a 45 yo F with PMHx of HTN, HLD, RA, chronic GERD, hemorrhoids, chronic MARCELL, anxiety/MDD, bipolar with paula, lupus, and fibromyalgia presenting to [facility] ED after cardiac arrest. Patient was found down, apneic and pulseless at [facility] with drug paraphernalia (crack pipe, needles) around her. Unclear how long patient was down for. Given intranasal narcan at the scene without any changes. 15 minutes of ACLS was performed in the field w/ ROSC. Received 2 of epi, and was intubate. She because hypotensive enroute and was given a third dose of epi. She arrived in the ED and pulse was lost and again one more round of compressions performed w/ ROSC. Patient was started on pressor support.     Patient seen and examined at bedside. Appears overtly comfortable. Intubated and encephalopathic, unable to participate in interview. Comprehensive symptom assessment and GOC exploration as noted below. Further collateral obtained from primary team, chart, and family.    PERTINENT PM/SXH:   HTN  HLD  RA  Substance Use Disorder    FAMILY HISTORY:  Unable to obtain due to encephalopathy    ITEMS NOT CHECKED ARE NOT PRESENT  SOCIAL HISTORY:   Significant other/partner:  []  Children:  [x]  Substance hx:  [x]   Tobacco hx:  []   Alcohol hx: []   Home Opioid hx:  [] I-Stop Reference No:  - no active Rx's  Living Situation: []Home  []Long term care  []Rehab [x]Other: Shelter  Restorationist/Spiritual practice: ; Role of organized Mormonism [] important [] some [x] unable to assess  Coping: [] well [] with difficulty [] poor coping [x] unable to assess  Support system: [] strong [x] adequate [] inadequate    ADVANCE DIRECTIVES:    []MOLST  []Living Will  DECISION MAKER(s):  [] Health Care Proxy(s)  [x] Surrogate(s)  [] Guardian           Name(s)/Phone Number(s): Daughter    BASELINE (I)ADLs (prior to admission):  [place]: [x]Total  [] Moderate []Dependent    ALLERGIES:  No Known Allergies    MEDICATIONS  (STANDING):  acetaminophen   IVPB .. 1000 milliGRAM(s) IV Intermittent once  chlorhexidine 0.12% Liquid 15 milliLiter(s) Oral Mucosa every 12 hours  chlorhexidine 2% Cloths 1 Application(s) Topical <User Schedule>  enoxaparin Injectable 40 milliGRAM(s) SubCutaneous every 24 hours  lactulose Syrup 20 Gram(s) Oral every 6 hours  pantoprazole  Injectable 40 milliGRAM(s) IV Push daily  piperacillin/tazobactam IVPB.. 3.375 Gram(s) IV Intermittent every 8 hours  rifAXIMin 550 milliGRAM(s) Oral two times a day  sodium chloride 3%. 500 milliLiter(s) (30 mL/Hr) IV Continuous <Continuous>  vancomycin  IVPB 1500 milliGRAM(s) IV Intermittent every 12 hours    MEDICATIONS  (PRN):    Analgesic Use (Scheduled and PRNs) for past 24 hours:  acetaminophen   IVPB ..   400 mL/Hr IV Intermittent (05-20-24 @ 09:56)  acetaminophen   IVPB ..   400 mL/Hr IV Intermittent (05-20-24 @ 03:17)  propofol Infusion   2.84 mL/Hr IV Continuous (05-20-24 @ 02:43)  propofol Infusion   9.6 mL/Hr IV Continuous (05-19-24 @ 23:44)    PRESENT SYMPTOMS: [x]Unable to obtain due to poor mentation/encephalopathy  Source if other than patient:  []Family   []Team     Pain: [] yes [x] no  QOL impact -  Location -  Aggravating factors -  Quality -  Radiation -  Timing -  Severity (0-10 scale) -  Minimal acceptable level (0-10 scale) -    PAINAD Score: 0  CPOT Score: 0    Dyspnea:                           []Mild  []Moderate []Severe  Anxiety:                             []Mild []Moderate []Severe  Fatigue:                             []Mild []Moderate []Severe  Nausea:                             []Mild []Moderate []Severe  Loss of appetite:              []Mild []Moderate []Severe  Constipation:                    []Mild []Moderate []Severe    Other Symptoms:  [x]All other review of systems negative     Palliative Performance Status Version 2: 10%    GENERAL:  [] NAD []Alert [x]Lethargic  []Cachexia  [x]Unarousable  []Verbal  [x]Non-Verbal  BEHAVIORAL:   []Anxiety  [x]Delirium []Agitation []Cooperative []Oriented x  HEENT:  []Normal  [] Moist Mucous Membranes []Dry mouth   [x]ET Tube/Trach  []Oral lesions  PULMONARY:   []Clear []Tachypnea  []Audible excessive secretions  []Normal Work of Breathing []Labored Breathing  [x]Rhonchi []Crackles []Wheezing  CARDIOVASCULAR:    [x]Regular Rate [x]Regular Rhythm []Irregular []Tachy  []Kenji  GASTROINTESTINAL:  [x]Soft  [x]Distended   [x]+BS  [x]Non tender []Tender  []PEG [x]OGT/ NGT  Last BM:  GENITOURINARY:  []Normal [] Incontinent   []Oliguria/Anuria   [x]Stringer  MUSCULOSKELETAL:   []Normal Extremities  [x]Weakness  [x]Bed/Wheelchair bound []Edema  NEUROLOGIC:   []No focal deficits  []Cognitive impairment  [x]Dysphagia []Dysarthria []Paresis [x]Encephalopathic  SKIN:   [x]Normal   []Pressure ulcer(s)  []Rash    CRITICAL CARE:  []Shock Present  [ ]Septic [ ]Cardiogenic [ ]Neurologic [ ]Hypovolemic  [] Vasopressors [ ]Inotropes   [x]Respiratory failure present [x]Mechanical Ventilation [ ]Non-invasive ventilatory support [ ]High-Flow  [x]Acute  [ ]Chronic [x]Hypoxic  [ ]Hypercarbic   [ ]Other organ failure    Vital Signs Last 24 Hrs  T(C): 39.4 (20 May 2024 13:54), Max: 40.6 (20 May 2024 11:00)  T(F): 103 (20 May 2024 13:54), Max: 105 (20 May 2024 11:00)  HR: 107 (20 May 2024 15:00) (73 - 121)  BP: 105/56 (20 May 2024 15:00) (74/45 - 210/96)  BP(mean): 76 (20 May 2024 15:00) (55 - 151)  RR: 16 (20 May 2024 15:00) (11 - 26)  SpO2: 100% (20 May 2024 15:00) (91% - 100%)    Parameters below as of 20 May 2024 15:00  Patient On (Oxygen Delivery Method): ventilator  O2 Concentration (%): 30     LABS: Personally reviewed and interpreted                      14.8   17.03 )-----------( 412      ( 20 May 2024 06:22 )             47.8   05-20    138  |  106  |  28<H>  ----------------------------<  159<H>  3.9   |  17<L>  |  1.11    Ca    7.9<L>      20 May 2024 08:55  Phos  2.4     05-20  Mg     2.0     05-20  TPro  7.5  /  Alb  4.1  /  TBili  0.2  /  DBili  x   /  AST  583<H>  /  ALT  446<H>  /  AlkPhos  99  05-20    RADIOLOGY & ADDITIONAL STUDIES: Personally reviewed and interpreted  < from: CT Head No Cont (05.19.24 @ 23:45) >  VENTRICLES AND SULCI: There is effacement of the cerebral sulci,   ventricles and cisternal spaces secondary to cerebral edema.  INTRA-AXIAL: No intracranial mass, acute hemorrhage, or midline shift is   present. There is decreased gray-white matter differentiation.  EXTRA-AXIAL: No extra-axial fluid collection is present.  VISUALIZED SINUSES: No air-fluid levels areidentified. Scattered mucosal   thickening.  VISUALIZED MASTOIDS:  Clear.  CALVARIUM:  Normal.  MISCELLANEOUS:  Endotracheal tube with secretions in the nasopharynx. Empty sella.  IMPRESSION: Findings consistent with anoxic brain injury. No intracranial hemorrhage or calvarial fracture.    REFERRALS:  [x]Social Work/Case management []PT/OT []Chaplaincy  []Hospice  []Patient/Family Support []Massage Therapy []Music Therapy []Holistic RN []Ethics    DISCUSSION OF CASE: Daughter - to provide updates and emotional support; Primary Team/RN - to discuss plan of care

## 2024-05-20 NOTE — CONSULT NOTE ADULT - PROBLEM SELECTOR RECOMMENDATION 6
.  Complex medical decision making / symptom management in the setting of critical illness.    Will continue to follow for ongoing GOC discussion / titration of palliative regimen. Emotional support provided, questions answered.  Active Psychosocial Referrals:  [x]Social Work/Case management []PT/OT []Chaplaincy []Hospice  []Patient/Family Support []Holistic RN []Massage Therapy []Music Therapy []Ethics  Coping: [] well [] with difficulty [] poor coping [x] unable to assess  Support system: [] strong [x] adequate [] inadequate    For new or uncontrolled symptoms, please call Palliative Care at [phone]). The service is available 24/7 (including nights & weekends) to provide symptom management recommendations over the phone as appropriate

## 2024-05-20 NOTE — CONSULT NOTE ADULT - NS ATTEST RISK PROBLEM GEN_ALL_CORE FT
Acute Illness That Poses A Threat To Life  Abrupt Change In Neurological Status  Prescription Of Parenteral Controlled Substances

## 2024-05-20 NOTE — ED ADULT NURSE REASSESSMENT NOTE - NS ED NURSE REASSESS COMMENT FT1
per ems pt arrived post rosc intubated. was coded for 15 min, given 2 amps of epi by ems  pt transferred onto ER stretcher. 2148 no carotid pulses palpated. cpr started immediately. pt also given 1 amp epi at 2148  pulse check at 2150, strong carotid pulses   cpr stopped  pt bps post rosc 60s/30s. levo started at 1mcg/kg at 2205 w bp improvement  2 amps bicarb given at 2217 and 2218  levo stopped at 2330

## 2024-05-21 NOTE — PATIENT PROFILE ADULT - FUNCTIONAL ASSESSMENT - DAILY ACTIVITY 2.
unable to assess, pt unresponsive unable to assess, pt unresponsive/4 = No assist / stand by assistance

## 2024-05-21 NOTE — DIETITIAN INITIAL EVALUATION ADULT - ADD RECOMMEND
-Align nutrition with goals of care at all times  -If unable to extubate and within goals of care, recommend establish access and initiate enteral nutrition support within 24-48 hrs   *Recommend Jevity 1.5 with goal rate of 56 ml/hr with LPS x2/day from [id number] to provide 1008 ml tube feed, 1712 calories, 94 gProt., and 766 ml free water. This is 22.6 nonprotein calories and 1.59 gProt. per kg ideal body weight 59.1 kg.   -Monitor pressor and propofol demands  -Weekly wts  -Monitor chemistry, GI function, and skin integrity

## 2024-05-21 NOTE — PATIENT PROFILE ADULT - INFORMATION COULD NOT BE OBTAINED DETAILS
unable to assess, pt unresponsive conducted a detailed discussion... I had a detailed discussion with the patient and/or guardian regarding the historical points, exam findings, and any diagnostic results supporting the discharge/admit diagnosis.

## 2024-05-21 NOTE — PROGRESS NOTE ADULT - TIME BILLING
Emotional Support/Supportive Care and Clarification of Potential Disease Trajectory related to Cardiac Arrest  Assessment of Symptom Lee and Palliative regimen  Education and Monitoring of Opiates including titration and management of high risk medications  Exploration of GOC/Advanced Directives including HCP designation, code status, and hospice eligibility    Time inclusive of chart review, medication ordering, discussion with primary team, clinical documentation, and communication with family/caregiver

## 2024-05-21 NOTE — EEG REPORT - NS EEG TEXT BOX
[facility] Department of Neurology  Inpatient Continuous video-Electroencephalography Report    Acquisition Details:  Electroencephalography was acquired using a minimum of 21 channels on an [employer] system v 9.3.1 with electrode placement according to the standard International 10-20 system following ACNS (American Clinical Neurophysiology Society) guidelines for Long-Term Video EEG monitoring.  Anterior temporal T1 and T2 electrodes were utilized whenever possible.   The [employer] automated spike & seizure detections were all reviewed in detail, in addition to extensive portions of raw EEG.      Daily Updates (from 07:00 am until 07:00 am):  Day 2  May 20-21:   Background:  suppressed (completely <10 uV)  Voltage:   Suppressed (all <10 uV)  Organization:   Absent  Symmetry/Focality: Continuous (90+%)   possible right and left posterior temporal 0-5mV fast alpha/beta activity of cerebral origin, versus focal muscle activity.  Posterior Dominant Rhythm:  No clear PDR     Sleep:  Absent.  Variability:   No		Reactivity:  Unknown (stimulation periods not identified)    Spontaneous Activity:  No epileptiform discharges   Events:  •	No electrographic seizures or significant clinical events occurred during this study.  Provocations:  •	Hyperventilation: was not performed.  •	Photic stimulation: was not performed.  Daily Summary:    •	There was suppression of cerebral activity, with only a possibility of cerebral activity seen over both temporal regions, though could also be muscle artifact.   The invariant background is an indicator of severe cerebral dysfunction.  Heavily sedating medications can contribute to lack of variability and diffuse attenuation.  The attenuation suggests cortical dysfunction or alternatively extra-cerebral absorption of electrical activity such as fluid collections.  •	There were no findings of active epilepsy, however this alone does not rule out the diagnosis.         Lamin Corrales MD  Attending Neurologist, [facility]

## 2024-05-21 NOTE — PROGRESS NOTE ADULT - SUBJECTIVE AND OBJECTIVE BOX
INTERVAL HPI/OVERNIGHT EVENTS: gave 1 L NS for map in 70s, 9PM Na 147 gave Kcl 10x3, positive mrsa nares, 2nd bolus given. Spoke with Sister Kristyn to update her on the plan. ordered vanc trough for 5/22 2am. Acidotic to 7.2hyper chloremic to 119 stopped NaCl and agrressively gave K as well as mag. GPC in clusters in aerobic bottle    SUBJECTIVE: Patient seen and examined at bedside.    VITAL SIGNS:  ICU Vital Signs Last 24 Hrs  T(C): 35.5 (21 May 2024 10:22), Max: 39.4 (20 May 2024 13:54)  T(F): 95.9 (21 May 2024 10:22), Max: 103 (20 May 2024 13:54)  HR: 125 (21 May 2024 13:00) (97 - 125)  BP: 133/63 (21 May 2024 13:00) (89/56 - 133/63)  BP(mean): 91 (21 May 2024 13:00) (66 - 93)  ABP: --  ABP(mean): --  RR: 19 (21 May 2024 13:00) (14 - 26)  SpO2: 100% (21 May 2024 13:00) (95% - 100%)    O2 Parameters below as of 21 May 2024 13:00  Patient On (Oxygen Delivery Method): ventilator    O2 Concentration (%): 30    Mode: AC/ CMV (Assist Control/ Continuous Mandatory Ventilation), RR (machine): 18, TV (machine): 420, FiO2: 30, PEEP: 5, ITime: 0.8, MAP: 8.9, PIP: 21    05-20 @ 07:01 - 05-21 @ 07:00  --------------------------------------------------------  IN: 6445 mL / OUT: 4811 mL / NET: 1634 mL    05-21 @ 07:01  -  05-21 @ 13:10  --------------------------------------------------------  IN: 195 mL / OUT: 1250 mL / NET: -1055 mL    CAPILLARY BLOOD GLUCOSE    PHYSICAL EXAM:  Constitutional: intubated, obtunded  HEENT: OU 7mm round, fixed, non reactive to light, no deviation, negative cranial 2-3 nerve reflex, no oculocephalic reflex, no cough reflex  Neck: supple, no JVD, negative trapezius squeeze  Cardiovascular: +S1/S2, RRR  Respiratory: ctab  Gastrointestinal: abdomen distended, tense, tympanic  Extremities: cool and moist, no track marks, no wounds  Vascular: 2+ radial, DP/PT and femoral pulses B/L  Dermatologic: normal color and turgor; no visible rashes  Neurological: no withdrawal to pain or posturing    MEDICATIONS:  MEDICATIONS  (STANDING):  chlorhexidine 0.12% Liquid 15 milliLiter(s) Oral Mucosa every 12 hours  chlorhexidine 2% Cloths 1 Application(s) Topical <User Schedule>  enoxaparin Injectable 40 milliGRAM(s) SubCutaneous every 24 hours  pantoprazole  Injectable 40 milliGRAM(s) IV Push daily  rifAXIMin 550 milliGRAM(s) Oral two times a day  sodium chloride 3%. 500 milliLiter(s) (40 mL/Hr) IV Continuous <Continuous>  vancomycin  IVPB 1500 milliGRAM(s) IV Intermittent every 12 hours    MEDICATIONS  (PRN):  fentaNYL    Injectable 50 MICROGram(s) IV Push every 2 hours PRN severe pain/vent dyssynchrony      LABS:                        13.1   17.33 )-----------( 248      ( 21 May 2024 05:30 )             43.6     05-21    148<H>  |  120<H>  |  28<H>  ----------------------------<  171<H>  3.6   |  18<L>  |  1.07    Ca    7.5<L>      21 May 2024 05:30  Phos  3.9     05-21  Mg     2.8     05-21    TPro  6.0  /  Alb  3.2<L>  /  TBili  0.3  /  DBili  x   /  AST  408<H>  /  ALT  274<H>  /  AlkPhos  78  05-21    PT/INR - ( 19 May 2024 21:54 )   PT: 9.8 sec;   INR: 0.86       PTT - ( 19 May 2024 21:54 )  PTT:29.1 sec  Urinalysis Basic - ( 21 May 2024 05:30 )    Color: x / Appearance: x / SG: x / pH: x  Gluc: 171 mg/dL / Ketone: x  / Bili: x / Urobili: x   Blood: x / Protein: x / Nitrite: x   Leuk Esterase: x / RBC: x / WBC x   Sq Epi: x / Non Sq Epi: x / Bacteria: x    RADIOLOGY & ADDITIONAL TESTS: Reviewed.

## 2024-05-21 NOTE — DIETITIAN INITIAL EVALUATION ADULT - PERTINENT LABORATORY DATA
05-21    148<H>  |  120<H>  |  28<H>  ----------------------------<  171<H>  3.6   |  18<L>  |  1.07    Ca    7.5<L>      21 May 2024 05:30  Phos  3.9     05-21  Mg     2.8     05-21    TPro  6.0  /  Alb  3.2<L>  /  TBili  0.3  /  DBili  x   /  AST  408<H>  /  ALT  274<H>  /  AlkPhos  78  05-21

## 2024-05-21 NOTE — DIETITIAN INITIAL EVALUATION ADULT - OTHER INFO
44 pmhx of bipolar (on lithium), htn (hctz), HLD, RA, GERD, hemorrhoids, MARCELL, lupus and fibromyalgia found to be in cardiac arrest possible 2/2 drug overdose (cocaine w/ possibly laced with fentanyl) admitted to the micu for post resuscitation care.    Pt care discussed in interdisciplinary care team rounds. Rx and labs reviewed. Pt intubated and sedated at time of assessment; vent to VC-AC, MAP 91, no pressors, and no propofol at this time. Ongoing goals of care discussions; no plans to feed at this time. Goal for permissive hypernatremia in setting of anoxic brain injury, continues on hypertonic saline at time of assessment. Neurology/neurosurgery, palliative following. No reports nausea/vomiting/constipation/diarrhea. NKA to food per chart review. RDN will continue to reassess, intervene, and monitor as appropriate.     Pain: No non-verbal indicators   GI: Abdomen non-distended/non-tender, +BS x4, last bowel movement 5/21 -watery   Skin: Warm/Dry/Intact, +1 generalized edema

## 2024-05-21 NOTE — PROGRESS NOTE ADULT - SUBJECTIVE AND OBJECTIVE BOX
[facility] Geriatrics and Palliative Care  Arnav Dos Santos, Palliative Care Attending  Contact Info: Call [phone] (HEAL Line) or message on Microsoft Teams (Arnav Dos Santos)    SUBJECTIVE AND OBJECTIVE:  INTERVAL HPI/OVERNIGHT EVENTS: Interval events noted. See patient's PRN use for the past 24hrs noted below. Comprehensive symptom assessment and GOC exploration as noted below. Extensive time spent discussing plan of care with family.    ALLERGIES:  No Known Allergies    MEDICATIONS  (STANDING):  cefTRIAXone   IVPB 2000 milliGRAM(s) IV Intermittent every 24 hours  chlorhexidine 0.12% Liquid 15 milliLiter(s) Oral Mucosa every 12 hours  chlorhexidine 2% Cloths 1 Application(s) Topical <User Schedule>  enoxaparin Injectable 40 milliGRAM(s) SubCutaneous every 24 hours  pantoprazole  Injectable 40 milliGRAM(s) IV Push daily  rifAXIMin 550 milliGRAM(s) Oral two times a day    MEDICATIONS  (PRN):  fentaNYL    Injectable 50 MICROGram(s) IV Push every 2 hours PRN severe pain/vent dyssynchrony      Analgesic Use (Scheduled and PRNs) for past 24 hours:  acetaminophen   IVPB ..   400 mL/Hr IV Intermittent (05-21-24 @ 01:16)      ITEMS UNCHECKED ARE NOT PRESENT  PRESENT SYMPTOMS/REVIEW OF SYSTEMS: []Unable to obtain due to poor mentation   Source if other than patient:  []Family   []Team         Vital Signs Last 24 Hrs  T(C): 36.9 (21 May 2024 17:39), Max: 38.8 (20 May 2024 21:33)  T(F): 98.4 (21 May 2024 17:39), Max: 101.9 (20 May 2024 21:33)  HR: 130 (21 May 2024 19:00) (97 - 130)  BP: 118/56 (21 May 2024 15:00) (89/56 - 133/63)  BP(mean): 81 (21 May 2024 15:00) (67 - 93)  RR: 20 (21 May 2024 19:00) (14 - 26)  SpO2: 98% (21 May 2024 19:00) (95% - 100%)    Parameters below as of 21 May 2024 20:00  Patient On (Oxygen Delivery Method): ventilator    O2 Concentration (%): 30    LABS: Personally reviewed and interpreted                        12.1   14.79 )-----------( 231      ( 21 May 2024 16:08 )             38.8   05-21    160<HH>  |  130<H>  |  16  ----------------------------<  138<H>  4.2   |  24  |  0.80    Ca    7.5<L>      21 May 2024 18:36  Phos  2.4     05-21  Mg     2.6     05-21    TPro  5.6<L>  /  Alb  3.1<L>  /  TBili  0.4  /  DBili  x   /  AST  425<H>  /  ALT  262<H>  /  AlkPhos  71  05-21      RADIOLOGY & ADDITIONAL STUDIES: Personally reviewed and interpreted  None new    DISCUSSION OF CASE: Family - to provide updates and emotional support; Primary Team/RN - to discuss plan of care [facility] Geriatrics and Palliative Care  Arnav Dos Santos, Palliative Care Attending  Contact Info: Call [phone] (HEAL Line) or message on Microsoft Teams (Arnav Dos Santos)    SUBJECTIVE AND OBJECTIVE:  INTERVAL HPI/OVERNIGHT EVENTS: Interval events noted. Remains unresponsive off sedation. No improvement in neuro exam. Does not appear uncomfortable, still triggering vent. See patient's PRN use for the past 24hrs noted below. Comprehensive symptom assessment and GOC exploration as noted below. Extensive time spent discussing plan of care with family.    ALLERGIES:  No Known Allergies    MEDICATIONS  (STANDING):  cefTRIAXone   IVPB 2000 milliGRAM(s) IV Intermittent every 24 hours  chlorhexidine 0.12% Liquid 15 milliLiter(s) Oral Mucosa every 12 hours  chlorhexidine 2% Cloths 1 Application(s) Topical <User Schedule>  enoxaparin Injectable 40 milliGRAM(s) SubCutaneous every 24 hours  pantoprazole  Injectable 40 milliGRAM(s) IV Push daily  rifAXIMin 550 milliGRAM(s) Oral two times a day    MEDICATIONS  (PRN):  fentaNYL    Injectable 50 MICROGram(s) IV Push every 2 hours PRN severe pain/vent dyssynchrony      Analgesic Use (Scheduled and PRNs) for past 24 hours:  acetaminophen   IVPB ..   400 mL/Hr IV Intermittent (05-21-24 @ 01:16)    ITEMS UNCHECKED ARE NOT PRESENT  PRESENT SYMPTOMS: [x]Unable to obtain due to poor mentation/encephalopathy  Source if other than patient:  []Family   []Team     Pain: [] yes [x] no  QOL impact -  Location -  Aggravating factors -  Quality -  Radiation -  Timing -  Severity (0-10 scale) -  Minimal acceptable level (0-10 scale) -    PAINAD Score: 0  CPOT Score: 0    Dyspnea:                           []Mild  []Moderate []Severe  Anxiety:                             []Mild []Moderate []Severe  Fatigue:                             []Mild []Moderate []Severe  Nausea:                             []Mild []Moderate []Severe  Loss of appetite:              []Mild []Moderate []Severe  Constipation:                    []Mild []Moderate []Severe    Other Symptoms:  [x]All other review of systems negative     Palliative Performance Status Version 2: 10%    GENERAL:  [] NAD []Alert [x]Lethargic  []Cachexia  [x]Unarousable  []Verbal  [x]Non-Verbal  BEHAVIORAL:   []Anxiety  [x]Delirium []Agitation []Cooperative []Oriented x  HEENT:  []Normal  [] Moist Mucous Membranes []Dry mouth   [x]ET Tube/Trach  []Oral lesions  PULMONARY:   []Clear []Tachypnea  []Audible excessive secretions  []Normal Work of Breathing []Labored Breathing  [x]Rhonchi []Crackles []Wheezing  CARDIOVASCULAR:    [x]Regular Rate [x]Regular Rhythm []Irregular []Tachy  []Kenji  GASTROINTESTINAL:  [x]Soft  [x]Distended   [x]+BS  [x]Non tender []Tender  []PEG [x]OGT/ NGT  Last BM:  GENITOURINARY:  []Normal [] Incontinent   []Oliguria/Anuria   [x]Stringer  MUSCULOSKELETAL:   []Normal Extremities  [x]Weakness  [x]Bed/Wheelchair bound []Edema  NEUROLOGIC:   []No focal deficits  []Cognitive impairment  [x]Dysphagia []Dysarthria []Paresis [x]Encephalopathic  SKIN:   [x]Normal   []Pressure ulcer(s)  []Rash    CRITICAL CARE:  []Shock Present  [ ]Septic [ ]Cardiogenic [ ]Neurologic [ ]Hypovolemic  [] Vasopressors [ ]Inotropes   [x]Respiratory failure present [x]Mechanical Ventilation [ ]Non-invasive ventilatory support [ ]High-Flow  [x]Acute  [ ]Chronic [x]Hypoxic  [ ]Hypercarbic   [ ]Other organ failure    Vital Signs Last 24 Hrs  T(C): 36.9 (21 May 2024 17:39), Max: 38.8 (20 May 2024 21:33)  T(F): 98.4 (21 May 2024 17:39), Max: 101.9 (20 May 2024 21:33)  HR: 130 (21 May 2024 19:00) (97 - 130)  BP: 118/56 (21 May 2024 15:00) (89/56 - 133/63)  BP(mean): 81 (21 May 2024 15:00) (67 - 93)  RR: 20 (21 May 2024 19:00) (14 - 26)  SpO2: 98% (21 May 2024 19:00) (95% - 100%)    Parameters below as of 21 May 2024 20:00  Patient On (Oxygen Delivery Method): ventilator  O2 Concentration (%): 30    LABS: Personally reviewed and interpreted                      12.1   14.79 )-----------( 231      ( 21 May 2024 16:08 )             38.8   05-21    160<HH>  |  130<H>  |  16  ----------------------------<  138<H>  4.2   |  24  |  0.80    Ca    7.5<L>      21 May 2024 18:36  Phos  2.4     05-21  Mg     2.6     05-21  TPro  5.6<L>  /  Alb  3.1<L>  /  TBili  0.4  /  DBili  x   /  AST  425<H>  /  ALT  262<H>  /  AlkPhos  71  05-21    RADIOLOGY & ADDITIONAL STUDIES: Personally reviewed and interpreted  None new    DISCUSSION OF CASE: Daughter - to provide updates and emotional support; Primary Team/RN - to discuss plan of care

## 2024-05-21 NOTE — DIETITIAN INITIAL EVALUATION ADULT - OTHER CALCULATIONS
Needs determined using ideal body weight 59.1 kg (160%IBW) adjusted for critical illness, vented pt.

## 2024-05-21 NOTE — PROGRESS NOTE ADULT - ASSESSMENT
Assessment: 44 pmhx of bipolar (on lithium), htn (hctz), HLD, RA, GERD, hemorrhoids, MARCELL, lupus and fibromyalgia found to be in cardiac arrest possible 2/2 drug overdose (cocaine w/ possibly laced with fentanyl) admitted to the micu for post resuscitation care.    Neuro  #Anoxic brain injury 2/2 to cardiac arrest  UTox positive for cocaine. Briefly on pressor support and weaned.  CT Head - effacement of the cerebral sulci, ventricles and cisternal spaces secondary to cerebral edema. Findings consistent with anoxic brain injury. s/p 23% saline x1, and mannitol 100 x1  - neurology consulted f/u recs  - neurosurgery consulted f/u recs  - palliative consulted f/u recs  - anoxic brain injury, aim for permissive hypertension for adequate perfusion in the setting of unknown ICP, aim for MAP >80  - goal sodium 150-155 5/21, c/w hypertonic saline  - q4 BMP  - repeat ct head  - targeted temperature management, maintain normothermia (32 - 37.5C)  - vEEG  - f/u neuron specific enolase (May 20/21/22)    #Anxiety / Depression  #Bipolar 1 Diagnosis  On lithium 450mg in am and 450mg qpm. lithium level low at 0.26. On hctz. Lithium levels should be elevated iso of hctz use, however level indicates underdosing. Unknown last change in regimen. Last refilled 4/25/24. Psychiatrist at [facility].  - hold home med    #Sedated(resolved)  #Intubated  - off sedation    #C/f hepatic encephalopathy (resolved)  - d/yordan lactulose 20g q6  - c/w rifaximin 550 bid    Pulm  #Intubation  On ventilator. VBG consistent with respiratory acidosis initially. now with resolution  Current settings: FIO2 40; ; RR 18; PEEP 5  Cheyne dubois breathing noted  ABG with elevation in CO2 5/21  - increased RR to 18 from 14    Cards  #Cardiac Arrest  ACLS x2. ROSC obtained. pH on VBG 6.9 with pattern consistent with respiratory acidosis.   UTox positive for cocaine; however no signs of vasospasm > acute coronary syndrome with negative trops and non-ischemic EKG. High suspicion of fentanyl overdose leading to respiratory depression > hypercapnia > acidosis > cardiac arrest  - f/u fentanyl level  - f/u TTE  - Pacer pads in place   - follow cerebral edema/neuroprotective protocol outlined above     #HTN  home medications Losartan 100mg, HCTZ 25mg, Norvasc 6.25 BID   - holding home meds    #HLD  home medication Atorvastatin 40mg    - holding home Lipitor 40    GI  #Transaminitis  #C/f liver laceration  Likely shock liver i/s/o cardiac arrest. Distension on physical exam  AST/ALT ratio 1:1; serum alcohol negative. unlikely alcohol induced liver injury  CTAP: 5cm hypodense finding of liver representing irregular focal fatty infiltration vs hepatic laceration  - serial abdominal exams  - f/u hepatitis panel    #Ileus  likely 2/2 to fentanyl overdose. Found with drug paraphernalia nearby.  CT Abdomen -colon dilated to 8 cm with gas and semi solid/liquid stool. Stomach distended with gas and fluid.   - OG decompression  - rectal tube    #C/f bowel ischemia  Bowel ischemia iso of cocaine and hypoperfusion. Lactate 7.2, elevated iso of cardiac arrest. Lactate at 2.0  CTAP: cecum, ascending, and transverse colon dilated to 8 CM in caliber  - serial abdominal exams    #Chronic constipation  Home medications: Dulcolax and Miralax qd for constipation    #Chronic GERD  Home medication: omeprazole 40 qd    Renal  cristóbal    Rheum  #RA  Managed on Humira 40mg, MTX 2.5mg, and Naproxen PRN.    - hold Humira, MTX, leukovorin, and Naproxen    Endo  CRISTÓBAL    ID  #C/f staph epidermidis bacteremia  Blood culture revealing staph epidermidis,  - on vancomycin  - f/u surveillance blood culture    Heme  #Chronic MARCELL  Low HGB as of 08/2023, per outside records  Home medication: Ferrous Sulfate    PPX  E: Replete  N: NPO  GI: ppi qD  DVT ppx: lovenox  Dispo: MICU     FULL CODE     Assessment: 44 pmhx of bipolar (on lithium), htn (hctz), HLD, RA, GERD, hemorrhoids, MARCELL, lupus and fibromyalgia found to be in cardiac arrest possible 2/2 drug overdose (cocaine w/ possibly laced with fentanyl) admitted to the micu for post resuscitation care.    Neuro  #Anoxic brain injury 2/2 to cardiac arrest  UTox positive for cocaine. Briefly on pressor support and weaned.  CT Head - effacement of the cerebral sulci, ventricles and cisternal spaces secondary to cerebral edema. Findings consistent with anoxic brain injury. s/p 23% saline x1, and mannitol 100 x1  - neurology consulted f/u recs  - neurosurgery consulted f/u recs  - palliative consulted f/u recs  - anoxic brain injury, aim for permissive hypertension for adequate perfusion in the setting of unknown ICP, aim for MAP >80  - goal sodium 150-155 5/21, c/w hypertonic saline  - q4 BMP  - repeat ct head  - targeted temperature management, maintain normothermia (32 - 37.5C)  - vEEG  - f/u neuron specific enolase (May 20/21/22)    #Anxiety / Depression  #Bipolar 1 Diagnosis  On lithium 450mg in am and 450mg qpm. lithium level low at 0.26. On hctz. Lithium levels should be elevated iso of hctz use, however level indicates underdosing. Unknown last change in regimen. Last refilled 4/25/24. Psychiatrist at [facility].  - hold home med    #Sedated(resolved)  #Intubated  - off sedation    #C/f hepatic encephalopathy (resolved)  - d/yordan lactulose 20g q6  - c/w rifaximin 550 bid    Pulm  #Intubation  On ventilator. VBG consistent with respiratory acidosis initially. now with resolution  Current settings: FIO2 40; ; RR 18; PEEP 5  Cheyne dubois breathing noted  ABG with elevation in CO2 5/21  - increased RR to 18 from 14    Cards  #Cardiac Arrest  ACLS x2. ROSC obtained. pH on VBG 6.9 with pattern consistent with respiratory acidosis.   UTox positive for cocaine; however no signs of vasospasm > acute coronary syndrome with negative trops and non-ischemic EKG. High suspicion of fentanyl overdose leading to respiratory depression > hypercapnia > acidosis > cardiac arrest  - f/u fentanyl level  - f/u TTE  - Pacer pads in place   - follow cerebral edema/neuroprotective protocol outlined above     #HTN  home medications Losartan 100mg, HCTZ 25mg, Norvasc 6.25 BID   - holding home meds    #HLD  home medication Atorvastatin 40mg    - holding home Lipitor 40    GI  #Transaminitis  #C/f liver laceration  Likely shock liver i/s/o cardiac arrest. Distension on physical exam  AST/ALT ratio 1:1; serum alcohol negative. unlikely alcohol induced liver injury  CTAP: 5cm hypodense finding of liver representing irregular focal fatty infiltration vs hepatic laceration  - serial abdominal exams    #Ileus  likely 2/2 to fentanyl overdose. Found with drug paraphernalia nearby.  CT Abdomen -colon dilated to 8 cm with gas and semi solid/liquid stool. Stomach distended with gas and fluid.   - OG decompression  - rectal tube    #C/f bowel ischemia  Bowel ischemia iso of cocaine and hypoperfusion. Lactate 7.2, elevated iso of cardiac arrest. Lactate at 2.0  CTAP: cecum, ascending, and transverse colon dilated to 8 CM in caliber  - serial abdominal exams    #Chronic constipation  Home medications: Dulcolax and Miralax qd for constipation    #Chronic GERD  Home medication: omeprazole 40 qd    Renal  cristóbal    Rheum  #RA  Managed on Humira 40mg, MTX 2.5mg, and Naproxen PRN.    - hold Humira, MTX, leukovorin, and Naproxen    Endo  CRISTÓBAL    ID  #C/f staph epidermidis bacteremia  Blood culture revealing staph epidermidis,  - on vancomycin  - f/u surveillance blood culture    Heme  #Chronic MARCELL  Low HGB as of 08/2023, per outside records  Home medication: Ferrous Sulfate    PPX  E: Replete  N: NPO  GI: ppi qD  DVT ppx: lovenox  Dispo: MICU     FULL CODE     Assessment: 44 pmhx of bipolar (on lithium), htn (hctz), HLD, RA, GERD, hemorrhoids, MARCELL, lupus and fibromyalgia found to be in cardiac arrest possible 2/2 drug overdose (cocaine w/ possibly laced with fentanyl) admitted to the micu for post resuscitation care.    Neuro  #Anoxic brain injury 2/2 to cardiac arrest  UTox positive for cocaine. Briefly on pressor support and weaned.  CT Head - effacement of the cerebral sulci, ventricles and cisternal spaces secondary to cerebral edema. Findings consistent with anoxic brain injury. s/p 23% saline x1, and mannitol 100 x1  - neurology consulted f/u recs  - neurosurgery consulted f/u recs  - palliative consulted f/u recs  - anoxic brain injury, aim for permissive hypertension for adequate perfusion in the setting of unknown ICP, aim for MAP >80  - goal sodium 150-155 5/21, c/w hypertonic saline  - q4 BMP  - repeat ct head  - targeted temperature management, maintain normothermia (32 - 37.5C)  - vEEG  - f/u neuron specific enolase (May 20/21/22)    #Anxiety / Depression  #Bipolar 1 Diagnosis  On lithium 450mg in am and 450mg qpm. lithium level low at 0.26. On hctz. Lithium levels should be elevated iso of hctz use, however level indicates underdosing. Unknown last change in regimen. Last refilled 4/25/24. Psychiatrist at [facility].  - hold home med    #Sedated(resolved)  #Intubated  - off sedation    #C/f hepatic encephalopathy (resolved)  - d/yordan lactulose 20g q6  - c/w rifaximin 550 bid    Pulm  #Intubation  On ventilator. VBG consistent with respiratory acidosis initially. now with resolution  Current settings: FIO2 40; ; RR 18; PEEP 5  ABG with elevation in CO2 5/21  - increased RR to 18 from 14    Cards  #Cardiac Arrest  ACLS x2. ROSC obtained. pH on VBG 6.9 with pattern consistent with respiratory acidosis.   UTox positive for cocaine; however no signs of vasospasm > acute coronary syndrome with negative trops and non-ischemic EKG. High suspicion of fentanyl overdose leading to respiratory depression > hypercapnia > acidosis > cardiac arrest  - f/u fentanyl level  - f/u TTE  - Pacer pads in place   - follow cerebral edema/neuroprotective protocol outlined above     #HTN  home medications Losartan 100mg, HCTZ 25mg, Norvasc 6.25 BID   - holding home meds    #HLD  home medication Atorvastatin 40mg    - holding home Lipitor 40    GI  #Transaminitis  #C/f liver laceration  Likely shock liver i/s/o cardiac arrest. Distension on physical exam  AST/ALT ratio 1:1; serum alcohol negative. unlikely alcohol induced liver injury  CTAP: 5cm hypodense finding of liver representing irregular focal fatty infiltration vs hepatic laceration  - serial abdominal exams    #Ileus  likely 2/2 to fentanyl overdose. Found with drug paraphernalia nearby.  CT Abdomen -colon dilated to 8 cm with gas and semi solid/liquid stool. Stomach distended with gas and fluid.   - OG decompression  - rectal tube    #C/f bowel ischemia  Bowel ischemia iso of cocaine and hypoperfusion. Lactate 7.2, elevated iso of cardiac arrest. Lactate at 2.0  CTAP: cecum, ascending, and transverse colon dilated to 8 CM in caliber  - serial abdominal exams    #Chronic constipation  Home medications: Dulcolax and Miralax qd for constipation    #Chronic GERD  Home medication: omeprazole 40 qd    Renal  cristóbal    Rheum  #RA  Managed on Humira 40mg, MTX 2.5mg, and Naproxen PRN.    - hold Humira, MTX, leukovorin, and Naproxen    Endo  CRISTÓBAL    ID  #C/f staph epidermidis bacteremia  Blood culture revealing staph epidermidis,  - on vancomycin  - f/u surveillance blood culture    Heme  #Chronic MARCELL  Low HGB as of 08/2023, per outside records  Home medication: Ferrous Sulfate    PPX  E: Replete  N: NPO  GI: ppi qD  DVT ppx: lovenox  Dispo: MICU     FULL CODE     Assessment: 44 pmhx of bipolar (on lithium), htn (hctz), HLD, RA, GERD, hemorrhoids, MARCELL, lupus and fibromyalgia found to be in cardiac arrest possible 2/2 drug overdose (cocaine w/ possibly laced with fentanyl) admitted to the micu for post resuscitation care.    Neuro  #Anoxic brain injury 2/2 to cardiac arrest  UTox positive for cocaine. Briefly on pressor support and weaned.  CT Head - effacement of the cerebral sulci, ventricles and cisternal spaces secondary to cerebral edema. Findings consistent with anoxic brain injury. s/p 23% saline x1, and mannitol 100 x1  - neurology consulted f/u recs  - neurosurgery consulted f/u recs  - palliative consulted f/u recs  - anoxic brain injury, aim for permissive hypertension for adequate perfusion in the setting of unknown ICP, aim for MAP >80  - goal sodium 150-155 5/21, c/w hypertonic saline  - q4 BMP  - repeat ct head  - targeted temperature management, maintain normothermia (32 - 37.5C)  - vEEG  - f/u neuron specific enolase (May 20/21/22)    #Anxiety / Depression  #Bipolar 1 Diagnosis  On lithium 450mg in am and 450mg qpm. lithium level low at 0.26. On hctz. Lithium levels should be elevated iso of hctz use, however level indicates underdosing. Unknown last change in regimen. Last refilled 4/25/24. Psychiatrist at [facility].  - hold home med    #Sedated(resolved)  #Intubated  - off sedation    #C/f hepatic encephalopathy (resolved)  - d/yordan lactulose 20g q6  - c/w rifaximin 550 bid    Pulm  #Intubation  On ventilator. VBG consistent with respiratory acidosis initially. now with resolution  Current settings: FIO2 40; ; RR 18; PEEP 5  ABG with elevation in CO2 5/21  - increased RR to 18 from 14  - given sodium bicarb 150 meq x1    Cards  #Cardiac Arrest  ACLS x2. ROSC obtained. pH on VBG 6.9 with pattern consistent with respiratory acidosis.   UTox positive for cocaine; however no signs of vasospasm > acute coronary syndrome with negative trops and non-ischemic EKG. High suspicion of fentanyl overdose leading to respiratory depression > hypercapnia > acidosis > cardiac arrest  - f/u fentanyl level  - f/u TTE  - Pacer pads in place   - follow cerebral edema/neuroprotective protocol outlined above     #HTN  home medications Losartan 100mg, HCTZ 25mg, Norvasc 6.25 BID   - holding home meds    #HLD  home medication Atorvastatin 40mg    - holding home Lipitor 40    GI  #Transaminitis  #C/f liver laceration  Likely shock liver i/s/o cardiac arrest. Distension on physical exam  AST/ALT ratio 1:1; serum alcohol negative. unlikely alcohol induced liver injury  CTAP: 5cm hypodense finding of liver representing irregular focal fatty infiltration vs hepatic laceration  - serial abdominal exams    #Ileus  likely 2/2 to fentanyl overdose. Found with drug paraphernalia nearby.  CT Abdomen -colon dilated to 8 cm with gas and semi solid/liquid stool. Stomach distended with gas and fluid.   - OG decompression  - rectal tube    #C/f bowel ischemia  Bowel ischemia iso of cocaine and hypoperfusion. Lactate 7.2, elevated iso of cardiac arrest. Lactate at 2.0  CTAP: cecum, ascending, and transverse colon dilated to 8 CM in caliber  - serial abdominal exams    #Chronic constipation  Home medications: Dulcolax and Miralax qd for constipation    #Chronic GERD  Home medication: omeprazole 40 qd    Renal  cristóbal    Rheum  #RA  Managed on Humira 40mg, MTX 2.5mg, and Naproxen PRN.    - hold Humira, MTX, leukovorin, and Naproxen    Endo  CRISTÓBAL    ID  #C/f staph epidermidis bacteremia  Blood culture revealing staph epidermidis,  - on vancomycin  - f/u surveillance blood culture    Heme  #Chronic MARCELL  Low HGB as of 08/2023, per outside records  Home medication: Ferrous Sulfate    PPX  E: Replete  N: NPO  GI: ppi qD  DVT ppx: lovenox  Dispo: MICU     FULL CODE     Assessment: 44 pmhx of bipolar (on lithium), htn (hctz), HLD, RA, GERD, hemorrhoids, MARCELL, lupus and fibromyalgia found to be in cardiac arrest possible 2/2 drug overdose (cocaine w/ possibly laced with fentanyl) admitted to the micu for post resuscitation care.    Neuro  #Anoxic brain injury 2/2 to cardiac arrest  UTox positive for cocaine. Briefly on pressor support and weaned.  CT Head - effacement of the cerebral sulci, ventricles and cisternal spaces secondary to cerebral edema. Findings consistent with anoxic brain injury. s/p 23% saline x1, and mannitol 100 x1  - neurology consulted f/u recs  - neurosurgery consulted f/u recs  - palliative consulted f/u recs  - anoxic brain injury, MAP >80  - goal sodium 150-155 5/21  - d/yordan hypertonic na 157  - q4 BMP  - repeat ct head  - targeted temperature management, maintain normothermia (32 - 37.5C)  - vEEG  - f/u neuron specific enolase (May 20/21/22)    #Anxiety / Depression  #Bipolar 1 Diagnosis  On lithium 450mg in am and 450mg qpm. lithium level low at 0.26. On hctz. Lithium levels should be elevated iso of hctz use, however level indicates underdosing. Unknown last change in regimen. Last refilled 4/25/24. Psychiatrist at [facility].  - hold home med    #Sedated(resolved)  #Intubated  - off sedation    #C/f hepatic encephalopathy (resolved)  - d/yordan lactulose 20g q6  - c/w rifaximin 550 bid    Pulm  #Intubation  On ventilator. VBG consistent with respiratory acidosis initially. now with resolution  Current settings: FIO2 40; ; RR 18; PEEP 5  ABG with elevation in CO2 5/21  - increased RR to 18 from 14  - given sodium bicarb 150 meq x1    Cards  #Cardiac Arrest  ACLS x2. ROSC obtained. pH on VBG 6.9 with pattern consistent with respiratory acidosis.   UTox positive for cocaine; however no signs of vasospasm > acute coronary syndrome with negative trops and non-ischemic EKG. High suspicion of fentanyl overdose leading to respiratory depression > hypercapnia > acidosis > cardiac arrest  - f/u fentanyl level  - Pacer pads in place   - follow cerebral edema/neuroprotective protocol outlined above     #HTN  home medications Losartan 100mg, HCTZ 25mg, Norvasc 6.25 BID   - holding home meds    #HLD  home medication Atorvastatin 40mg    - holding home Lipitor 40    GI  #Transaminitis  #C/f liver laceration  Likely shock liver i/s/o cardiac arrest. Distension on physical exam  AST/ALT ratio 1:1; serum alcohol negative. unlikely alcohol induced liver injury  CTAP: 5cm hypodense finding of liver representing irregular focal fatty infiltration vs hepatic laceration  - serial abdominal exams    #Ileus  likely 2/2 to fentanyl overdose. Found with drug paraphernalia nearby.  CT Abdomen -colon dilated to 8 cm with gas and semi solid/liquid stool. Stomach distended with gas and fluid.   - OG decompression  - rectal tube    #C/f bowel ischemia (resolved)  Bowel ischemia iso of cocaine and hypoperfusion. Lactate 7.2, elevated iso of cardiac arrest. Lactate at 2.0  CTAP: cecum, ascending, and transverse colon dilated to 8 CM in caliber  - continue to monitor w/ abdominal exams    #Chronic constipation  Home medications: Dulcolax and Miralax qd for constipation    #Chronic GERD  Home medication: omeprazole 40 qd    Renal  cristóbal    Rheum  #RA  Managed on Humira 40mg, MTX 2.5mg, and Naproxen PRN.    - hold Humira, MTX, leukovorin, and Naproxen    Endo  CRISTÓBAL    ID  #C/f staph epidermidis bacteremia  Blood culture revealing staph epidermidis,  - on vancomycin  - f/u surveillance blood culture    Heme  #Chronic MARCELL  Low HGB as of 08/2023, per outside records  Home medication: Ferrous Sulfate    PPX  E: Replete  N: NPO  GI: ppi qD  DVT ppx: lovenox  Dispo: MICU     FULL CODE     Assessment: 44 pmhx of bipolar (on lithium), htn (hctz), HLD, RA, GERD, hemorrhoids, MARCELL, lupus and fibromyalgia found to be in cardiac arrest possible 2/2 drug overdose (cocaine w/ possibly laced with fentanyl) admitted to the micu for post resuscitation care.    Neuro  #Anoxic brain injury 2/2 to cardiac arrest  UTox positive for cocaine. Briefly on pressor support and weaned.  CT Head - effacement of the cerebral sulci, ventricles and cisternal spaces secondary to cerebral edema. Findings consistent with anoxic brain injury. s/p 23% saline x1, and mannitol 100 x1  - neurology consulted f/u recs  - neurosurgery consulted f/u recs  - palliative consulted f/u recs  - anoxic brain injury, MAP >80  - goal sodium 150-155 5/21  - d/yordan hypertonic na 157  - q4 BMP  - repeat ct head  - targeted temperature management, maintain normothermia (32 - 37.5C)  - vEEG  - f/u neuron specific enolase (May 20/21/22)    #Anxiety / Depression  #Bipolar 1 Diagnosis  On lithium 450mg in am and 450mg qpm. lithium level low at 0.26. On hctz. Lithium levels should be elevated iso of hctz use, however level indicates underdosing. Unknown last change in regimen. Last refilled 4/25/24. Psychiatrist at [facility].  - hold home med    #Sedated(resolved)  #Intubated  - off sedation    #C/f hepatic encephalopathy (resolved)  - d/yordan lactulose 20g q6  - c/w rifaximin 550 bid    Pulm  #Intubation  On ventilator.  Current settings: FIO2 40; ; RR 18; PEEP 5  ABG with elevation in CO2 5/21  - increased RR to 18 from 14  - given sodium bicarb 150 meq x1    Cards  #Cardiac Arrest  ACLS x2. ROSC obtained.  UTox positive for cocaine; however no signs of vasospasm > acute coronary syndrome with negative trops and non-ischemic EKG. High suspicion of fentanyl overdose leading to respiratory depression > hypercapnia > acidosis > cardiac arrest  - f/u fentanyl level  - Pacer pads in place   - follow cerebral edema/neuroprotective protocol outlined above     #HTN  home medications Losartan 100mg, HCTZ 25mg, Norvasc 6.25 BID   - holding home meds    #HLD  home medication Atorvastatin 40mg    - holding home Lipitor 40    GI  #Transaminitis  #C/f liver laceration  Likely shock liver i/s/o cardiac arrest. Distension on physical exam  AST/ALT ratio 1:1; serum alcohol negative. unlikely alcohol induced liver injury  CTAP: 5cm hypodense finding of liver representing irregular focal fatty infiltration vs hepatic laceration  - serial abdominal exams    #Ileus  likely 2/2 to fentanyl overdose. Found with drug paraphernalia nearby.  CT Abdomen -colon dilated to 8 cm with gas and semi solid/liquid stool. Stomach distended with gas and fluid.   - OG decompression  - rectal tube    #C/f bowel ischemia (resolved)  Bowel ischemia iso of cocaine and hypoperfusion. Lactate 7.2, elevated iso of cardiac arrest. Lactate at 2.0  CTAP: cecum, ascending, and transverse colon dilated to 8 CM in caliber  - continue to monitor w/ abdominal exams    #Chronic constipation  Home medications: Dulcolax and Miralax qd for constipation    #Chronic GERD  Home medication: omeprazole 40 qd    Renal  cristóbal    Rheum  #RA  Managed on Humira 40mg, MTX 2.5mg, and Naproxen PRN.    - hold Humira, MTX, leukovorin, and Naproxen    Endo  CRISTÓBAL    ID  #C/f staph epidermidis bacteremia  Blood culture revealing staph epidermidis,  - on vancomycin  - f/u surveillance blood culture    Heme  #Chronic MARCELL  Low HGB as of 08/2023, per outside records  Home medication: Ferrous Sulfate    PPX  E: Replete  N: NPO  GI: ppi qD  DVT ppx: lovenox  Dispo: MICU     FULL CODE

## 2024-05-21 NOTE — PROGRESS NOTE ADULT - ASSESSMENT
·	continued emotional support for patient's family  ·	will follow up tomorrow regarding code status 43yo F with PMH of HTN, HLD, RA, MARCELL, Anxiety/MDD, Bipolar, Lupus, and Fibromyalgia p/w cardiac arrest in the setting of likely overdose. Palliative consulted for complex medical decision making in the setting of critical illness.    ·	continued emotional support for patient's family  ·	will follow up tomorrow regarding code status

## 2024-05-21 NOTE — MEDICAL STUDENT PROGRESS NOTE(EDUCATION) - ASSESSMENT
43yo F post cardiac arrest suspicious for substance intox s/p Narcan (zero effect), s/p ACLS x 2 (15mins in the field and 1 round ED) w/ ROSC s/p 2 rounds of EPI and a 3rd for hypotension. intubated/sedated. Lactic acidosis, transaminitis, hyperammonemia, pos cocaine. CTH w/ sulci effacement, tight ventricles and unclear grey-white differentiation, No hemorrhage or trauma. Neurology consulted for initial assessment to neuroprognostication. Neuroimaging is consistent w/ anoxic brain injury 2/2 cardiac arrest. On Exam comatose vs propofol sedation, febrile, PVD otherwise absent BSR. At this time prognosis remained guarded while sedated. This will be a baseline neurologic assessment given the acuity of presentation and will re-examine 70 post arrest, off sedation and correction of her electrolyte abnormality for neuroprognostication.       Post cardiac arrest  Transaminitis - shock vs MTX s/e  Hyperammonemia   Pos Cocaine drugs tox        Recommendations  Neuro and Vitals assessment per MICU  monitor clinically for signs of seizure  wean off propofol when able   continuous vEEG monitoring  repeat CTH 72hrs  f/u NSE  maintain seizure and aspiration risk precaution  correct electrolyte imbalance    Mannitol recommended q6 but discontinued due to hypokalemia, correct imbalance and reassess 45yo F post cardiac arrest suspicious for substance intox s/p Narcan (zero effect), s/p ACLS x 2 (15mins in the field and 1 round ED) w/ ROSC s/p 2 rounds of EPI and a 3rd for hypotension. intubated/sedated. Lactic acidosis, transaminitis, hyperammonemia, (+) cocaine. CTH w/ sulci effacement, tight ventricles and unclear grey-white differentiation, No hemorrhage or trauma. Neurology consulted for initial assessment to neuroprognostication. Neuroimaging is consistent w/ anoxic brain injury 2/2 cardiac arrest vs cerebral edema 2/ hyperammonemia. On Exam comatose, on cooling blanket, otherwise absent BSR, despite mannitol and 23% NaCl injection. Pt noted to be bacteremic. At this time prognosis remained guarded. Neurosurgery eval'd, no acute intervention, NSICU on board.      Recommendations  - Neuro and Vitals assessment per MICU  - monitor clinically for signs of seizure  - Treatment of bacteremia/electrolyte abnormalities per primary team  - Sodium goal 150-160   - C/w vEEG monitoring  - Obtain NSE 24, 48, and 72 hours  - Repeat CTH today if MR w/wo cannot be completed  - Obtain MR Brain w/wo when able   - C/w hypertonic saline as per NSICU 43yo F post cardiac arrest suspicious for substance intox s/p Narcan (zero effect), s/p ACLS x 2 (15mins in the field and 1 round ED) w/ ROSC s/p 2 rounds of EPI and a 3rd for hypotension. intubated/sedated. Lactic acidosis, transaminitis, hyperammonemia, (+) cocaine. CTH w/ sulci effacement, tight ventricles and unclear grey-white differentiation, No hemorrhage or trauma. Neurology consulted for initial assessment to neuroprognostication. Neuroimaging is consistent w/ anoxic brain injury 2/2 cardiac arrest vs cerebral edema 2/ hyperammonemia. On Exam comatose, on cooling blanket, otherwise absent BSR, despite mannitol and 23% NaCl injection. Pt noted to be bacteremic. At this time prognosis remained guarded. Neurosurgery eval'd, no acute intervention, NSICU on board.      Recommendations  - Neuro and Vitals assessment per MICU  - monitor clinically for signs of seizure  - Treatment of bacteremia/electrolyte abnormalities per primary team  - Sodium goal 150-155  - C/w vEEG monitoring  - Obtain NSE 24, 48, and 72 hours  - Repeat CTH today if MR w/wo cannot be completed  - Obtain MR Brain w/wo when able   - C/w hypertonic saline as per NSICU

## 2024-05-21 NOTE — DIETITIAN INITIAL EVALUATION ADULT - NSFNSGIIOFT_GEN_A_CORE
05-20-24 @ 07:01  -  05-21-24 @ 07:00  --------------------------------------------------------  OUT:    Emesis (mL): 1 mL    Nasogastric/Oral tube (mL): 850 mL  Total OUT: 851 mL    Total NET: -851 mL

## 2024-05-21 NOTE — MEDICAL STUDENT PROGRESS NOTE(EDUCATION) - NS MD HP STUD SUPERVISOR COMMENTS FT
Reviewed interval events - was hyperthermic then hypothermic during evaluation. Pupils anisocoric, unresponsive b/l. Other brainstem reflexes absent. Still breathing over vent but less so than previously. Sodium trending up on hypertonic saline, now hypocalcemic. On rifaximin and lactulose. WBC 17, gram + cocci in blood.   Continue to treat cerebral edema / hyperammonemia, obtain MRI brain if able (otherwise repeat CT head today to evaluate cerebral edema). EEG extremely attenuated; continue. Overall prognosis is likely poor but will continue to re-evaluate. f/u NSE levels, repeat tomorrow

## 2024-05-21 NOTE — DIETITIAN INITIAL EVALUATION ADULT - PERTINENT MEDS FT
MEDICATIONS  (STANDING):  chlorhexidine 0.12% Liquid 15 milliLiter(s) Oral Mucosa every 12 hours  chlorhexidine 2% Cloths 1 Application(s) Topical <User Schedule>  enoxaparin Injectable 40 milliGRAM(s) SubCutaneous every 24 hours  pantoprazole  Injectable 40 milliGRAM(s) IV Push daily  rifAXIMin 550 milliGRAM(s) Oral two times a day  sodium chloride 3%. 500 milliLiter(s) (40 mL/Hr) IV Continuous <Continuous>  vancomycin  IVPB 1500 milliGRAM(s) IV Intermittent every 12 hours    MEDICATIONS  (PRN):  fentaNYL    Injectable 50 MICROGram(s) IV Push every 2 hours PRN severe pain/vent dyssynchrony

## 2024-05-21 NOTE — MEDICAL STUDENT PROGRESS NOTE(EDUCATION) - SUBJECTIVE AND OBJECTIVE BOX
Neurology Progress Note     BENNY SORIANO 44y Female [id number]    Hospital Day: 3d     HPI:     HPI:  Patient is a 45 yo F with PMHx of HTN, HLD, RA, chronic GERD, hemorrhoids, chronic MARCELL, anxiety/MDD, bipolar with paula, lupus, and fibromyalgia presenting to [facility] ED after cardiac arrest. Patient was found down, apneic and pulseless at [facility] with drug paraphernalia (crack pipe, needles) around her. Unclear how long patient was down for. Given intranasal narcan at the scene without any changes. 15 minutes of ACLS was performed in the field w/ ROSC. Received 2 of epi, and was intubate. She because hypotensive enroute and was given a third dose of epi. She arrived in the ED and pulse was lost and again one more round of compressions performed w/ ROSC. Patient was started on pressor support.     Of note, she was seen at [facility] 3 days ago 5/17 for RLQ pain. LMP 5/6.   Spoke with daughter who lives in [place]. She is flying in from [place]     In the ED:  Initial vital signs: T: 97.3F, HR: 73, BP:76/40 R:  SpO2: 91% on BVM  Labs:  Hgb 10.6; pH 6.99; lactate 7.2; Na 132; HCO3 17; ; ; UTox   Imaging:   Interventions: 1L LR; Levophed; 50meq bicarb x2 and bicarb drip @150mL/hr   (19 May 2024 23:48)      Overnight events:  Hypoxic event     Subjective complaints:  Pt evaluated at bedside. Pt has no acute complaints.     Vital Signs  T(F): 95.9 (06:00), Max: 105 (11:00)  HR: 110 (08:00) (97 - 121)  BP: 98/70 (08:00) (89/56 - 124/69)  RR: 14 (08:00) (14 - 26)  SpO2: 97% (08:00) (95% - 100%)    Neurological Exam:   Mental status: Coma, intubated, ventilator, OGT to suction    Cognitive: Unresponsive to auditory and nailbed noxious stimulation.   Eyes: Absent blink to threat, no spontaneous eye opening. Eyes forward in primary gaze, negative vestibuloocoular response. Pupils are fixed and unequal (7mm Left; 5mm Right). Negative Corneal blink reflex.   Face: No facial asymmetry.   Ears/Nose/Throat: ET + OGT intact and functional. - cough/- GAG  Motor examination:   Normal bulk. Flaccid throughout. No w/drawal, myoclonus, triple flex or posturing to noxious             Labs:  WBC 17.33 /HGB 13.1 /MCV 87.9 /HCT 43.6 / / 05-21  WBC 13.06 /HGB 13.8 /MCV 83.6 /HCT 43.8 / / 05-20  WBC 17.03 /HGB 14.8 /MCV 85.1 /HCT 47.8 / / 05-20  WBC 17.25 /HGB 15.6 /MCV 85.2 /HCT 50.7 / / 05-20  WBC 4.25 /HGB 10.6 /MCV 88.5 /HCT 35.5 / / 05-19 05-21    148<H>  |  120<H>  |  28<H>  ----------------------------<  171<H>  3.6   |  18<L>  |  1.07    Ca    7.5<L>      21 May 2024 05:30  Phos  3.9     05-21  Mg     2.8     05-21    TPro  6.0  /  Alb  3.2<L>  /  TBili  0.3  /  DBili  x   /  AST  408<H>  /  ALT  274<H>  /  AlkPhos  78  05-21    LIVER FUNCTIONS - ( 21 May 2024 05:30 )  Alb: 3.2 g/dL / Pro: 6.0 g/dL / ALK PHOS: 78 U/L / ALT: 274 U/L / AST: 408 U/L / GGT: x           PT/INR - ( 19 May 2024 21:54 )   PT: 9.8 sec;   INR: 0.86          PTT - ( 19 May 2024 21:54 )  PTT:29.1 sec  Urinalysis Basic - ( 21 May 2024 05:30 )    Color: x / Appearance: x / SG: x / pH: x  Gluc: 171 mg/dL / Ketone: x  / Bili: x / Urobili: x   Blood: x / Protein: x / Nitrite: x   Leuk Esterase: x / RBC: x / WBC x   Sq Epi: x / Non Sq Epi: x / Bacteria: x        Medications:  chlorhexidine 0.12% Liquid 15 milliLiter(s) Oral Mucosa every 12 hours  chlorhexidine 2% Cloths 1 Application(s) Topical <User Schedule>  enoxaparin Injectable 40 milliGRAM(s) SubCutaneous every 24 hours  lactulose Syrup 20 Gram(s) Oral every 6 hours  pantoprazole  Injectable 40 milliGRAM(s) IV Push daily  piperacillin/tazobactam IVPB.. 3.375 Gram(s) IV Intermittent every 8 hours  rifAXIMin 550 milliGRAM(s) Oral two times a day  vancomycin  IVPB 1500 milliGRAM(s) IV Intermittent every 12 hours      Neuroimaging:      PERTINENT HISTORICAL RESULTS: Neurology Progress Note     BENNY SORIANO 44y Female [id number]    Hospital Day: 3d     HPI:     HPI:  Patient is a 43 yo F with PMHx of HTN, HLD, RA, chronic GERD, hemorrhoids, chronic MARCELL, anxiety/MDD, bipolar with paula, lupus, and fibromyalgia presenting to [facility] ED after cardiac arrest. Patient was found down, apneic and pulseless at [facility] with drug paraphernalia (crack pipe, needles) around her. Unclear how long patient was down for. Given intranasal narcan at the scene without any changes. 15 minutes of ACLS was performed in the field w/ ROSC. Received 2 of epi, and was intubate. She because hypotensive enroute and was given a third dose of epi. She arrived in the ED and pulse was lost and again one more round of compressions performed w/ ROSC. Patient was started on pressor support.     Of note, she was seen at [facility] 3 days ago 5/17 for RLQ pain. LMP 5/6.   Spoke with daughter who lives in [place]. She is flying in from [place]     In the ED:  Initial vital signs: T: 97.3F, HR: 73, BP:76/40 R:  SpO2: 91% on BVM  Labs:  Hgb 10.6; pH 6.99; lactate 7.2; Na 132; HCO3 17; ; ; UTox   Imaging:   Interventions: 1L LR; Levophed; 50meq bicarb x2 and bicarb drip @150mL/hr   (19 May 2024 23:48)      Overnight events:  Hypoxic event     Subjective complaints:  Pt evaluated at bedside.     Vital Signs  T(F): 95.9 (06:00), Max: 105 (11:00)  HR: 110 (08:00) (97 - 121)  BP: 98/70 (08:00) (89/56 - 124/69)  RR: 14 (08:00) (14 - 26)  SpO2: 97% (08:00) (95% - 100%)    Neurological Exam (off sedation):   Mental status: Coma, intubated, ventilator, OGT to suction    Cognitive: Unresponsive to auditory and nailbed noxious stimulation.   Eyes: Absent blink to threat, no spontaneous eye opening. Eyes forward in primary gaze, negative vestibuloocoular response. Pupils are fixed and unequal (7mm Left; 5mm Right). Negative Corneal blink reflex.   Face: No facial asymmetry.   Ears/Nose/Throat: ET + OGT intact and functional. - cough/- GAG  Motor examination:   Normal bulk. Flaccid throughout. No w/drawal, myoclonus, triple flex or posturing to noxious             Labs:  WBC 17.33 /HGB 13.1 /MCV 87.9 /HCT 43.6 / / 05-21  WBC 13.06 /HGB 13.8 /MCV 83.6 /HCT 43.8 / / 05-20  WBC 17.03 /HGB 14.8 /MCV 85.1 /HCT 47.8 / / 05-20  WBC 17.25 /HGB 15.6 /MCV 85.2 /HCT 50.7 / / 05-20  WBC 4.25 /HGB 10.6 /MCV 88.5 /HCT 35.5 / / 05-19 05-21    148<H>  |  120<H>  |  28<H>  ----------------------------<  171<H>  3.6   |  18<L>  |  1.07    Ca    7.5<L>      21 May 2024 05:30  Phos  3.9     05-21  Mg     2.8     05-21    TPro  6.0  /  Alb  3.2<L>  /  TBili  0.3  /  DBili  x   /  AST  408<H>  /  ALT  274<H>  /  AlkPhos  78  05-21    LIVER FUNCTIONS - ( 21 May 2024 05:30 )  Alb: 3.2 g/dL / Pro: 6.0 g/dL / ALK PHOS: 78 U/L / ALT: 274 U/L / AST: 408 U/L / GGT: x           PT/INR - ( 19 May 2024 21:54 )   PT: 9.8 sec;   INR: 0.86          PTT - ( 19 May 2024 21:54 )  PTT:29.1 sec  Urinalysis Basic - ( 21 May 2024 05:30 )    Color: x / Appearance: x / SG: x / pH: x  Gluc: 171 mg/dL / Ketone: x  / Bili: x / Urobili: x   Blood: x / Protein: x / Nitrite: x   Leuk Esterase: x / RBC: x / WBC x   Sq Epi: x / Non Sq Epi: x / Bacteria: x        Medications:  chlorhexidine 0.12% Liquid 15 milliLiter(s) Oral Mucosa every 12 hours  chlorhexidine 2% Cloths 1 Application(s) Topical <User Schedule>  enoxaparin Injectable 40 milliGRAM(s) SubCutaneous every 24 hours  lactulose Syrup 20 Gram(s) Oral every 6 hours  pantoprazole  Injectable 40 milliGRAM(s) IV Push daily  piperacillin/tazobactam IVPB.. 3.375 Gram(s) IV Intermittent every 8 hours  rifAXIMin 550 milliGRAM(s) Oral two times a day  vancomycin  IVPB 1500 milliGRAM(s) IV Intermittent every 12 hours      Neuroimaging:      PERTINENT HISTORICAL RESULTS: Neurology Progress Note     BENNY SORIANO 44y Female [id number]    Hospital Day: 3d      Overnight events:  Hypoxic event     Subjective complaints:  Pt evaluated at bedside. Unable to provide interval history due to mental status.     Vital Signs  T(F): 95.9 (06:00), Max: 105 (11:00)  HR: 110 (08:00) (97 - 121)  BP: 98/70 (08:00) (89/56 - 124/69)  RR: 14 (08:00) (14 - 26)  SpO2: 97% (08:00) (95% - 100%)    Neurological Exam (off sedation):   Mental status: Coma, intubated, ventilator, OGT to suction    Cognitive: Unresponsive to auditory and nailbed noxious stimulation.   Eyes: Absent blink to threat, no spontaneous eye opening. Eyes forward in primary gaze, negative vestibuloocoular response. Pupils are fixed and unequal (7mm Left; 5mm Right). Negative Corneal blink reflex.   Face: No facial asymmetry.   Ears/Nose/Throat: ET + OGT intact and functional. - cough/- GAG  Motor examination:   Normal bulk. Flaccid throughout. No w/drawal, myoclonus, triple flex or posturing to noxious             Labs:  WBC 17.33 /HGB 13.1 /MCV 87.9 /HCT 43.6 / / 05-21  WBC 13.06 /HGB 13.8 /MCV 83.6 /HCT 43.8 / / 05-20  WBC 17.03 /HGB 14.8 /MCV 85.1 /HCT 47.8 / / 05-20  WBC 17.25 /HGB 15.6 /MCV 85.2 /HCT 50.7 / / 05-20  WBC 4.25 /HGB 10.6 /MCV 88.5 /HCT 35.5 / / 05-19    05-21    148<H>  |  120<H>  |  28<H>  ----------------------------<  171<H>  3.6   |  18<L>  |  1.07    Ca    7.5<L>      21 May 2024 05:30  Phos  3.9     05-21  Mg     2.8     05-21    TPro  6.0  /  Alb  3.2<L>  /  TBili  0.3  /  DBili  x   /  AST  408<H>  /  ALT  274<H>  /  AlkPhos  78  05-21    LIVER FUNCTIONS - ( 21 May 2024 05:30 )  Alb: 3.2 g/dL / Pro: 6.0 g/dL / ALK PHOS: 78 U/L / ALT: 274 U/L / AST: 408 U/L / GGT: x           PT/INR - ( 19 May 2024 21:54 )   PT: 9.8 sec;   INR: 0.86          PTT - ( 19 May 2024 21:54 )  PTT:29.1 sec  Urinalysis Basic - ( 21 May 2024 05:30 )    Color: x / Appearance: x / SG: x / pH: x  Gluc: 171 mg/dL / Ketone: x  / Bili: x / Urobili: x   Blood: x / Protein: x / Nitrite: x   Leuk Esterase: x / RBC: x / WBC x   Sq Epi: x / Non Sq Epi: x / Bacteria: x        Medications:  chlorhexidine 0.12% Liquid 15 milliLiter(s) Oral Mucosa every 12 hours  chlorhexidine 2% Cloths 1 Application(s) Topical <User Schedule>  enoxaparin Injectable 40 milliGRAM(s) SubCutaneous every 24 hours  lactulose Syrup 20 Gram(s) Oral every 6 hours  pantoprazole  Injectable 40 milliGRAM(s) IV Push daily  piperacillin/tazobactam IVPB.. 3.375 Gram(s) IV Intermittent every 8 hours  rifAXIMin 550 milliGRAM(s) Oral two times a day  vancomycin  IVPB 1500 milliGRAM(s) IV Intermittent every 12 hours      Neuroimaging:      PERTINENT HISTORICAL RESULTS:

## 2024-05-22 NOTE — PROGRESS NOTE ADULT - PROBLEM SELECTOR PLAN 6
.  Complex medical decision making / symptom management in the setting of critical illness.    Will continue to follow for ongoing GOC discussion / titration of palliative regimen. Emotional support provided, questions answered.  Active Psychosocial Referrals:  [x]Social Work/Case management []PT/OT []Chaplaincy []Hospice  []Patient/Family Support []Holistic RN []Massage Therapy []Music Therapy []Ethics  Coping: [] well [] with difficulty [] poor coping [x] unable to assess  Support system: [] strong [x] adequate [] inadequate    For new or uncontrolled symptoms, please call Palliative Care at [phone]). The service is available 24/7 (including nights & weekends) to provide symptom management recommendations over the phone as appropriate
.  Complex medical decision making / symptom management in the setting of critical illness.    Will continue to follow for ongoing GOC discussion / titration of palliative regimen. Emotional support provided, questions answered.  Active Psychosocial Referrals:  [x]Social Work/Case management []PT/OT []Chaplaincy []Hospice  []Patient/Family Support []Holistic RN []Massage Therapy []Music Therapy []Ethics  Coping: [] well [] with difficulty [] poor coping [x] unable to assess  Support system: [] strong [x] adequate [] inadequate    For new or uncontrolled symptoms, please call Palliative Care at [phone]). The service is available 24/7 (including nights & weekends) to provide symptom management recommendations over the phone as appropriate

## 2024-05-22 NOTE — PROGRESS NOTE ADULT - ASSESSMENT
·	discussed interval medical events and worsening exam with patient's daughter, she is in agreement with DNR/DNI and allowing a natural death as there is no reasonable expectation for meaningful recovery  ·	advised team to contact [facility] for possible organ donation  ·	MOLST completed and placed in chart    In addition to the E/M visit, an advance care planning meeting was performed. Start time: 11:10AM; End time: 11:26AM; Total time: 16min. A face to face meeting to discuss advance care planning was held today regarding: BENNY SORIANO. Primary decision maker: Patient is unable to participate in decision making; Alternate/surrogate: Daughter. Discussed advance directives including, but not limited to, healthcare proxy and code status. Decision regarding code status: DNR/DNI; Documentation completed today: MOLST [facility] note 45yo F with PMH of HTN, HLD, RA, MARCELL, Anxiety/MDD, Bipolar, Lupus, and Fibromyalgia p/w cardiac arrest in the setting of likely overdose. Palliative consulted for complex medical decision making in the setting of critical illness.    ·	discussed interval medical events and worsening exam with patient's daughter, she is in agreement with DNR/DNI and allowing a natural death as there is no reasonable expectation for meaningful recovery  ·	advised team to contact [employer] for possible organ donation  ·	MOLST completed and placed in chart

## 2024-05-22 NOTE — MEDICAL STUDENT PROGRESS NOTE(EDUCATION) - SUBJECTIVE AND OBJECTIVE BOX
Neurology Progress Note     BENNY SORIANO 44y Female [id number]    Hospital Day: 3d     HPI:     HPI:  Patient is a 43 yo F with PMHx of HTN, HLD, RA, chronic GERD, hemorrhoids, chronic MARCELL, anxiety/MDD, bipolar with paula, lupus, and fibromyalgia presenting to [facility] ED after cardiac arrest. Patient was found down, apneic and pulseless at [facility] with drug paraphernalia (crack pipe, needles) around her. Unclear how long patient was down for. Given intranasal narcan at the scene without any changes. 15 minutes of ACLS was performed in the field w/ ROSC. Received 2 of epi, and was intubate. She because hypotensive enroute and was given a third dose of epi. She arrived in the ED and pulse was lost and again one more round of compressions performed w/ ROSC. Patient was started on pressor support.     Of note, she was seen at [facility] 3 days ago  for RLQ pain. LMP 5/6.   Spoke with daughter who lives in [place]. She is flying in from [place]     In the ED:  Initial vital signs: T: 97.3F, HR: 73, BP:76/40 R:  SpO2: 91% on BVM  Labs:  Hgb 10.6; pH 6.99; lactate 7.2; Na 132; HCO3 17; ; ; UTox   Imaging:   Interventions: 1L LR; Levophed; 50meq bicarb x2 and bicarb drip @150mL/hr   (19 May 2024 23:48)      Overnight events:  Sodium 163    Subjective complaints:  Pt evaluated at bedside. Pt has no acute complaints.     Vital Signs  T(F): 97.2 (01:05), Max: 99.7 (22:16)  HR: 97 (06:16) (97 - 132)  BP: 118/56 (15:00) (96/55 - 133/63)  RR: 20 (06:16) (14 - 21)  SpO2: 100% (06:16) (97% - 100%)    Neurological Exam (off sedation):   Mental status: Coma, intubated, ventilator, OGT to suction    Cognitive: Unresponsive to auditory and nailbed noxious stimulation.   Eyes: Absent blink to threat, no spontaneous eye opening. Eyes forward in primary gaze, negative vestibuloocoular response. Pupils are fixed and unequal (__mm Left; __mm Right). Negative Corneal blink reflex.   Face: No facial asymmetry.   Ears/Nose/Throat: ET + OGT intact and functional. - cough/- GAG  Motor examination:   Normal bulk. Flaccid throughout. No w/drawal, myoclonus, triple flex or posturing to noxious       Labs:  WBC 12.44 /HGB 9.9 /MCV 86.7 /HCT 32.7 / /   WBC 14.79 /HGB 12.1 /MCV 85.3 /HCT 38.8 / /   WBC 17.33 /HGB 13.1 /MCV 87.9 /HCT 43.6 / /   WBC 13.06 /HGB 13.8 /MCV 83.6 /HCT 43.8 / /     159<H>  |  128<H>  |  12  ----------------------------<  208<H>  3.6   |  23  |  0.73    Ca    7.6<L>      22 May 2024 05:30  Phos  1.8       Mg     2.4         TPro  4.8<L>  /  Alb  2.4<L>  /  TBili  0.2  /  DBili  x   /  AST  331<H>  /  ALT  200<H>  /  AlkPhos  61      LIVER FUNCTIONS - ( 22 May 2024 05:30 )  Alb: 2.4 g/dL / Pro: 4.8 g/dL / ALK PHOS: 61 U/L / ALT: 200 U/L / AST: 331 U/L / GGT: x           PT/INR - ( 21 May 2024 16:08 )   PT: 11.5 sec;   INR: 1.01          PTT - ( 21 May 2024 16:08 )  PTT:29.3 sec  Urinalysis Basic - ( 22 May 2024 05:30 )    Color: yellow / Appearance: turbid / S.011/ pH: 6  Gluc: 208 mg/dL / Ketone: trace/ Bili: Neg/ Urobili: 0.2  Blood: Large/ Protein: x / Nitrite: Neg  Leuk Esterase: Neg/ RBC: x / WBC x   Sq Epi: x / Non Sq Epi: x / Bacteria: x        Medications:  cefTRIAXone   IVPB 2000 milliGRAM(s) IV Intermittent every 24 hours  chlorhexidine 0.12% Liquid 15 milliLiter(s) Oral Mucosa every 12 hours  chlorhexidine 2% Cloths 1 Application(s) Topical <User Schedule>  dextrose 5%. 500 milliLiter(s) IV Continuous <Continuous>  dextrose 5%. 1000 milliLiter(s) IV Continuous <Continuous>  enoxaparin Injectable 40 milliGRAM(s) SubCutaneous every 24 hours  fentaNYL    Injectable 50 MICROGram(s) IV Push every 2 hours PRN  norepinephrine Infusion 0.05 MICROgram(s)/kG/Min IV Continuous <Continuous>  pantoprazole  Injectable 40 milliGRAM(s) IV Push daily  potassium phosphate IVPB 30 milliMole(s) IV Intermittent once  rifAXIMin 550 milliGRAM(s) Oral two times a day      Neuroimaging:      PERTINENT HISTORICAL RESULTS:   Neurology Progress Note     BENNY SORIANO 44y Female [id number]    Hospital Day: 3d     HPI:     HPI:  Patient is a 45 yo F with PMHx of HTN, HLD, RA, chronic GERD, hemorrhoids, chronic MARCELL, anxiety/MDD, bipolar with paula, lupus, and fibromyalgia presenting to [facility] ED after cardiac arrest. Patient was found down, apneic and pulseless at [facility] with drug paraphernalia (crack pipe, needles) around her. Unclear how long patient was down for. Given intranasal narcan at the scene without any changes. 15 minutes of ACLS was performed in the field w/ ROSC. Received 2 of epi, and was intubate. She because hypotensive enroute and was given a third dose of epi. She arrived in the ED and pulse was lost and again one more round of compressions performed w/ ROSC. Patient was started on pressor support.     Of note, she was seen at [facility] 3 days ago  for RLQ pain. LMP 5/6.   Spoke with daughter who lives in [place]. She is flying in from [place]     In the ED:  Initial vital signs: T: 97.3F, HR: 73, BP:76/40 R:  SpO2: 91% on BVM  Labs:  Hgb 10.6; pH 6.99; lactate 7.2; Na 132; HCO3 17; ; ; UTox   Imaging:   Interventions: 1L LR; Levophed; 50meq bicarb x2 and bicarb drip @150mL/hr   (19 May 2024 23:48)      Overnight events:  Sodium 163, pt given desmopressin, dextrose 5%, and lactated ringers overnight, sodium 159 this morning    Subjective complaints:  Pt evaluated at bedside. Pt has no acute complaints.     Vital Signs  T(F): 97.2 (01:05), Max: 99.7 (22:16)  HR: 97 (06:16) (97 - 132)  BP: 118/56 (15:00) (96/55 - 133/63)  RR: 20 (06:16) (14 - 21)  SpO2: 100% (06:16) (97% - 100%)    Neurological Exam (off sedation):   Mental status: Coma, intubated, ventilator, OGT to suction    Cognitive: Unresponsive to auditory and nailbed noxious stimulation.   Eyes: Absent blink to threat, no spontaneous eye opening. Eyes forward in primary gaze, negative vestibuloocoular response. Pupils are fixed and unequal (__mm Left; __mm Right). Negative Corneal blink reflex.   Face: No facial asymmetry.   Ears/Nose/Throat: ET + OGT intact and functional. - cough/- GAG  Motor examination:   Normal bulk. Flaccid throughout. No w/drawal, myoclonus, triple flex or posturing to noxious       Labs:  WBC 12.44 /HGB 9.9 /MCV 86.7 /HCT 32.7 / /   WBC 14.79 /HGB 12.1 /MCV 85.3 /HCT 38.8 / /   WBC 17.33 /HGB 13.1 /MCV 87.9 /HCT 43.6 / /   WBC 13.06 /HGB 13.8 /MCV 83.6 /HCT 43.8 / / 20        159<H>  |  128<H>  |  12  ----------------------------<  208<H>  3.6   |  23  |  0.73    Ca    7.6<L>      22 May 2024 05:30  Phos  1.8       Mg     2.4         TPro  4.8<L>  /  Alb  2.4<L>  /  TBili  0.2  /  DBili  x   /  AST  331<H>  /  ALT  200<H>  /  AlkPhos  61  22    LIVER FUNCTIONS - ( 22 May 2024 05:30 )  Alb: 2.4 g/dL / Pro: 4.8 g/dL / ALK PHOS: 61 U/L / ALT: 200 U/L / AST: 331 U/L / GGT: x           PT/INR - ( 21 May 2024 16:08 )   PT: 11.5 sec;   INR: 1.01          PTT - ( 21 May 2024 16:08 )  PTT:29.3 sec  Urinalysis Basic - ( 22 May 2024 05:30 )    Color: yellow / Appearance: turbid / S.011/ pH: 6  Gluc: 208 mg/dL / Ketone: trace/ Bili: Neg/ Urobili: 0.2  Blood: Large/ Protein: x / Nitrite: Neg  Leuk Esterase: Neg/ RBC: x / WBC x   Sq Epi: x / Non Sq Epi: x / Bacteria: x        Medications:  cefTRIAXone   IVPB 2000 milliGRAM(s) IV Intermittent every 24 hours  chlorhexidine 0.12% Liquid 15 milliLiter(s) Oral Mucosa every 12 hours  chlorhexidine 2% Cloths 1 Application(s) Topical <User Schedule>  dextrose 5%. 500 milliLiter(s) IV Continuous <Continuous>  dextrose 5%. 1000 milliLiter(s) IV Continuous <Continuous>  enoxaparin Injectable 40 milliGRAM(s) SubCutaneous every 24 hours  fentaNYL    Injectable 50 MICROGram(s) IV Push every 2 hours PRN  norepinephrine Infusion 0.05 MICROgram(s)/kG/Min IV Continuous <Continuous>  pantoprazole  Injectable 40 milliGRAM(s) IV Push daily  potassium phosphate IVPB 30 milliMole(s) IV Intermittent once  rifAXIMin 550 milliGRAM(s) Oral two times a day      Neuroimaging:      PERTINENT HISTORICAL RESULTS:

## 2024-05-22 NOTE — PROGRESS NOTE ADULT - CONVERSATION DETAILS
Reviewed patient's hospital course and interval developments. Discussed advanced directives including code status, HCP completion, and hospice eligibility. Discussed interval medical events and worsening exam with patient's daughter, she is in agreement with DNR/DNI and allowing a natural death as there is no reasonable expectation for meaningful recovery.

## 2024-05-22 NOTE — PROGRESS NOTE ADULT - SUBJECTIVE AND OBJECTIVE BOX
Neurology Progress Note     BENNY SORIANO 44y Female [id number]    Hospital Day: 4d     HPI:     HPI:  Patient is a 43 yo F with PMHx of HTN, HLD, RA, chronic GERD, hemorrhoids, chronic MARCELL, anxiety/MDD, bipolar with paula, lupus, and fibromyalgia presenting to [facility] ED after cardiac arrest. Patient was found down, apneic and pulseless at [facility] with drug paraphernalia (crack pipe, needles) around her. Unclear how long patient was down for. Given intranasal narcan at the scene without any changes. 15 minutes of ACLS was performed in the field w/ ROSC. Received 2 of epi, and was intubate. She because hypotensive enroute and was given a third dose of epi. She arrived in the ED and pulse was lost and again one more round of compressions performed w/ ROSC. Patient was started on pressor support.     Of note, she was seen at [facility] 3 days ago  for RLQ pain. LMP 5/.   Spoke with daughter who lives in [place]. She is flying in from [place]     In the ED:  Initial vital signs: T: 97.3F, HR: 73, BP:76/40 R:  SpO2: 91% on BVM  Labs:  Hgb 10.6; pH 6.99; lactate 7.2; Na 132; HCO3 17; ; ; UTox   Imaging:   Interventions: 1L LR; Levophed; 50meq bicarb x2 and bicarb drip @150mL/hr   (19 May 2024 23:48)      Overnight events:  Sodium 163, pt given desmopressin, dextrose 5%, and lactated ringers overnight, sodium 159 this morning    Subjective complaints:  Pt evaluated at bedside. Pt has no acute complaints.     Vital Signs  T(F): 97.2 (01:05), Max: 99.7 (22:16)  HR: 97 (06:16) (97 - 132)  BP: 118/56 (15:00) (96/55 - 133/63)  RR: 20 (06:16) (14 - 21)  SpO2: 100% (06:16) (97% - 100%)    Neurological Exam (off sedation):   Mental status: Coma, intubated, ventilator, OGT to suction    Cognitive: Unresponsive to auditory and nailbed noxious stimulation.   Eyes: Absent blink to threat, no spontaneous eye opening. Eyes forward in primary gaze, negative vestibuloocoular response. Pupils are fixed and unequal (__mm Left; __mm Right). Negative Corneal blink reflex.   Face: No facial asymmetry.   Ears/Nose/Throat: ET + OGT intact and functional. - cough/- GAG  Motor examination:   Normal bulk. Flaccid throughout. No w/drawal, myoclonus, triple flex or posturing to noxious       Labs:  WBC 12.44 /HGB 9.9 /MCV 86.7 /HCT 32.7 / /   WBC 14.79 /HGB 12.1 /MCV 85.3 /HCT 38.8 / /   WBC 17.33 /HGB 13.1 /MCV 87.9 /HCT 43.6 / /   WBC 13.06 /HGB 13.8 /MCV 83.6 /HCT 43.8 / / 20        159<H>  |  128<H>  |  12  ----------------------------<  208<H>  3.6   |  23  |  0.73    Ca    7.6<L>      22 May 2024 05:30  Phos  1.8       Mg     2.4         TPro  4.8<L>  /  Alb  2.4<L>  /  TBili  0.2  /  DBili  x   /  AST  331<H>  /  ALT  200<H>  /  AlkPhos  61  22    LIVER FUNCTIONS - ( 22 May 2024 05:30 )  Alb: 2.4 g/dL / Pro: 4.8 g/dL / ALK PHOS: 61 U/L / ALT: 200 U/L / AST: 331 U/L / GGT: x           PT/INR - ( 21 May 2024 16:08 )   PT: 11.5 sec;   INR: 1.01          PTT - ( 21 May 2024 16:08 )  PTT:29.3 sec  Urinalysis Basic - ( 22 May 2024 05:30 )    Color: yellow / Appearance: turbid / S.011/ pH: 6  Gluc: 208 mg/dL / Ketone: trace/ Bili: Neg/ Urobili: 0.2  Blood: Large/ Protein: x / Nitrite: Neg  Leuk Esterase: Neg/ RBC: x / WBC x   Sq Epi: x / Non Sq Epi: x / Bacteria: x        Medications:  cefTRIAXone   IVPB 2000 milliGRAM(s) IV Intermittent every 24 hours  chlorhexidine 0.12% Liquid 15 milliLiter(s) Oral Mucosa every 12 hours  chlorhexidine 2% Cloths 1 Application(s) Topical <User Schedule>  dextrose 5%. 500 milliLiter(s) IV Continuous <Continuous>  dextrose 5%. 1000 milliLiter(s) IV Continuous <Continuous>  enoxaparin Injectable 40 milliGRAM(s) SubCutaneous every 24 hours  fentaNYL    Injectable 50 MICROGram(s) IV Push every 2 hours PRN  norepinephrine Infusion 0.05 MICROgram(s)/kG/Min IV Continuous <Continuous>  pantoprazole  Injectable 40 milliGRAM(s) IV Push daily  potassium phosphate IVPB 30 milliMole(s) IV Intermittent once  rifAXIMin 550 milliGRAM(s) Oral two times a day      Neuroimaging:      PERTINENT HISTORICAL RESULTS:   Neurology Progress Note     BENNY SORIANO 44y Female [id number]    Hospital Day: 4d     Overnight events:  Sodium 163, pt given desmopressin, dextrose 5%, and lactated ringers overnight, sodium 159 this morning    Subjective complaints:  Pt evaluated at bedside. Unable to provide history due to mental status.     Vital Signs  T(F): 97.2 (01:05), Max: 99.7 (22:16)  HR: 97 (06:16) (97 - 132)  BP: 118/56 (15:00) (96/55 - 133/63)  RR: 20 (06:16) (14 - 21)  SpO2: 100% (06:16) (97% - 100%)    Neurological Exam (off sedation):   Mental status: Coma, intubated, ventilator, OGT to suction    Cognitive: Unresponsive to auditory and nailbed noxious stimulation.   Eyes: Absent blink to threat, no spontaneous eye opening. Eyes forward in primary gaze, negative vestibuloocoular response. Pupils are fixed and unequal (4mm Left; 6mm Right). Negative Corneal blink reflex.   Face: No facial asymmetry.   Ears/Nose/Throat: ET + OGT intact and functional. - cough/- GAG  Motor examination:   Normal bulk. Flaccid throughout. No w/drawal, myoclonus, triple flex or posturing to noxious       Labs:  WBC 12.44 /HGB 9.9 /MCV 86.7 /HCT 32.7 / /   WBC 14.79 /HGB 12.1 /MCV 85.3 /HCT 38.8 / / -21  WBC 17.33 /HGB 13.1 /MCV 87.9 /HCT 43.6 / / -21  WBC 13.06 /HGB 13.8 /MCV 83.6 /HCT 43.8 / / 05-20        159<H>  |  128<H>  |  12  ----------------------------<  208<H>  3.6   |  23  |  0.73    Ca    7.6<L>      22 May 2024 05:30  Phos  1.8       Mg     2.4         TPro  4.8<L>  /  Alb  2.4<L>  /  TBili  0.2  /  DBili  x   /  AST  331<H>  /  ALT  200<H>  /  AlkPhos  61  05-22    LIVER FUNCTIONS - ( 22 May 2024 05:30 )  Alb: 2.4 g/dL / Pro: 4.8 g/dL / ALK PHOS: 61 U/L / ALT: 200 U/L / AST: 331 U/L / GGT: x           PT/INR - ( 21 May 2024 16:08 )   PT: 11.5 sec;   INR: 1.01          PTT - ( 21 May 2024 16:08 )  PTT:29.3 sec  Urinalysis Basic - ( 22 May 2024 05:30 )    Color: yellow / Appearance: turbid / S.011/ pH: 6  Gluc: 208 mg/dL / Ketone: trace/ Bili: Neg/ Urobili: 0.2  Blood: Large/ Protein: x / Nitrite: Neg  Leuk Esterase: Neg/ RBC: x / WBC x   Sq Epi: x / Non Sq Epi: x / Bacteria: x        Medications:  cefTRIAXone   IVPB 2000 milliGRAM(s) IV Intermittent every 24 hours  chlorhexidine 0.12% Liquid 15 milliLiter(s) Oral Mucosa every 12 hours  chlorhexidine 2% Cloths 1 Application(s) Topical <User Schedule>  dextrose 5%. 500 milliLiter(s) IV Continuous <Continuous>  dextrose 5%. 1000 milliLiter(s) IV Continuous <Continuous>  enoxaparin Injectable 40 milliGRAM(s) SubCutaneous every 24 hours  fentaNYL    Injectable 50 MICROGram(s) IV Push every 2 hours PRN  norepinephrine Infusion 0.05 MICROgram(s)/kG/Min IV Continuous <Continuous>  pantoprazole  Injectable 40 milliGRAM(s) IV Push daily  potassium phosphate IVPB 30 milliMole(s) IV Intermittent once  rifAXIMin 550 milliGRAM(s) Oral two times a day      Neuroimaging:      PERTINENT HISTORICAL RESULTS:

## 2024-05-22 NOTE — PROGRESS NOTE ADULT - PROBLEM SELECTOR PLAN 3
.  Noted to have anoxic injury in the setting of cardiac arrest  -awaiting correction of reversible conditions (temperature, electrolytes, etc)  -further prognostication pending clinical course but suspect patient will not make any meaningful recovery
.  Noted to have anoxic injury in the setting of cardiac arrest  -s/p correction of reversible conditions (temperature, electrolytes, etc)  -not expected to make any meaningful recovery

## 2024-05-22 NOTE — PROVIDER CONTACT NOTE (CRITICAL VALUE NOTIFICATION) - SITUATION
workup in prg
Anaerobic bottles from 5/19- growing gram positive rods
post arrest pt, workup in prog

## 2024-05-22 NOTE — PROGRESS NOTE ADULT - PROBLEM SELECTOR PLAN 5
.  Patient is DNR/DNI, MOLST in chart  -daughter is surrogate decision maker in the absence of any known HCP  -children would be acting collectively but one son is unreachable and one daughter is estranged    In addition to the E/M visit, an advance care planning meeting was performed. Start time: 11:10AM; End time: 11:26AM; Total time: 16min. A face to face meeting to discuss advance care planning was held today regarding: BENNY SORIANO. Primary decision maker: Patient is unable to participate in decision making; Alternate/surrogate: Daughter. Discussed advance directives including, but not limited to, healthcare proxy and code status. Decision regarding code status: DNR/DNI; Documentation completed today: MOLST [facility] note
.  Patient is Full Code  -daughter is surrogate decision maker in the absence of any known HCP  -children would be acting collectively but one son is unreachable and one daughter is estranged  -ongoing rapport building for further advance care planning, will follow up tomorrow regarding code status

## 2024-05-22 NOTE — PROGRESS NOTE ADULT - SUBJECTIVE AND OBJECTIVE BOX
[facility]  Arnav Dos Santos, Palliative Care Attending  Contact Info: Call [phone] (HEAL Line) or message on Microsoft Teams (Arnav Dos Santos)    SUBJECTIVE AND OBJECTIVE:  INTERVAL HPI/OVERNIGHT EVENTS: Interval events noted. Intubated and encepahlopathic, unable to participate in interview. See patient's PRN use for the past 24hrs noted below. Comprehensive symptom assessment and GOC exploration as noted below. Extensive time spent discussing plan of care with family. Apneic on vent when switched to CPAP.    ALLERGIES:  No Known Allergies    MEDICATIONS  (STANDING):  cefTRIAXone   IVPB 2000 milliGRAM(s) IV Intermittent every 24 hours  chlorhexidine 0.12% Liquid 15 milliLiter(s) Oral Mucosa every 12 hours  chlorhexidine 2% Cloths 1 Application(s) Topical <User Schedule>  dextrose 5%. 1000 milliLiter(s) (150 mL/Hr) IV Continuous <Continuous>  enoxaparin Injectable 40 milliGRAM(s) SubCutaneous every 24 hours  norepinephrine Infusion 0.05 MICROgram(s)/kG/Min (8.88 mL/Hr) IV Continuous <Continuous>  pantoprazole  Injectable 40 milliGRAM(s) IV Push daily  rifAXIMin 550 milliGRAM(s) Oral two times a day  vancomycin  IVPB 1750 milliGRAM(s) IV Intermittent every 12 hours    MEDICATIONS  (PRN):  fentaNYL    Injectable 50 MICROGram(s) IV Push every 2 hours PRN severe pain/vent dyssynchrony    Analgesic Use (Scheduled and PRNs) for past 24 hours:  - none    ITEMS UNCHECKED ARE NOT PRESENT  PRESENT SYMPTOMS/REVIEW OF SYSTEMS: [x]Unable to obtain due to poor mentation   Source if other than patient:  []Family   []Team     Pain: [] yes [x] no  QOL impact -  Location -  Aggravating factors -  Quality -  Radiation -  Timing -  Severity (0-10 scale) -  Minimal acceptable level (0-10 scale) -    PAINAD Score: 0  CPOT Score: 0    Dyspnea:                           []Mild  []Moderate []Severe  Anxiety:                             []Mild []Moderate []Severe  Fatigue:                             []Mild []Moderate []Severe  Nausea:                             []Mild []Moderate []Severe  Loss of appetite:              []Mild []Moderate []Severe  Constipation:                    []Mild []Moderate []Severe    Other Symptoms:  [x]All other review of systems negative     Palliative Performance Status Version 2: 10%    GENERAL:  [] NAD []Alert []Lethargic  []Cachexia  [x]Unarousable  []Verbal  [x]Non-Verbal  BEHAVIORAL:   []Anxiety  [x]Delirium []Agitation []Cooperative []Oriented x  HEENT:  []Normal  [] Moist Mucous Membranes []Dry mouth   [x]ET Tube/Trach  []Oral lesions  PULMONARY:   []Clear []Tachypnea  []Audible excessive secretions  []Normal Work of Breathing []Labored Breathing  [x]Rhonchi []Crackles []Wheezing  CARDIOVASCULAR:    [x]Regular Rate [x]Regular Rhythm []Irregular []Tachy  []Kenji  GASTROINTESTINAL:  [x]Soft  [x]Distended   [x]+BS  [x]Non tender []Tender  []PEG [x]OGT/ NGT  Last BM:  GENITOURINARY:  []Normal [] Incontinent   []Oliguria/Anuria   [x]Stringer  MUSCULOSKELETAL:   []Normal Extremities  [x]Weakness  [x]Bed/Wheelchair bound []Edema  NEUROLOGIC:   []No focal deficits  []Cognitive impairment  [x]Dysphagia []Dysarthria []Paresis [x]Encephalopathic  SKIN:   [x]Normal   []Pressure ulcer(s)  []Rash    CRITICAL CARE:  []Shock Present  [ ]Septic [ ]Cardiogenic [ ]Neurologic [ ]Hypovolemic  [] Vasopressors [ ]Inotropes   [x]Respiratory failure present [x]Mechanical Ventilation [ ]Non-invasive ventilatory support [ ]High-Flow  [x]Acute  [ ]Chronic [x]Hypoxic  [ ]Hypercarbic   [ ]Other organ failure    Vital Signs Last 24 Hrs  T(C): 35.2 (22 May 2024 09:00), Max: 37.6 (21 May 2024 22:16)  T(F): 95.4 (22 May 2024 09:00), Max: 99.7 (21 May 2024 22:16)  HR: 82 (22 May 2024 11:00) (82 - 132)  BP: 93/52 (22 May 2024 09:00) (93/52 - 133/63)  BP(mean): 67 (22 May 2024 09:00) (67 - 91)  RR: 20 (22 May 2024 11:00) (18 - 21)  SpO2: 98% (22 May 2024 11:00) (98% - 100%)    Parameters below as of 22 May 2024 11:00  Patient On (Oxygen Delivery Method): ventilator  O2 Concentration (%): 30    LABS: Personally reviewed and interpreted                      9.9    12.44 )-----------( 206      ( 22 May 2024 05:30 )             32.7   05-22    159<H>  |  128<H>  |  12  ----------------------------<  208<H>  3.6   |  23  |  0.73    Ca    7.6<L>      22 May 2024 05:30  Phos  1.8     05-22  Mg     2.4     05-22  TPro  4.8<L>  /  Alb  2.4<L>  /  TBili  0.2  /  DBili  x   /  AST  331<H>  /  ALT  200<H>  /  AlkPhos  61  05-22    RADIOLOGY & ADDITIONAL STUDIES: Personally reviewed and interpreted  EEG Report  •	There was suppression of cerebral activity, with only a possibility of cerebral activity seen over the right more than left temporal region, though could also be muscle artifact.     •	The invariant background is an indicator of severe cerebral dysfunction.  The attenuation suggests cortical dysfunction or alternatively extra-cerebral absorption of electrical activity such as fluid collections.    DISCUSSION OF CASE: Daughter - to provide updates and emotional support; Primary Team/RN - to discuss plan of care

## 2024-05-22 NOTE — PROGRESS NOTE ADULT - ASSESSMENT
Assessment: 44 pmhx of bipolar (on lithium), htn (hctz), HLD, RA, GERD, hemorrhoids, MARCELL, lupus and fibromyalgia found to be in cardiac arrest possible 2/2 drug overdose (cocaine w/ possibly laced with fentanyl) admitted to the micu for post resuscitation care.    Neuro  #Anoxic brain injury 2/2 to cardiac arrest  UTox positive for cocaine.  CT Head - effacement of the cerebral sulci, ventricles and cisternal spaces secondary to cerebral edema. Findings consistent with anoxic brain injury. s/p 23% saline x1, and mannitol 100 x1  Na 163    - neurology consulted f/u recs  - neurosurgery consulted f/u recs  - palliative consulted f/u recs  - anoxic brain injury, MAP >80  - goal sodium 150-160 5/22  - c/w dextrose 5 150cc/hr, fwf 350cc q3  - q4 BMP  - apnea test, if negative consider HMPAO spect/cerebral angiogram/transcranial doppler  - targeted temperature management, maintain normothermia (32 - 37.5C)  - vEEG  - f/u neuron specific enolase (May 20/21/22)    #Anxiety / Depression  #Bipolar 1 Diagnosis  On lithium 450mg in am and 450mg qpm. lithium level low at 0.26. On hctz. Lithium levels should be elevated iso of hctz use, however level indicates underdosing. Unknown last change in regimen. Last refilled 4/25/24. Psychiatrist at [facility].  - hold home med    #Intubated  - off sedation    #C/f hepatic encephalopathy (resolved)  - d/yordan lactulose 20g q6  - c/w rifaximin 550 bid    Pulm  #Intubation  On ventilator.  Current settings: FIO2 40; ; RR 20; PEEP 5  ABG with elevation in CO2 5/21  - increased RR to 20 from 18    Cards  #Cardiac Arrest  ACLS x2. ROSC obtained.  UTox positive for cocaine; however no signs of vasospasm > acute coronary syndrome with negative trops and non-ischemic EKG. High suspicion of fentanyl overdose leading to respiratory depression > hypercapnia > acidosis > cardiac arrest  - f/u fentanyl level  - Pacer pads in place   - follow cerebral edema/neuroprotective protocol outlined above     #HTN  home medications Losartan 100mg, HCTZ 25mg, Norvasc 6.25 BID   - holding home meds    #HLD  home medication Atorvastatin 40mg    - holding home Lipitor 40    GI  #Transaminitis  #C/f liver laceration  Likely shock liver i/s/o cardiac arrest. Distension on physical exam  AST/ALT ratio 1:1; serum alcohol negative. unlikely alcohol induced liver injury  CTAP: 5cm hypodense finding of liver representing irregular focal fatty infiltration vs hepatic laceration  Maps decreasing 5/22 likely 2/2 increased UOP, less likely 2/2 bleed  - serial abdominal exams    #Ileus  likely 2/2 to fentanyl overdose. Found with drug paraphernalia nearby.  CT Abdomen -colon dilated to 8 cm with gas and semi solid/liquid stool. Stomach distended with gas and fluid.   - OG decompression  - rectal tube    #Chronic constipation  Home medications: Dulcolax and Miralax qd for constipation    #Chronic GERD  Home medication: omeprazole 40 qd    Renal  cristóbal    Rheum  #RA  Managed on Humira 40mg, MTX 2.5mg, and Naproxen PRN.    - hold Humira, MTX, leukovorin, and Naproxen    Endo  CRISTÓBAL    ID  #C/f staph epidermidis bacteremia  Blood culture revealing staph epidermidis,  - on vancomycin  - f/u surveillance blood culture    Heme  #Chronic MARCELL  Low HGB as of 08/2023, per outside records  Home medication: Ferrous Sulfate    PPX  E: Replete  N: NPO  GI: ppi qD  DVT ppx: lovenox  Dispo: MICU     FULL CODE Assessment: 44 pmhx of bipolar (on lithium), htn (hctz), HLD, RA, GERD, hemorrhoids, MARCELL, lupus and fibromyalgia found to be in cardiac arrest possible 2/2 drug overdose (cocaine w/ possibly laced with fentanyl) admitted to the micu for post resuscitation care.    Neuro  #Brain death  5/22 performed 72 hours post cardiac resuscitation. Patient without confounding conditions  Coma confirmed without sign of arousal, or response to noxious stimulus. Brainstem areflexia confirmed with absence of pupilar light reflex, vestibuloocular reflex, corneal reflex, gag reflex, cough reflex. Positive apnea testing performed w/ preoxygenation with 100% fio2, pCO2 greater that 60. No further ancilllary brain death testing.    #Anoxic brain injury 2/2 to cardiac arrest  UTox positive for cocaine.  CT Head - effacement of the cerebral sulci, ventricles and cisternal spaces secondary to cerebral edema. Findings consistent with anoxic brain injury. s/p 23% saline x1, and mannitol 100 x1  Na 163 5/21 ovn, started on d5 gtt    - neurology consulted f/u recs  - neurosurgery consulted f/u recs  - palliative consulted f/u recs  - anoxic brain injury, MAP >80  - goal sodium 150-160 5/22  - c/w dextrose 5 150cc/hr, fwf 350cc q3  - q4 BMP  - targeted temperature management, maintain normothermia (32 - 37.5C)  - vEEG  - f/u neuron specific enolase (May 20/21/22)    #Anxiety / Depression  #Bipolar 1 Diagnosis  On lithium 450mg in am and 450mg qpm. lithium level low at 0.26. On hctz. Lithium levels should be elevated iso of hctz use, however level indicates underdosing. Unknown last change in regimen. Last refilled 4/25/24. Psychiatrist at [facility].  - hold home med    #Intubated  - off sedation    #C/f hepatic encephalopathy (resolved)  - d/yordan lactulose 20g q6  - c/w rifaximin 550 bid    Pulm  #Intubation  On ventilator.  Current settings: FIO2 40; ; RR 20; PEEP 5  ABG with elevation in CO2 5/21  - increased RR to 20 from 18    Cards  #Cardiac Arrest  ACLS x2. ROSC obtained.  UTox positive for cocaine; however no signs of vasospasm > acute coronary syndrome with negative trops and non-ischemic EKG. High suspicion of fentanyl overdose leading to respiratory depression > hypercapnia > acidosis > cardiac arrest  - f/u fentanyl level  - Pacer pads in place   - follow cerebral edema/neuroprotective protocol outlined above     #HTN  home medications Losartan 100mg, HCTZ 25mg, Norvasc 6.25 BID   - holding home meds    #HLD  home medication Atorvastatin 40mg    - holding home Lipitor 40    GI  #Transaminitis  #C/f liver laceration  Likely shock liver i/s/o cardiac arrest. Distension on physical exam  AST/ALT ratio 1:1; serum alcohol negative. unlikely alcohol induced liver injury  CTAP: 5cm hypodense finding of liver representing irregular focal fatty infiltration vs hepatic laceration  Maps decreasing 5/22 likely 2/2 increased UOP, less likely 2/2 bleed  - serial abdominal exams    #Ileus  likely 2/2 to fentanyl overdose. Found with drug paraphernalia nearby.  CT Abdomen -colon dilated to 8 cm with gas and semi solid/liquid stool. Stomach distended with gas and fluid.   - OG decompression  - rectal tube    #Chronic constipation  Home medications: Dulcolax and Miralax qd for constipation    #Chronic GERD  Home medication: omeprazole 40 qd    Renal  cristóbal    Rheum  #RA  Managed on Humira 40mg, MTX 2.5mg, and Naproxen PRN.    - hold Humira, MTX, leukovorin, and Naproxen    Endo  CRISTÓBAL    ID  #C/f staph epidermidis bacteremia  Blood culture revealing staph epidermidis,  - on vancomycin  - f/u surveillance blood culture    Heme  #Chronic MARCELL  Low HGB as of 08/2023, per outside records  Home medication: Ferrous Sulfate    PPX  E: Replete  N: NPO  GI: ppi qD  DVT ppx: lovenox  Dispo: MICU     FULL CODE Assessment: 44 pmhx of bipolar (on lithium), htn (hctz), HLD, RA, GERD, hemorrhoids, MARCELL, lupus and fibromyalgia found to be in cardiac arrest possible 2/2 drug overdose (cocaine w/ possibly laced with fentanyl) admitted to the micu for post resuscitation care.    Neuro  #Brain death  5/22 performed 72 hours post cardiac resuscitation. Patient without confounding conditions  Coma confirmed without sign of arousal, or response to noxious stimulus (nailbed). Brainstem areflexia confirmed with absence of pupilar light reflex, vestibuloocular reflex, corneal reflex, gag reflex, cough reflex. Positive apnea testing performed w/ preoxygenation with 100% fio2, pCO2 greater that 60. No further ancilllary brain death testing.    #Anoxic brain injury 2/2 to cardiac arrest  UTox positive for cocaine.  CT Head - effacement of the cerebral sulci, ventricles and cisternal spaces secondary to cerebral edema. Findings consistent with anoxic brain injury. s/p 23% saline x1, and mannitol 100 x1  Na 163 5/21 ovn, started on d5 gtt    - neurology consulted f/u recs  - neurosurgery consulted f/u recs  - palliative consulted f/u recs  - anoxic brain injury, MAP >80  - goal sodium 150-160 5/22  - c/w dextrose 5 150cc/hr, fwf 350cc q3  - q4 BMP  - targeted temperature management, maintain normothermia (32 - 37.5C)  - vEEG  - f/u neuron specific enolase (May 20/21/22)    #Anxiety / Depression  #Bipolar 1 Diagnosis  On lithium 450mg in am and 450mg qpm. lithium level low at 0.26. On hctz. Lithium levels should be elevated iso of hctz use, however level indicates underdosing. Unknown last change in regimen. Last refilled 4/25/24. Psychiatrist at [facility].  - hold home med    #Intubated  - off sedation    #C/f hepatic encephalopathy (resolved)  - d/yordan lactulose 20g q6  - c/w rifaximin 550 bid    Pulm  #Intubation  On ventilator.  Current settings: FIO2 40; ; RR 20; PEEP 5  ABG with elevation in CO2 5/21  - increased RR to 20 from 18    Cards  #Cardiac Arrest  ACLS x2. ROSC obtained.  UTox positive for cocaine; however no signs of vasospasm > acute coronary syndrome with negative trops and non-ischemic EKG. High suspicion of fentanyl overdose leading to respiratory depression > hypercapnia > acidosis > cardiac arrest  - f/u fentanyl level  - Pacer pads in place   - follow cerebral edema/neuroprotective protocol outlined above     #HTN  home medications Losartan 100mg, HCTZ 25mg, Norvasc 6.25 BID   - holding home meds    #HLD  home medication Atorvastatin 40mg    - holding home Lipitor 40    GI  #Transaminitis  #C/f liver laceration  Likely shock liver i/s/o cardiac arrest. Distension on physical exam  AST/ALT ratio 1:1; serum alcohol negative. unlikely alcohol induced liver injury  CTAP: 5cm hypodense finding of liver representing irregular focal fatty infiltration vs hepatic laceration  Maps decreasing 5/22 likely 2/2 increased UOP, less likely 2/2 bleed  - serial abdominal exams    #Ileus  likely 2/2 to fentanyl overdose. Found with drug paraphernalia nearby.  CT Abdomen -colon dilated to 8 cm with gas and semi solid/liquid stool. Stomach distended with gas and fluid.   - OG decompression  - rectal tube    #Chronic constipation  Home medications: Dulcolax and Miralax qd for constipation    #Chronic GERD  Home medication: omeprazole 40 qd    Renal  cristóbal    Rheum  #RA  Managed on Humira 40mg, MTX 2.5mg, and Naproxen PRN.    - hold Humira, MTX, leukovorin, and Naproxen    Endo  CRISTÓBAL    ID  #C/f staph epidermidis bacteremia  Blood culture revealing staph epidermidis,  - on vancomycin  - f/u surveillance blood culture    Heme  #Chronic MARCELL  Low HGB as of 08/2023, per outside records  Home medication: Ferrous Sulfate    PPX  E: Replete  N: NPO  GI: ppi qD  DVT ppx: lovenox  Dispo: MICU     FULL CODE

## 2024-05-22 NOTE — MEDICAL STUDENT PROGRESS NOTE(EDUCATION) - ASSESSMENT
45yo F post cardiac arrest suspicious for substance intox s/p Narcan (zero effect), s/p ACLS x 2 (15mins in the field and 1 round ED) w/ ROSC s/p 2 rounds of EPI and a 3rd for hypotension. intubated/sedated. Lactic acidosis, transaminitis, hyperammonemia, (+) cocaine. CTH w/ sulci effacement, tight ventricles and unclear grey-white differentiation, No hemorrhage or trauma. Neurology consulted for initial assessment to neuroprognostication. Neuroimaging is consistent w/ anoxic brain injury 2/2 cardiac arrest vs cerebral edema 2/ hyperammonemia. On Exam comatose, on cooling blanket, otherwise absent BSR, despite mannitol and 23% NaCl injection. Pt noted to be bacteremic. At this time prognosis remained guarded. Neurosurgery eval'd, no acute intervention, NSICU on board. Desmopressin, LR, dextrose given for sodium 163? Sodium 159 on morning labs. RD eval'd, recommended enteral nutrition if within goals of care. Palliative eval'd, they plan to discuss code status with family today.    Recommendations  - Neuro and Vitals assessment per MICU  - monitor clinically for signs of seizure  - Treatment of bacteremia/electrolyte abnormalities per primary team  - Sodium goal 150-155  - C/w vEEG monitoring  - Obtain NSE 24, 48, and 72 hours  - Repeat CTH today if MR w/wo cannot be completed  - Obtain MR Brain w/wo when able   - C/w hypertonic saline as per NSICU

## 2024-05-22 NOTE — PROGRESS NOTE ADULT - ASSESSMENT
Assessment and Plan:    Assessment:	  43yo F post cardiac arrest suspicious for substance intox s/p Narcan (zero effect), s/p ACLS x 2 (15mins in the field and 1 round ED) w/ ROSC s/p 2 rounds of EPI and a 3rd for hypotension. intubated/sedated. Lactic acidosis, transaminitis, hyperammonemia, (+) cocaine. CTH w/ sulci effacement, tight ventricles and unclear grey-white differentiation, No hemorrhage or trauma. Neurology consulted for initial assessment to neuroprognostication. Neuroimaging is consistent w/ anoxic brain injury 2/2 cardiac arrest vs cerebral edema 2/ hyperammonemia. On Exam comatose, on cooling blanket, otherwise absent BSR, despite mannitol and 23% NaCl injection. Pt noted to be bacteremic. At this time prognosis remained guarded. Neurosurgery eval'd, no acute intervention, NSICU on board. Desmopressin, LR, dextrose given for sodium 163 overnight. Sodium 159 on morning labs. RD eval'd, recommended enteral nutrition if within goals of care. Palliative eval'd, they plan to discuss code status with family today.    Recommendations  - Neuro and Vitals assessment per MICU  - monitor clinically for signs of seizure  - Treatment of bacteremia/electrolyte abnormalities per primary team  - Sodium goal 150-155  - C/w vEEG monitoring  - Obtain NSE 24, 48, and 72 hours  - Repeat CTH today if MR w/wo cannot be completed  - Obtain MR Brain w/wo when able   - C/w hypertonic saline as per NSICU Assessment and Plan:    Assessment:	  43yo F post cardiac arrest suspicious for substance intox s/p Narcan (zero effect), s/p ACLS x 2 (15mins in the field and 1 round ED) w/ ROSC s/p 2 rounds of EPI and a 3rd for hypotension. intubated/sedated. Lactic acidosis, transaminitis, hyperammonemia, (+) cocaine. CTH w/ sulci effacement, tight ventricles and unclear grey-white differentiation, No hemorrhage or trauma. Neurology consulted for initial assessment to neuroprognostication. Neuroimaging is consistent w/ anoxic brain injury 2/2 cardiac arrest vs cerebral edema 2/ hyperammonemia. On Exam comatose, on cooling blanket, otherwise absent BSR, despite mannitol and 23% NaCl injection. Pt noted to be bacteremic. At this time prognosis remained guarded. Neurosurgery eval'd, no acute intervention, NSICU on board. Desmopressin, LR, dextrose given for sodium 163 overnight. Sodium 159 on morning labs. RD eval'd, recommended enteral nutrition if within goals of care. Palliative eval'd, they plan to discuss code status with family today.    Recommendations  - Neuro and Vitals assessment per MICU  - monitor clinically for signs of seizure  - Treatment of bacteremia/electrolyte abnormalities per primary team  - For prognostication, patient must be above 36°C, with MAP >60  - Sodium goal 150-155  - C/w vEEG monitoring  - Obtain NSE 24, 48, and 72 hours  - Repeat CTH today if MR w/wo cannot be completed  - Obtain MR Brain w/wo when able   - C/w hypertonic saline as per NSICU

## 2024-05-22 NOTE — PROCEDURE NOTE - ADDITIONAL PROCEDURE DETAILS
Attempted right radial artery A line placement, unsuccessful. A line placed successfully in left axillary artery.
Lung sliding confirmed before and after CVC insertion. Bubble study with saline also performed under US.
USGPIV placement with lab draw

## 2024-05-22 NOTE — PROGRESS NOTE ADULT - SUBJECTIVE AND OBJECTIVE BOX
Hospital Course:  44M pmhx of bipolar, htn, HLD, RA, GERD, hemorrhoids, MARCELL, lupus and fibromyalgia found to be in cardiac arrest possible 2/2 drug overdose (cocaine w/ possibly laced with fentanyl) admitted to the micu for post resuscitation care. CardiacCT head w anoxic brain injury. Neurology and neurosurgery consulted.    INTERVAL HPI/OVERNIGHT EVENTS: obtained surveillance cultures. 10pm bmp w/ serum Na 161, free water deficit of 0.7L to get to 155. will give 300cc D5 and started 150 FW PO Q6, repeat Na 163 increased rate to 125 cc/hr. Consulted nephrology regarding concern fro central DI - said is likely hypervolemic hypernatremia. gave 2 mcg ddAVP, increased D5 to 150 cc/hr, and free water flushes to 350 Q3h, map fell to 60 gave bolus LR then NS via pressure bag, delaying labs to 6:30 Transiently required levo 0.05.    SUBJECTIVE: Patient seen and examined at bedside.    VITAL SIGNS:  ICU Vital Signs Last 24 Hrs  T(C): 35.2 (22 May 2024 09:00), Max: 37.6 (21 May 2024 22:16)  T(F): 95.4 (22 May 2024 09:00), Max: 99.7 (21 May 2024 22:16)  HR: 99 (22 May 2024 14:00) (80 - 132)  BP: 93/52 (22 May 2024 09:00) (93/52 - 118/56)  BP(mean): 67 (22 May 2024 09:00) (67 - 81)  ABP: 118/68 (22 May 2024 14:00) (88/51 - 125/71)  ABP(mean): 91 (22 May 2024 14:00) (68 - 95)  RR: 20 (22 May 2024 14:00) (18 - 21)  SpO2: 97% (22 May 2024 14:00) (96% - 100%)    O2 Parameters below as of 22 May 2024 14:00  Patient On (Oxygen Delivery Method): ventilator    O2 Concentration (%): 30    Mode: AC/ CMV (Assist Control/ Continuous Mandatory Ventilation), RR (machine): 20, TV (machine): 420, FiO2: 30, PEEP: 5, ITime: 0.8, MAP: 9.2, PIP: 21    05-21 @ 07:01  -  05-22 @ 07:00  --------------------------------------------------------  IN: 4466 mL / OUT: 3815 mL / NET: 651 mL    05-22 @ 07:01 - 05-22 @ 14:26  --------------------------------------------------------  IN: 2303.5 mL / OUT: 293 mL / NET: 2010.5 mL    CAPILLARY BLOOD GLUCOSE    POCT Blood Glucose.: 119 mg/dL (21 May 2024 17:33)    PHYSICAL EXAM:  Constitutional: intubated, obtunded  HEENT: OD 5mm fixed, OS 7mm fixed, no oculocephalic reflex, no cough reflex  Neck: supple, no JVD  Cardiovascular: rrr  Respiratory: ctab  Gastrointestinal: tense, distended, tympanic  Extremities: upper and lower extremities cool to touch distally, warm to touch proximal  Vascular: 2+ radial, DP/PT and femoral pulses B/L  Dermatologic: normal color and turgor; no visible rashes    MEDICATIONS:  MEDICATIONS  (STANDING):  cefTRIAXone   IVPB 2000 milliGRAM(s) IV Intermittent every 24 hours  chlorhexidine 0.12% Liquid 15 milliLiter(s) Oral Mucosa every 12 hours  chlorhexidine 2% Cloths 1 Application(s) Topical <User Schedule>  dextrose 5%. 1000 milliLiter(s) (150 mL/Hr) IV Continuous <Continuous>  enoxaparin Injectable 40 milliGRAM(s) SubCutaneous every 24 hours  norepinephrine Infusion 0.05 MICROgram(s)/kG/Min (8.88 mL/Hr) IV Continuous <Continuous>  pantoprazole  Injectable 40 milliGRAM(s) IV Push daily  rifAXIMin 550 milliGRAM(s) Oral two times a day  vancomycin  IVPB 1750 milliGRAM(s) IV Intermittent every 12 hours    MEDICATIONS  (PRN):  fentaNYL    Injectable 50 MICROGram(s) IV Push every 2 hours PRN severe pain/vent dyssynchrony      LABS:                        9.9    12.44 )-----------( 206      ( 22 May 2024 05:30 )             32.7     05-22    159<H>  |  128<H>  |  12  ----------------------------<  208<H>  3.6   |  23  |  0.73    Ca    7.6<L>      22 May 2024 05:30  Phos  1.8     05-22  Mg     2.4     05-22    TPro  4.8<L>  /  Alb  2.4<L>  /  TBili  0.2  /  DBili  x   /  AST  331<H>  /  ALT  200<H>  /  AlkPhos  61  05-22    PT/INR - ( 21 May 2024 16:08 )   PT: 11.5 sec;   INR: 1.01          PTT - ( 21 May 2024 16:08 )  PTT:29.3 sec  Urinalysis Basic - ( 22 May 2024 05:30 )    Color: x / Appearance: x / SG: x / pH: x  Gluc: 208 mg/dL / Ketone: x  / Bili: x / Urobili: x   Blood: x / Protein: x / Nitrite: x   Leuk Esterase: x / RBC: x / WBC x   Sq Epi: x / Non Sq Epi: x / Bacteria: x    RADIOLOGY & ADDITIONAL TESTS: Reviewed. Hospital Course:  44M pmhx of bipolar, htn, HLD, RA, GERD, hemorrhoids, MARCELL, lupus and fibromyalgia found to be in cardiac arrest possible 2/2 drug overdose (cocaine w/ possibly laced with fentanyl) admitted to the micu for post resuscitation care. CT head w anoxic brain injury. Neurology and neurosurgery consulted. Not a surgical candidate. Concern for cerebral edema, patient received hypertonic saline to reach sodium goal. 5/21, sodium above 160, started on d5. Nephrology consulted, given ddavp. Patient maintained on normothermia. Apnea test on 5/22.    INTERVAL HPI/OVERNIGHT EVENTS: obtained surveillance cultures. 10pm bmp w/ serum Na 161, free water deficit of 0.7L to get to 155. will give 300cc D5 and started 150 FW PO Q6, repeat Na 163 increased rate to 125 cc/hr. Consulted nephrology regarding concern fro central DI - said is likely hypervolemic hypernatremia. gave 2 mcg ddAVP, increased D5 to 150 cc/hr, and free water flushes to 350 Q3h, map fell to 60 gave bolus LR then NS via pressure bag, delaying labs to 6:30 Transiently required levo 0.05.    SUBJECTIVE: Patient seen and examined at bedside.    VITAL SIGNS:  ICU Vital Signs Last 24 Hrs  T(C): 35.2 (22 May 2024 09:00), Max: 37.6 (21 May 2024 22:16)  T(F): 95.4 (22 May 2024 09:00), Max: 99.7 (21 May 2024 22:16)  HR: 99 (22 May 2024 14:00) (80 - 132)  BP: 93/52 (22 May 2024 09:00) (93/52 - 118/56)  BP(mean): 67 (22 May 2024 09:00) (67 - 81)  ABP: 118/68 (22 May 2024 14:00) (88/51 - 125/71)  ABP(mean): 91 (22 May 2024 14:00) (68 - 95)  RR: 20 (22 May 2024 14:00) (18 - 21)  SpO2: 97% (22 May 2024 14:00) (96% - 100%)    O2 Parameters below as of 22 May 2024 14:00  Patient On (Oxygen Delivery Method): ventilator    O2 Concentration (%): 30    Mode: AC/ CMV (Assist Control/ Continuous Mandatory Ventilation), RR (machine): 20, TV (machine): 420, FiO2: 30, PEEP: 5, ITime: 0.8, MAP: 9.2, PIP: 21    05-21 @ 07:01  - 05-22 @ 07:00  --------------------------------------------------------  IN: 4466 mL / OUT: 3815 mL / NET: 651 mL    05-22 @ 07:01 - 05-22 @ 14:26  --------------------------------------------------------  IN: 2303.5 mL / OUT: 293 mL / NET: 2010.5 mL    CAPILLARY BLOOD GLUCOSE    POCT Blood Glucose.: 119 mg/dL (21 May 2024 17:33)    PHYSICAL EXAM:  Constitutional: intubated, obtunded  HEENT: OD 5mm fixed, OS 7mm fixed, no oculocephalic reflex, no cough reflex  Neck: supple, no JVD  Cardiovascular: rrr  Respiratory: ctab  Gastrointestinal: tense, distended, tympanic  Extremities: upper and lower extremities cool to touch distally, warm to touch proximal  Vascular: 2+ radial, DP/PT and femoral pulses B/L  Dermatologic: normal color and turgor; no visible rashes    MEDICATIONS:  MEDICATIONS  (STANDING):  cefTRIAXone   IVPB 2000 milliGRAM(s) IV Intermittent every 24 hours  chlorhexidine 0.12% Liquid 15 milliLiter(s) Oral Mucosa every 12 hours  chlorhexidine 2% Cloths 1 Application(s) Topical <User Schedule>  dextrose 5%. 1000 milliLiter(s) (150 mL/Hr) IV Continuous <Continuous>  enoxaparin Injectable 40 milliGRAM(s) SubCutaneous every 24 hours  norepinephrine Infusion 0.05 MICROgram(s)/kG/Min (8.88 mL/Hr) IV Continuous <Continuous>  pantoprazole  Injectable 40 milliGRAM(s) IV Push daily  rifAXIMin 550 milliGRAM(s) Oral two times a day  vancomycin  IVPB 1750 milliGRAM(s) IV Intermittent every 12 hours    MEDICATIONS  (PRN):  fentaNYL    Injectable 50 MICROGram(s) IV Push every 2 hours PRN severe pain/vent dyssynchrony      LABS:                        9.9    12.44 )-----------( 206      ( 22 May 2024 05:30 )             32.7     05-22    159<H>  |  128<H>  |  12  ----------------------------<  208<H>  3.6   |  23  |  0.73    Ca    7.6<L>      22 May 2024 05:30  Phos  1.8     05-22  Mg     2.4     05-22    TPro  4.8<L>  /  Alb  2.4<L>  /  TBili  0.2  /  DBili  x   /  AST  331<H>  /  ALT  200<H>  /  AlkPhos  61  05-22    PT/INR - ( 21 May 2024 16:08 )   PT: 11.5 sec;   INR: 1.01          PTT - ( 21 May 2024 16:08 )  PTT:29.3 sec  Urinalysis Basic - ( 22 May 2024 05:30 )    Color: x / Appearance: x / SG: x / pH: x  Gluc: 208 mg/dL / Ketone: x  / Bili: x / Urobili: x   Blood: x / Protein: x / Nitrite: x   Leuk Esterase: x / RBC: x / WBC x   Sq Epi: x / Non Sq Epi: x / Bacteria: x    RADIOLOGY & ADDITIONAL TESTS: Reviewed. Hospital Course:  44M pmhx of bipolar, htn, HLD, RA, GERD, hemorrhoids, MARCELL, lupus and fibromyalgia found to be in cardiac arrest possible 2/2 drug overdose (cocaine w/ possibly laced with fentanyl) admitted to the micu for post resuscitation care. CT head w anoxic brain injury. Neurology and neurosurgery consulted. Not a surgical candidate. Concern for cerebral edema, patient received hypertonic saline to reach sodium goal. 5/21, sodium above 160, started on d5. Nephrology consulted, given ddavp. Patient maintained on normothermia. Brainstem w/ areflexia. Apnea test positive on 5/22.    INTERVAL HPI/OVERNIGHT EVENTS: obtained surveillance cultures. 10pm bmp w/ serum Na 161, free water deficit of 0.7L to get to 155. will give 300cc D5 and started 150 FW PO Q6, repeat Na 163 increased rate to 125 cc/hr. Consulted nephrology regarding concern fro central DI - said is likely hypervolemic hypernatremia. gave 2 mcg ddAVP, increased D5 to 150 cc/hr, and free water flushes to 350 Q3h, map fell to 60 gave bolus LR then NS via pressure bag, delaying labs to 6:30 Transiently required levo 0.05.    SUBJECTIVE: Patient seen and examined at bedside.    VITAL SIGNS:  ICU Vital Signs Last 24 Hrs  T(C): 35.2 (22 May 2024 09:00), Max: 37.6 (21 May 2024 22:16)  T(F): 95.4 (22 May 2024 09:00), Max: 99.7 (21 May 2024 22:16)  HR: 99 (22 May 2024 14:00) (80 - 132)  BP: 93/52 (22 May 2024 09:00) (93/52 - 118/56)  BP(mean): 67 (22 May 2024 09:00) (67 - 81)  ABP: 118/68 (22 May 2024 14:00) (88/51 - 125/71)  ABP(mean): 91 (22 May 2024 14:00) (68 - 95)  RR: 20 (22 May 2024 14:00) (18 - 21)  SpO2: 97% (22 May 2024 14:00) (96% - 100%)    O2 Parameters below as of 22 May 2024 14:00  Patient On (Oxygen Delivery Method): ventilator    O2 Concentration (%): 30    Mode: AC/ CMV (Assist Control/ Continuous Mandatory Ventilation), RR (machine): 20, TV (machine): 420, FiO2: 30, PEEP: 5, ITime: 0.8, MAP: 9.2, PIP: 21    05-21 @ 07:01  - 05-22 @ 07:00  --------------------------------------------------------  IN: 4466 mL / OUT: 3815 mL / NET: 651 mL    05-22 @ 07:01 - 05-22 @ 14:26  --------------------------------------------------------  IN: 2303.5 mL / OUT: 293 mL / NET: 2010.5 mL    CAPILLARY BLOOD GLUCOSE    POCT Blood Glucose.: 119 mg/dL (21 May 2024 17:33)    PHYSICAL EXAM:  Constitutional: intubated, obtunded  HEENT: OD 5mm fixed, OS 7mm fixed, no oculocephalic reflex, no cough reflex  Neck: supple, no JVD  Cardiovascular: rrr  Respiratory: ctab  Gastrointestinal: tense, distended, tympanic  Extremities: upper and lower extremities cool to touch distally, warm to touch proximal  Vascular: 2+ radial, DP/PT and femoral pulses B/L  Dermatologic: normal color and turgor; no visible rashes    MEDICATIONS:  MEDICATIONS  (STANDING):  cefTRIAXone   IVPB 2000 milliGRAM(s) IV Intermittent every 24 hours  chlorhexidine 0.12% Liquid 15 milliLiter(s) Oral Mucosa every 12 hours  chlorhexidine 2% Cloths 1 Application(s) Topical <User Schedule>  dextrose 5%. 1000 milliLiter(s) (150 mL/Hr) IV Continuous <Continuous>  enoxaparin Injectable 40 milliGRAM(s) SubCutaneous every 24 hours  norepinephrine Infusion 0.05 MICROgram(s)/kG/Min (8.88 mL/Hr) IV Continuous <Continuous>  pantoprazole  Injectable 40 milliGRAM(s) IV Push daily  rifAXIMin 550 milliGRAM(s) Oral two times a day  vancomycin  IVPB 1750 milliGRAM(s) IV Intermittent every 12 hours    MEDICATIONS  (PRN):  fentaNYL    Injectable 50 MICROGram(s) IV Push every 2 hours PRN severe pain/vent dyssynchrony      LABS:                        9.9    12.44 )-----------( 206      ( 22 May 2024 05:30 )             32.7     05-22    159<H>  |  128<H>  |  12  ----------------------------<  208<H>  3.6   |  23  |  0.73    Ca    7.6<L>      22 May 2024 05:30  Phos  1.8     05-22  Mg     2.4     05-22    TPro  4.8<L>  /  Alb  2.4<L>  /  TBili  0.2  /  DBili  x   /  AST  331<H>  /  ALT  200<H>  /  AlkPhos  61  05-22    PT/INR - ( 21 May 2024 16:08 )   PT: 11.5 sec;   INR: 1.01          PTT - ( 21 May 2024 16:08 )  PTT:29.3 sec  Urinalysis Basic - ( 22 May 2024 05:30 )    Color: x / Appearance: x / SG: x / pH: x  Gluc: 208 mg/dL / Ketone: x  / Bili: x / Urobili: x   Blood: x / Protein: x / Nitrite: x   Leuk Esterase: x / RBC: x / WBC x   Sq Epi: x / Non Sq Epi: x / Bacteria: x    RADIOLOGY & ADDITIONAL TESTS: Reviewed. Hospital Course:  44M pmhx of bipolar, htn, HLD, RA, GERD, hemorrhoids, MARCELL, lupus and fibromyalgia found to be in cardiac arrest possible 2/2 drug overdose (cocaine w/ possibly laced with fentanyl) admitted to the micu for post resuscitation care. Trops wnl, ekg nonischemic. CT head w anoxic brain injury. Neurology and neurosurgery consulted. Not a surgical candidate. Concern for cerebral edema, patient received hypertonic saline to reach sodium goal. 5/21, sodium above 160, started on d5. Nephrology consulted, given ddavp. Patient maintained on normothermia. Brainstem w/ areflexia. Apnea test positive on 5/22.    INTERVAL HPI/OVERNIGHT EVENTS: obtained surveillance cultures. 10pm bmp w/ serum Na 161, free water deficit of 0.7L to get to 155. will give 300cc D5 and started 150 FW PO Q6, repeat Na 163 increased rate to 125 cc/hr. Consulted nephrology regarding concern fro central DI - said is likely hypervolemic hypernatremia. gave 2 mcg ddAVP, increased D5 to 150 cc/hr, and free water flushes to 350 Q3h, map fell to 60 gave bolus LR then NS via pressure bag, delaying labs to 6:30 Transiently required levo 0.05.    SUBJECTIVE: Patient seen and examined at bedside. Subjective limited.    VITAL SIGNS:  ICU Vital Signs Last 24 Hrs  T(C): 35.2 (22 May 2024 09:00), Max: 37.6 (21 May 2024 22:16)  T(F): 95.4 (22 May 2024 09:00), Max: 99.7 (21 May 2024 22:16)  HR: 99 (22 May 2024 14:00) (80 - 132)  BP: 93/52 (22 May 2024 09:00) (93/52 - 118/56)  BP(mean): 67 (22 May 2024 09:00) (67 - 81)  ABP: 118/68 (22 May 2024 14:00) (88/51 - 125/71)  ABP(mean): 91 (22 May 2024 14:00) (68 - 95)  RR: 20 (22 May 2024 14:00) (18 - 21)  SpO2: 97% (22 May 2024 14:00) (96% - 100%)    O2 Parameters below as of 22 May 2024 14:00  Patient On (Oxygen Delivery Method): ventilator    O2 Concentration (%): 30    Mode: AC/ CMV (Assist Control/ Continuous Mandatory Ventilation), RR (machine): 20, TV (machine): 420, FiO2: 30, PEEP: 5, ITime: 0.8, MAP: 9.2, PIP: 21    05-21 @ 07:01  - 05-22 @ 07:00  --------------------------------------------------------  IN: 4466 mL / OUT: 3815 mL / NET: 651 mL    05-22 @ 07:01 - 05-22 @ 14:26  --------------------------------------------------------  IN: 2303.5 mL / OUT: 293 mL / NET: 2010.5 mL    CAPILLARY BLOOD GLUCOSE    POCT Blood Glucose.: 119 mg/dL (21 May 2024 17:33)    PHYSICAL EXAM:  Constitutional: intubated, obtunded  HEENT: OD 5mm fixed, OS 7mm fixed, no oculocephalic reflex, no cough reflex  Neck: supple, no JVD  Cardiovascular: rrr  Respiratory: ctab  Gastrointestinal: tense, distended, tympanic  Extremities: upper and lower extremities cool to touch distally, warm to touch proximal  Vascular: 2+ radial, DP/PT and femoral pulses B/L  Dermatologic: normal color and turgor; no visible rashes    MEDICATIONS:  MEDICATIONS  (STANDING):  cefTRIAXone   IVPB 2000 milliGRAM(s) IV Intermittent every 24 hours  chlorhexidine 0.12% Liquid 15 milliLiter(s) Oral Mucosa every 12 hours  chlorhexidine 2% Cloths 1 Application(s) Topical <User Schedule>  dextrose 5%. 1000 milliLiter(s) (150 mL/Hr) IV Continuous <Continuous>  enoxaparin Injectable 40 milliGRAM(s) SubCutaneous every 24 hours  norepinephrine Infusion 0.05 MICROgram(s)/kG/Min (8.88 mL/Hr) IV Continuous <Continuous>  pantoprazole  Injectable 40 milliGRAM(s) IV Push daily  rifAXIMin 550 milliGRAM(s) Oral two times a day  vancomycin  IVPB 1750 milliGRAM(s) IV Intermittent every 12 hours    MEDICATIONS  (PRN):  fentaNYL    Injectable 50 MICROGram(s) IV Push every 2 hours PRN severe pain/vent dyssynchrony      LABS:                        9.9    12.44 )-----------( 206      ( 22 May 2024 05:30 )             32.7     05-22    159<H>  |  128<H>  |  12  ----------------------------<  208<H>  3.6   |  23  |  0.73    Ca    7.6<L>      22 May 2024 05:30  Phos  1.8     05-22  Mg     2.4     05-22    TPro  4.8<L>  /  Alb  2.4<L>  /  TBili  0.2  /  DBili  x   /  AST  331<H>  /  ALT  200<H>  /  AlkPhos  61  05-22    PT/INR - ( 21 May 2024 16:08 )   PT: 11.5 sec;   INR: 1.01          PTT - ( 21 May 2024 16:08 )  PTT:29.3 sec  Urinalysis Basic - ( 22 May 2024 05:30 )    Color: x / Appearance: x / SG: x / pH: x  Gluc: 208 mg/dL / Ketone: x  / Bili: x / Urobili: x   Blood: x / Protein: x / Nitrite: x   Leuk Esterase: x / RBC: x / WBC x   Sq Epi: x / Non Sq Epi: x / Bacteria: x    RADIOLOGY & ADDITIONAL TESTS: Reviewed.

## 2024-05-22 NOTE — PROGRESS NOTE ADULT - PROBLEM SELECTOR PLAN 1
.  -Fentanyl 50mcg IV q2h PRN for Severe Pain or Vent Dyssynchrony
.  -Fentanyl 50mcg IV q2h PRN for Severe Pain or Vent Dyssynchrony

## 2024-05-22 NOTE — DISCHARGE NOTE FOR THE EXPIRED PATIENT - HOSPITAL COURSE
44F PMH of bipolar, HTN, HLD, RA, GERD, hemorrhoids, MARCELL, lupus and fibromyalgia found to be in cardiac arrest possibly 2/2 drug overdose (cocaine w/ possibly laced with fentanyl) admitted to the MICU for post-resuscitation care. Trops wnl, EKG nonischemic. CT head was noted w/ anoxic brain injury. Neurology and neurosurgery were consulted. Patient not surgical candidate. Concern for cerebral edema, patient received hypertonic saline to reach sodium goal. 5/21, sodium above 160, started on d5. Nephrology consulted, given ddavp. Patient maintained on normothermia. In comatose state, brainstem w/ areflexia, and apnea test positive on 5/22; criteria met for brain death. Family informed.  44F PMH of bipolar, HTN, HLD, RA, GERD, hemorrhoids, MARCELL, lupus and fibromyalgia found to be in cardiac arrest possibly 2/2 drug overdose (cocaine w/ possibly laced with fentanyl) admitted to the MICU for post-resuscitation care. Trops wnl, EKG nonischemic. CT head was noted w/ anoxic brain injury. Neurology and neurosurgery were consulted. Patient not surgical candidate. Concern for cerebral edema, patient received hypertonic saline to reach sodium goal. 5/21, sodium above 160, started on d5. Nephrology consulted, given ddavp. Patient maintained on normothermia. In comatose state, brainstem w/ areflexia, and apnea test positive on 5/22 performed at 3:30PM; criteria met for brain death. Family informed.

## 2024-05-22 NOTE — PROGRESS NOTE ADULT - PROBLEM SELECTOR PROBLEM 3
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

## 2024-05-22 NOTE — EEG REPORT - NS EEG TEXT BOX
[facility] Department of Neurology  Inpatient Continuous video-Electroencephalography Report    Acquisition Details:  Electroencephalography was acquired using a minimum of 21 channels on an [employer] Neurology system v 9.3.1 with electrode placement according to the standard International 10-20 system following ACNS (American Clinical Neurophysiology Society) guidelines for Long-Term Video EEG monitoring.  Anterior temporal T1 and T2 electrodes were utilized whenever possible.   The [employer] automated spike & seizure detections were all reviewed in detail, in addition to extensive portions of raw EEG.      Daily Updates (from 07:00 am until 07:00 am):  Day 3  May 21-22:   Background:  suppressed (completely <10 uV)  Voltage:   Suppressed (all <10 uV)  Organization:   Absent  Symmetry/Focality: Continuous (90+%)   possible right and left posterior temporal 0-5mV fast alpha/beta activity of cerebral origin, versus focal muscle activity.  Posterior Dominant Rhythm:  No clear PDR     Sleep:  Absent.  Variability:   No		Reactivity:  Unknown (stimulation periods not identified)    Spontaneous Activity:  No epileptiform discharges   Events:  •	No electrographic seizures or significant clinical events occurred during this study.  Provocations:  •	Hyperventilation: was not performed.  •	Photic stimulation: was not performed.  Daily Summary:    •	There was suppression of cerebral activity, with only a possibility of cerebral activity seen over the right more than left temporal region, though could also be muscle artifact.     •	The invariant background is an indicator of severe cerebral dysfunction.  The attenuation suggests cortical dysfunction or alternatively extra-cerebral absorption of electrical activity such as fluid collections.  •	Heavily sedating medications can contribute to lack of variability and diffuse attenuation.    •	There were no findings of active epilepsy, however this alone does not rule out the diagnosis.         Lamin Corrales MD  Attending Neurologist, [facility]

## 2024-05-22 NOTE — PROGRESS NOTE ADULT - PROBLEM SELECTOR PLAN 4
.  In the setting of likely overdose  -unknown true down time
.  In the setting of likely overdose  -unknown true down time  -pending brain death exam

## 2024-05-22 NOTE — PROCEDURE NOTE - NSPROCDETAILS_GEN_ALL_CORE
location identified, draped/prepped, sterile technique used, needle inserted/introduced/positive blood return obtained via catheter/connected to a pressurized flush line/sutured in place/hemostasis with direct pressure, dressing applied
guidewire recovered/lumen(s) aspirated and flushed/sterile dressing applied/sterile technique, catheter placed/ultrasound guidance with use of sterile gel and probe cove
location identified, draped/prepped, sterile technique used/blood seen on insertion/dressing applied/flushes easily/secured in place

## 2024-05-23 ENCOUNTER — RESULT REVIEW (OUTPATIENT)
Age: 44
End: 2024-05-23

## 2024-05-23 DIAGNOSIS — R06.00 DYSPNEA, UNSPECIFIED: ICD-10-CM

## 2024-05-23 DIAGNOSIS — R53.2 FUNCTIONAL QUADRIPLEGIA: ICD-10-CM

## 2024-05-23 DIAGNOSIS — Z51.5 ENCOUNTER FOR PALLIATIVE CARE: ICD-10-CM

## 2024-05-23 DIAGNOSIS — G93.49 OTHER ENCEPHALOPATHY: ICD-10-CM

## 2024-05-23 DIAGNOSIS — Z71.89 OTHER SPECIFIED COUNSELING: ICD-10-CM

## 2024-05-23 DIAGNOSIS — I46.9 CARDIAC ARREST, CAUSE UNSPECIFIED: ICD-10-CM

## 2024-05-23 LAB
ADD ON TEST-SPECIMEN IN LAB: SIGNIFICANT CHANGE UP
ADD ON TEST-SPECIMEN IN LAB: SIGNIFICANT CHANGE UP
ALBUMIN SERPL ELPH-MCNC: 2.8 G/DL — LOW (ref 3.3–5)
ALBUMIN SERPL ELPH-MCNC: 3.1 G/DL — LOW (ref 3.3–5)
ALP SERPL-CCNC: 63 U/L — SIGNIFICANT CHANGE UP (ref 40–120)
ALP SERPL-CCNC: 76 U/L — SIGNIFICANT CHANGE UP (ref 40–120)
ALP SERPL-CCNC: 80 U/L — SIGNIFICANT CHANGE UP (ref 40–120)
ALP SERPL-CCNC: 83 U/L — SIGNIFICANT CHANGE UP (ref 40–120)
ALT FLD-CCNC: 178 U/L — HIGH (ref 10–45)
ALT FLD-CCNC: 180 U/L — HIGH (ref 10–45)
ALT FLD-CCNC: 188 U/L — HIGH (ref 10–45)
ALT FLD-CCNC: 197 U/L — HIGH (ref 10–45)
AMYLASE P1 CFR SERPL: 74 U/L — SIGNIFICANT CHANGE UP (ref 25–125)
AMYLASE P1 CFR SERPL: 76 U/L — SIGNIFICANT CHANGE UP (ref 25–125)
AMYLASE P1 CFR SERPL: 88 U/L — SIGNIFICANT CHANGE UP (ref 25–125)
ANION GAP SERPL CALC-SCNC: 10 MMOL/L — SIGNIFICANT CHANGE UP (ref 5–17)
ANION GAP SERPL CALC-SCNC: 13 MMOL/L — SIGNIFICANT CHANGE UP (ref 5–17)
ANION GAP SERPL CALC-SCNC: 8 MMOL/L — SIGNIFICANT CHANGE UP (ref 5–17)
ANION GAP SERPL CALC-SCNC: 9 MMOL/L — SIGNIFICANT CHANGE UP (ref 5–17)
ANISOCYTOSIS BLD QL: SLIGHT — SIGNIFICANT CHANGE UP
APPEARANCE UR: CLEAR — SIGNIFICANT CHANGE UP
APTT BLD: 25.8 SEC — SIGNIFICANT CHANGE UP (ref 24.5–35.6)
APTT BLD: 29.5 SEC — SIGNIFICANT CHANGE UP (ref 24.5–35.6)
APTT BLD: 29.7 SEC — SIGNIFICANT CHANGE UP (ref 24.5–35.6)
AST SERPL-CCNC: 160 U/L — HIGH (ref 10–40)
AST SERPL-CCNC: 180 U/L — HIGH (ref 10–40)
AST SERPL-CCNC: 240 U/L — HIGH (ref 10–40)
AST SERPL-CCNC: 251 U/L — HIGH (ref 10–40)
BACTERIA # UR AUTO: NEGATIVE /HPF — SIGNIFICANT CHANGE UP
BASE EXCESS BLDA CALC-SCNC: -1.8 MMOL/L — SIGNIFICANT CHANGE UP (ref -2–3)
BASE EXCESS BLDA CALC-SCNC: -1.8 MMOL/L — SIGNIFICANT CHANGE UP (ref -2–3)
BASE EXCESS BLDA CALC-SCNC: 0.5 MMOL/L — SIGNIFICANT CHANGE UP (ref -2–3)
BASE EXCESS BLDV CALC-SCNC: 0 MMOL/L — SIGNIFICANT CHANGE UP (ref -2–3)
BASOPHILS # BLD AUTO: 0 K/UL — SIGNIFICANT CHANGE UP (ref 0–0.2)
BASOPHILS # BLD AUTO: 0.04 K/UL — SIGNIFICANT CHANGE UP (ref 0–0.2)
BASOPHILS NFR BLD AUTO: 0 % — SIGNIFICANT CHANGE UP (ref 0–2)
BASOPHILS NFR BLD AUTO: 0.3 % — SIGNIFICANT CHANGE UP (ref 0–2)
BILIRUB DIRECT SERPL-MCNC: <0.2 MG/DL — SIGNIFICANT CHANGE UP (ref 0–0.3)
BILIRUB DIRECT SERPL-MCNC: <0.2 MG/DL — SIGNIFICANT CHANGE UP (ref 0–0.3)
BILIRUB INDIRECT FLD-MCNC: SIGNIFICANT CHANGE UP (ref 0.2–1)
BILIRUB INDIRECT FLD-MCNC: SIGNIFICANT CHANGE UP (ref 0.2–1)
BILIRUB SERPL-MCNC: 0.2 MG/DL — SIGNIFICANT CHANGE UP (ref 0.2–1.2)
BILIRUB SERPL-MCNC: 0.3 MG/DL — SIGNIFICANT CHANGE UP (ref 0.2–1.2)
BILIRUB UR-MCNC: NEGATIVE — SIGNIFICANT CHANGE UP
BUN SERPL-MCNC: 11 MG/DL — SIGNIFICANT CHANGE UP (ref 7–23)
BUN SERPL-MCNC: 12 MG/DL — SIGNIFICANT CHANGE UP (ref 7–23)
BURR CELLS BLD QL SMEAR: PRESENT — SIGNIFICANT CHANGE UP
CA-I SERPL-SCNC: 1.1 MMOL/L — LOW (ref 1.15–1.33)
CALCIUM SERPL-MCNC: 7.5 MG/DL — LOW (ref 8.4–10.5)
CALCIUM SERPL-MCNC: 8.1 MG/DL — LOW (ref 8.4–10.5)
CALCIUM SERPL-MCNC: 8.2 MG/DL — LOW (ref 8.4–10.5)
CALCIUM SERPL-MCNC: 8.2 MG/DL — LOW (ref 8.4–10.5)
CAST: 6 /LPF — HIGH (ref 0–4)
CHLORIDE SERPL-SCNC: 108 MMOL/L — SIGNIFICANT CHANGE UP (ref 96–108)
CHLORIDE SERPL-SCNC: 111 MMOL/L — HIGH (ref 96–108)
CHLORIDE SERPL-SCNC: 115 MMOL/L — HIGH (ref 96–108)
CHLORIDE SERPL-SCNC: 116 MMOL/L — HIGH (ref 96–108)
CK MB CFR SERPL CALC: 17.7 NG/ML — HIGH (ref 0–6.7)
CK MB CFR SERPL CALC: 21 NG/ML — HIGH (ref 0–6.7)
CK MB CFR SERPL CALC: 27 NG/ML — HIGH (ref 0–6.7)
CK SERPL-CCNC: 3342 U/L — HIGH (ref 25–170)
CK SERPL-CCNC: 4681 U/L — HIGH (ref 25–170)
CK SERPL-CCNC: 6722 U/L — HIGH (ref 25–170)
CO2 BLDA-SCNC: 25 MMOL/L — HIGH (ref 19–24)
CO2 BLDA-SCNC: 25 MMOL/L — HIGH (ref 19–24)
CO2 BLDA-SCNC: 27 MMOL/L — HIGH (ref 19–24)
CO2 BLDV-SCNC: 28.9 MMOL/L — HIGH (ref 22–26)
CO2 SERPL-SCNC: 22 MMOL/L — SIGNIFICANT CHANGE UP (ref 22–31)
CO2 SERPL-SCNC: 22 MMOL/L — SIGNIFICANT CHANGE UP (ref 22–31)
CO2 SERPL-SCNC: 23 MMOL/L — SIGNIFICANT CHANGE UP (ref 22–31)
CO2 SERPL-SCNC: 23 MMOL/L — SIGNIFICANT CHANGE UP (ref 22–31)
COHGB MFR BLDV: 0.3 % — SIGNIFICANT CHANGE UP
COHGB MFR BLDV: 0.5 % — SIGNIFICANT CHANGE UP
COLOR SPEC: YELLOW — SIGNIFICANT CHANGE UP
CREAT SERPL-MCNC: 0.65 MG/DL — SIGNIFICANT CHANGE UP (ref 0.5–1.3)
CREAT SERPL-MCNC: 0.67 MG/DL — SIGNIFICANT CHANGE UP (ref 0.5–1.3)
CREAT SERPL-MCNC: 0.68 MG/DL — SIGNIFICANT CHANGE UP (ref 0.5–1.3)
CREAT SERPL-MCNC: 0.73 MG/DL — SIGNIFICANT CHANGE UP (ref 0.5–1.3)
DIFF PNL FLD: ABNORMAL
EGFR: 103 ML/MIN/1.73M2 — SIGNIFICANT CHANGE UP
EGFR: 109 ML/MIN/1.73M2 — SIGNIFICANT CHANGE UP
EGFR: 109 ML/MIN/1.73M2 — SIGNIFICANT CHANGE UP
EGFR: 110 ML/MIN/1.73M2 — SIGNIFICANT CHANGE UP
EOSINOPHIL # BLD AUTO: 0 K/UL — SIGNIFICANT CHANGE UP (ref 0–0.5)
EOSINOPHIL # BLD AUTO: 0.04 K/UL — SIGNIFICANT CHANGE UP (ref 0–0.5)
EOSINOPHIL NFR BLD AUTO: 0 % — SIGNIFICANT CHANGE UP (ref 0–6)
EOSINOPHIL NFR BLD AUTO: 0.3 % — SIGNIFICANT CHANGE UP (ref 0–6)
FIBRINOGEN PPP-MCNC: 726 MG/DL — HIGH (ref 200–445)
FIBRINOGEN PPP-MCNC: 835 MG/DL — HIGH (ref 200–445)
FIBRINOGEN PPP-MCNC: 835 MG/DL — HIGH (ref 200–445)
GAS PNL BLDA: SIGNIFICANT CHANGE UP
GAS PNL BLDA: SIGNIFICANT CHANGE UP
GAS PNL BLDV: 140 MMOL/L — SIGNIFICANT CHANGE UP (ref 136–145)
GLUCOSE BLDC GLUCOMTR-MCNC: 201 MG/DL — HIGH (ref 70–99)
GLUCOSE BLDC GLUCOMTR-MCNC: 222 MG/DL — HIGH (ref 70–99)
GLUCOSE BLDC GLUCOMTR-MCNC: 223 MG/DL — HIGH (ref 70–99)
GLUCOSE BLDV-MCNC: 231 MG/DL — HIGH (ref 70–99)
GLUCOSE SERPL-MCNC: 177 MG/DL — HIGH (ref 70–99)
GLUCOSE SERPL-MCNC: 215 MG/DL — HIGH (ref 70–99)
GLUCOSE SERPL-MCNC: 227 MG/DL — HIGH (ref 70–99)
GLUCOSE SERPL-MCNC: 233 MG/DL — HIGH (ref 70–99)
GLUCOSE UR QL: 500 MG/DL
HCO3 BLDA-SCNC: 24 MMOL/L — SIGNIFICANT CHANGE UP (ref 21–28)
HCO3 BLDA-SCNC: 24 MMOL/L — SIGNIFICANT CHANGE UP (ref 21–28)
HCO3 BLDA-SCNC: 25 MMOL/L — SIGNIFICANT CHANGE UP (ref 21–28)
HCO3 BLDV-SCNC: 27 MMOL/L — SIGNIFICANT CHANGE UP (ref 22–29)
HCT VFR BLD CALC: 30 % — LOW (ref 34.5–45)
HCT VFR BLD CALC: 31.4 % — LOW (ref 34.5–45)
HCT VFR BLD CALC: 31.7 % — LOW (ref 34.5–45)
HCT VFR BLD CALC: 34.3 % — LOW (ref 34.5–45)
HGB BLD CALC-MCNC: 10.1 G/DL — LOW (ref 11.7–16.1)
HGB BLD CALC-MCNC: 10.2 G/DL — LOW (ref 11.7–16.1)
HGB BLD-MCNC: 10.5 G/DL — LOW (ref 11.5–15.5)
HGB BLD-MCNC: 9.2 G/DL — LOW (ref 11.5–15.5)
HGB BLD-MCNC: 9.7 G/DL — LOW (ref 11.5–15.5)
HGB BLD-MCNC: 9.9 G/DL — LOW (ref 11.5–15.5)
HYPOCHROMIA BLD QL: SIGNIFICANT CHANGE UP
HYPOCHROMIA BLD QL: SLIGHT — SIGNIFICANT CHANGE UP
HYPOCHROMIA BLD QL: SLIGHT — SIGNIFICANT CHANGE UP
IMM GRANULOCYTES NFR BLD AUTO: 0.5 % — SIGNIFICANT CHANGE UP (ref 0–0.9)
INR BLD: 0.95 — SIGNIFICANT CHANGE UP (ref 0.85–1.18)
INR BLD: 0.99 — SIGNIFICANT CHANGE UP (ref 0.85–1.18)
INR BLD: 1.03 — SIGNIFICANT CHANGE UP (ref 0.85–1.18)
KETONES UR-MCNC: NEGATIVE MG/DL — SIGNIFICANT CHANGE UP
LDH SERPL L TO P-CCNC: 728 U/L — HIGH (ref 50–242)
LDH SERPL L TO P-CCNC: 730 U/L — HIGH (ref 50–242)
LDH SERPL L TO P-CCNC: 739 U/L — HIGH (ref 50–242)
LEUKOCYTE ESTERASE UR-ACNC: NEGATIVE — SIGNIFICANT CHANGE UP
LIDOCAIN IGE QN: 30 U/L — SIGNIFICANT CHANGE UP (ref 7–60)
LIDOCAIN IGE QN: 39 U/L — SIGNIFICANT CHANGE UP (ref 7–60)
LIDOCAIN IGE QN: 45 U/L — SIGNIFICANT CHANGE UP (ref 7–60)
LYMPHOCYTES # BLD AUTO: 0.54 K/UL — LOW (ref 1–3.3)
LYMPHOCYTES # BLD AUTO: 0.55 K/UL — LOW (ref 1–3.3)
LYMPHOCYTES # BLD AUTO: 0.63 K/UL — LOW (ref 1–3.3)
LYMPHOCYTES # BLD AUTO: 1.1 K/UL — SIGNIFICANT CHANGE UP (ref 1–3.3)
LYMPHOCYTES # BLD AUTO: 3.4 % — LOW (ref 13–44)
LYMPHOCYTES # BLD AUTO: 3.5 % — LOW (ref 13–44)
LYMPHOCYTES # BLD AUTO: 4.3 % — LOW (ref 13–44)
LYMPHOCYTES # BLD AUTO: 8.4 % — LOW (ref 13–44)
MACROCYTES BLD QL: SLIGHT — SIGNIFICANT CHANGE UP
MAGNESIUM SERPL-MCNC: 1.8 MG/DL — SIGNIFICANT CHANGE UP (ref 1.6–2.6)
MAGNESIUM SERPL-MCNC: 2 MG/DL — SIGNIFICANT CHANGE UP (ref 1.6–2.6)
MAGNESIUM SERPL-MCNC: 2.1 MG/DL — SIGNIFICANT CHANGE UP (ref 1.6–2.6)
MAGNESIUM SERPL-MCNC: 2.1 MG/DL — SIGNIFICANT CHANGE UP (ref 1.6–2.6)
MANUAL SMEAR VERIFICATION: SIGNIFICANT CHANGE UP
MCHC RBC-ENTMCNC: 26.1 PG — LOW (ref 27–34)
MCHC RBC-ENTMCNC: 26.4 PG — LOW (ref 27–34)
MCHC RBC-ENTMCNC: 26.4 PG — LOW (ref 27–34)
MCHC RBC-ENTMCNC: 26.7 PG — LOW (ref 27–34)
MCHC RBC-ENTMCNC: 30.6 GM/DL — LOW (ref 32–36)
MCHC RBC-ENTMCNC: 30.7 GM/DL — LOW (ref 32–36)
MCHC RBC-ENTMCNC: 30.9 GM/DL — LOW (ref 32–36)
MCHC RBC-ENTMCNC: 31.2 GM/DL — LOW (ref 32–36)
MCV RBC AUTO: 84.4 FL — SIGNIFICANT CHANGE UP (ref 80–100)
MCV RBC AUTO: 85.4 FL — SIGNIFICANT CHANGE UP (ref 80–100)
MCV RBC AUTO: 86.2 FL — SIGNIFICANT CHANGE UP (ref 80–100)
MCV RBC AUTO: 86.2 FL — SIGNIFICANT CHANGE UP (ref 80–100)
METHGB MFR BLDV: 0 % — SIGNIFICANT CHANGE UP
METHGB MFR BLDV: 0.1 % — SIGNIFICANT CHANGE UP
MICROCYTES BLD QL: SLIGHT — SIGNIFICANT CHANGE UP
MONOCYTES # BLD AUTO: 0.13 K/UL — SIGNIFICANT CHANGE UP (ref 0–0.9)
MONOCYTES # BLD AUTO: 0.26 K/UL — SIGNIFICANT CHANGE UP (ref 0–0.9)
MONOCYTES # BLD AUTO: 0.28 K/UL — SIGNIFICANT CHANGE UP (ref 0–0.9)
MONOCYTES # BLD AUTO: 1.09 K/UL — HIGH (ref 0–0.9)
MONOCYTES NFR BLD AUTO: 0.9 % — LOW (ref 2–14)
MONOCYTES NFR BLD AUTO: 1.7 % — LOW (ref 2–14)
MONOCYTES NFR BLD AUTO: 1.7 % — LOW (ref 2–14)
MONOCYTES NFR BLD AUTO: 8.3 % — SIGNIFICANT CHANGE UP (ref 2–14)
MYELOCYTES NFR BLD: 0.9 % — HIGH (ref 0–0)
NEUTROPHILS # BLD AUTO: 10.74 K/UL — HIGH (ref 1.8–7.4)
NEUTROPHILS # BLD AUTO: 13.94 K/UL — HIGH (ref 1.8–7.4)
NEUTROPHILS # BLD AUTO: 14.45 K/UL — HIGH (ref 1.8–7.4)
NEUTROPHILS # BLD AUTO: 15.41 K/UL — HIGH (ref 1.8–7.4)
NEUTROPHILS NFR BLD AUTO: 82.2 % — HIGH (ref 43–77)
NEUTROPHILS NFR BLD AUTO: 93.9 % — HIGH (ref 43–77)
NEUTROPHILS NFR BLD AUTO: 93.9 % — HIGH (ref 43–77)
NEUTROPHILS NFR BLD AUTO: 94 % — HIGH (ref 43–77)
NEUTS BAND # BLD: 0.9 % — SIGNIFICANT CHANGE UP (ref 0–8)
NEUTS BAND # BLD: 0.9 % — SIGNIFICANT CHANGE UP (ref 0–8)
NITRITE UR-MCNC: NEGATIVE — SIGNIFICANT CHANGE UP
NRBC # BLD: 0 /100 WBCS — SIGNIFICANT CHANGE UP (ref 0–0)
NRBC # BLD: 1 /100 WBCS — HIGH (ref 0–0)
NRBC # BLD: SIGNIFICANT CHANGE UP /100 WBCS (ref 0–0)
OVALOCYTES BLD QL SMEAR: SLIGHT — SIGNIFICANT CHANGE UP
OVALOCYTES BLD QL SMEAR: SLIGHT — SIGNIFICANT CHANGE UP
PCO2 BLDA: 41 MMHG — SIGNIFICANT CHANGE UP (ref 32–45)
PCO2 BLDA: 42 MMHG — SIGNIFICANT CHANGE UP (ref 32–45)
PCO2 BLDA: 42 MMHG — SIGNIFICANT CHANGE UP (ref 32–45)
PCO2 BLDV: 54 MMHG — HIGH (ref 39–52)
PH BLDA: 7.36 — SIGNIFICANT CHANGE UP (ref 7.35–7.45)
PH BLDA: 7.36 — SIGNIFICANT CHANGE UP (ref 7.35–7.45)
PH BLDA: 7.4 — SIGNIFICANT CHANGE UP (ref 7.35–7.45)
PH BLDV: 7.31 — LOW (ref 7.32–7.43)
PH UR: 6.5 — SIGNIFICANT CHANGE UP (ref 5–8)
PHOSPHATE SERPL-MCNC: 2 MG/DL — LOW (ref 2.5–4.5)
PHOSPHATE SERPL-MCNC: 2 MG/DL — LOW (ref 2.5–4.5)
PHOSPHATE SERPL-MCNC: 2.1 MG/DL — LOW (ref 2.5–4.5)
PHOSPHATE SERPL-MCNC: 2.3 MG/DL — LOW (ref 2.5–4.5)
PLAT MORPH BLD: ABNORMAL
PLAT MORPH BLD: NORMAL — SIGNIFICANT CHANGE UP
PLAT MORPH BLD: NORMAL — SIGNIFICANT CHANGE UP
PLATELET # BLD AUTO: 205 K/UL — SIGNIFICANT CHANGE UP (ref 150–400)
PLATELET # BLD AUTO: 210 K/UL — SIGNIFICANT CHANGE UP (ref 150–400)
PLATELET # BLD AUTO: 210 K/UL — SIGNIFICANT CHANGE UP (ref 150–400)
PLATELET # BLD AUTO: 220 K/UL — SIGNIFICANT CHANGE UP (ref 150–400)
PLATELET CLUMP BLD QL SMEAR: SLIGHT
PO2 BLDA: 214 MMHG — HIGH (ref 83–108)
PO2 BLDA: 305 MMHG — HIGH (ref 83–108)
PO2 BLDA: 79 MMHG — LOW (ref 83–108)
PO2 BLDV: 58 MMHG — HIGH (ref 25–45)
POIKILOCYTOSIS BLD QL AUTO: SLIGHT — SIGNIFICANT CHANGE UP
POIKILOCYTOSIS BLD QL AUTO: SLIGHT — SIGNIFICANT CHANGE UP
POLYCHROMASIA BLD QL SMEAR: SLIGHT — SIGNIFICANT CHANGE UP
POTASSIUM BLDV-SCNC: 3.5 MMOL/L — SIGNIFICANT CHANGE UP (ref 3.5–5.1)
POTASSIUM SERPL-MCNC: 3.5 MMOL/L — SIGNIFICANT CHANGE UP (ref 3.5–5.3)
POTASSIUM SERPL-MCNC: 3.7 MMOL/L — SIGNIFICANT CHANGE UP (ref 3.5–5.3)
POTASSIUM SERPL-MCNC: 3.9 MMOL/L — SIGNIFICANT CHANGE UP (ref 3.5–5.3)
POTASSIUM SERPL-MCNC: 4.4 MMOL/L — SIGNIFICANT CHANGE UP (ref 3.5–5.3)
POTASSIUM SERPL-SCNC: 3.5 MMOL/L — SIGNIFICANT CHANGE UP (ref 3.5–5.3)
POTASSIUM SERPL-SCNC: 3.7 MMOL/L — SIGNIFICANT CHANGE UP (ref 3.5–5.3)
POTASSIUM SERPL-SCNC: 3.9 MMOL/L — SIGNIFICANT CHANGE UP (ref 3.5–5.3)
POTASSIUM SERPL-SCNC: 4.4 MMOL/L — SIGNIFICANT CHANGE UP (ref 3.5–5.3)
PROT SERPL-MCNC: 5.2 G/DL — LOW (ref 6–8.3)
PROT SERPL-MCNC: 6 G/DL — SIGNIFICANT CHANGE UP (ref 6–8.3)
PROT SERPL-MCNC: 6.2 G/DL — SIGNIFICANT CHANGE UP (ref 6–8.3)
PROT SERPL-MCNC: 6.4 G/DL — SIGNIFICANT CHANGE UP (ref 6–8.3)
PROT UR-MCNC: SIGNIFICANT CHANGE UP MG/DL
PROTHROM AB SERPL-ACNC: 10.9 SEC — SIGNIFICANT CHANGE UP (ref 9.5–13)
PROTHROM AB SERPL-ACNC: 11.3 SEC — SIGNIFICANT CHANGE UP (ref 9.5–13)
PROTHROM AB SERPL-ACNC: 11.7 SEC — SIGNIFICANT CHANGE UP (ref 9.5–13)
RBC # BLD: 3.48 M/UL — LOW (ref 3.8–5.2)
RBC # BLD: 3.71 M/UL — LOW (ref 3.8–5.2)
RBC # BLD: 3.72 M/UL — LOW (ref 3.8–5.2)
RBC # BLD: 3.98 M/UL — SIGNIFICANT CHANGE UP (ref 3.8–5.2)
RBC # FLD: 15.2 % — HIGH (ref 10.3–14.5)
RBC # FLD: 15.4 % — HIGH (ref 10.3–14.5)
RBC # FLD: 15.4 % — HIGH (ref 10.3–14.5)
RBC # FLD: 15.5 % — HIGH (ref 10.3–14.5)
RBC BLD AUTO: ABNORMAL
RBC CASTS # UR COMP ASSIST: 0 /HPF — SIGNIFICANT CHANGE UP (ref 0–4)
SAO2 % BLDA: 96.3 % — SIGNIFICANT CHANGE UP (ref 94–98)
SAO2 % BLDA: 97.8 % — SIGNIFICANT CHANGE UP (ref 94–98)
SAO2 % BLDA: 98.2 % — HIGH (ref 94–98)
SAO2 % BLDV: 89 % — HIGH (ref 67–88)
SAO2 % BLDV: 91 % — HIGH (ref 67–88)
SCHISTOCYTES BLD QL AUTO: SLIGHT — SIGNIFICANT CHANGE UP
SCHISTOCYTES BLD QL AUTO: SLIGHT — SIGNIFICANT CHANGE UP
SODIUM SERPL-SCNC: 143 MMOL/L — SIGNIFICANT CHANGE UP (ref 135–145)
SODIUM SERPL-SCNC: 143 MMOL/L — SIGNIFICANT CHANGE UP (ref 135–145)
SODIUM SERPL-SCNC: 147 MMOL/L — HIGH (ref 135–145)
SODIUM SERPL-SCNC: 147 MMOL/L — HIGH (ref 135–145)
SP GR SPEC: 1.01 — SIGNIFICANT CHANGE UP (ref 1–1.03)
SPHEROCYTES BLD QL SMEAR: SLIGHT — SIGNIFICANT CHANGE UP
SPHEROCYTES BLD QL SMEAR: SLIGHT — SIGNIFICANT CHANGE UP
SQUAMOUS # UR AUTO: 5 /HPF — SIGNIFICANT CHANGE UP (ref 0–5)
TROPONIN T, HIGH SENSITIVITY RESULT: <6 NG/L — SIGNIFICANT CHANGE UP (ref 0–51)
UROBILINOGEN FLD QL: 0.2 MG/DL — SIGNIFICANT CHANGE UP (ref 0.2–1)
VANCOMYCIN TROUGH SERPL-MCNC: 10 UG/ML — SIGNIFICANT CHANGE UP (ref 10–20)
WBC # BLD: 13.07 K/UL — HIGH (ref 3.8–10.5)
WBC # BLD: 14.7 K/UL — HIGH (ref 3.8–10.5)
WBC # BLD: 15.39 K/UL — HIGH (ref 3.8–10.5)
WBC # BLD: 16.24 K/UL — HIGH (ref 3.8–10.5)
WBC # FLD AUTO: 13.07 K/UL — HIGH (ref 3.8–10.5)
WBC # FLD AUTO: 14.7 K/UL — HIGH (ref 3.8–10.5)
WBC # FLD AUTO: 15.39 K/UL — HIGH (ref 3.8–10.5)
WBC # FLD AUTO: 16.24 K/UL — HIGH (ref 3.8–10.5)
WBC UR QL: 1 /HPF — SIGNIFICANT CHANGE UP (ref 0–5)

## 2024-05-23 PROCEDURE — 71250 CT THORAX DX C-: CPT | Mod: 26

## 2024-05-23 PROCEDURE — 76700 US EXAM ABDOM COMPLETE: CPT | Mod: 26

## 2024-05-23 PROCEDURE — 74176 CT ABD & PELVIS W/O CONTRAST: CPT | Mod: 26

## 2024-05-23 PROCEDURE — 99233 SBSQ HOSP IP/OBS HIGH 50: CPT | Mod: GC

## 2024-05-23 PROCEDURE — 99231 SBSQ HOSP IP/OBS SF/LOW 25: CPT | Mod: GC,25

## 2024-05-23 PROCEDURE — 93306 TTE W/DOPPLER COMPLETE: CPT | Mod: 26

## 2024-05-23 PROCEDURE — 93456 R HRT CORONARY ARTERY ANGIO: CPT | Mod: 26

## 2024-05-23 PROCEDURE — 31624 DX BRONCHOSCOPE/LAVAGE: CPT | Mod: GC

## 2024-05-23 PROCEDURE — 99152 MOD SED SAME PHYS/QHP 5/>YRS: CPT

## 2024-05-23 PROCEDURE — 71045 X-RAY EXAM CHEST 1 VIEW: CPT | Mod: 26

## 2024-05-23 RX ORDER — HYDRALAZINE HCL 50 MG
5 TABLET ORAL ONCE
Refills: 0 | Status: COMPLETED | OUTPATIENT
Start: 2024-05-23 | End: 2024-05-23

## 2024-05-23 RX ORDER — POTASSIUM CHLORIDE 20 MEQ
40 PACKET (EA) ORAL
Refills: 0 | Status: COMPLETED | OUTPATIENT
Start: 2024-05-23 | End: 2024-05-23

## 2024-05-23 RX ORDER — CHLORHEXIDINE GLUCONATE 213 G/1000ML
15 SOLUTION TOPICAL EVERY 12 HOURS
Refills: 0 | Status: DISCONTINUED | OUTPATIENT
Start: 2024-05-23 | End: 2024-05-26

## 2024-05-23 RX ORDER — FLUCONAZOLE 150 MG/1
400 TABLET ORAL EVERY 24 HOURS
Refills: 0 | Status: DISCONTINUED | OUTPATIENT
Start: 2024-05-23 | End: 2024-05-26

## 2024-05-23 RX ORDER — NICARDIPINE HYDROCHLORIDE 30 MG/1
3 CAPSULE, EXTENDED RELEASE ORAL
Qty: 40 | Refills: 0 | Status: DISCONTINUED | OUTPATIENT
Start: 2024-05-23 | End: 2024-05-26

## 2024-05-23 RX ORDER — FUROSEMIDE 40 MG
60 TABLET ORAL ONCE
Refills: 0 | Status: COMPLETED | OUTPATIENT
Start: 2024-05-23 | End: 2024-05-23

## 2024-05-23 RX ORDER — POTASSIUM CHLORIDE 20 MEQ
40 PACKET (EA) ORAL ONCE
Refills: 0 | Status: COMPLETED | OUTPATIENT
Start: 2024-05-23 | End: 2024-05-23

## 2024-05-23 RX ORDER — NICARDIPINE HYDROCHLORIDE 30 MG/1
5 CAPSULE, EXTENDED RELEASE ORAL
Qty: 40 | Refills: 0 | Status: DISCONTINUED | OUTPATIENT
Start: 2024-05-23 | End: 2024-05-23

## 2024-05-23 RX ADMIN — Medication 5 MILLIGRAM(S): at 06:24

## 2024-05-23 RX ADMIN — Medication 3 MILLILITER(S): at 10:33

## 2024-05-23 RX ADMIN — CHLORHEXIDINE GLUCONATE 1 APPLICATION(S): 213 SOLUTION TOPICAL at 05:17

## 2024-05-23 RX ADMIN — Medication 250 MILLIGRAM(S): at 05:16

## 2024-05-23 RX ADMIN — PIPERACILLIN AND TAZOBACTAM 25 GRAM(S): 4; .5 INJECTION, POWDER, LYOPHILIZED, FOR SOLUTION INTRAVENOUS at 05:17

## 2024-05-23 RX ADMIN — Medication 5 MILLIGRAM(S): at 03:35

## 2024-05-23 RX ADMIN — Medication 3 MILLILITER(S): at 06:06

## 2024-05-23 RX ADMIN — Medication 1 DROP(S): at 14:14

## 2024-05-23 RX ADMIN — Medication 40 MILLIEQUIVALENT(S): at 01:38

## 2024-05-23 RX ADMIN — CHLORHEXIDINE GLUCONATE 15 MILLILITER(S): 213 SOLUTION TOPICAL at 18:26

## 2024-05-23 RX ADMIN — PANTOPRAZOLE SODIUM 40 MILLIGRAM(S): 20 TABLET, DELAYED RELEASE ORAL at 12:52

## 2024-05-23 RX ADMIN — ENOXAPARIN SODIUM 40 MILLIGRAM(S): 100 INJECTION SUBCUTANEOUS at 10:07

## 2024-05-23 RX ADMIN — Medication 3 MILLILITER(S): at 22:09

## 2024-05-23 RX ADMIN — Medication 1 DROP(S): at 04:40

## 2024-05-23 RX ADMIN — PIPERACILLIN AND TAZOBACTAM 25 GRAM(S): 4; .5 INJECTION, POWDER, LYOPHILIZED, FOR SOLUTION INTRAVENOUS at 20:18

## 2024-05-23 RX ADMIN — Medication 1 DROP(S): at 22:16

## 2024-05-23 RX ADMIN — Medication 1 DROP(S): at 06:07

## 2024-05-23 RX ADMIN — Medication 40 MILLIEQUIVALENT(S): at 22:14

## 2024-05-23 RX ADMIN — Medication 3 MILLILITER(S): at 16:55

## 2024-05-23 RX ADMIN — Medication 62.5 MILLIMOLE(S): at 07:00

## 2024-05-23 RX ADMIN — Medication 4: at 06:16

## 2024-05-23 RX ADMIN — PIPERACILLIN AND TAZOBACTAM 25 GRAM(S): 4; .5 INJECTION, POWDER, LYOPHILIZED, FOR SOLUTION INTRAVENOUS at 12:53

## 2024-05-23 RX ADMIN — Medication 60 MILLIGRAM(S): at 12:53

## 2024-05-23 RX ADMIN — Medication 4: at 10:13

## 2024-05-23 RX ADMIN — Medication 1 DROP(S): at 12:53

## 2024-05-23 RX ADMIN — Medication 40 MILLIEQUIVALENT(S): at 20:19

## 2024-05-23 RX ADMIN — NICARDIPINE HYDROCHLORIDE 15 MG/HR: 30 CAPSULE, EXTENDED RELEASE ORAL at 09:26

## 2024-05-23 RX ADMIN — Medication 1 DROP(S): at 08:05

## 2024-05-23 RX ADMIN — Medication 1 DROP(S): at 02:16

## 2024-05-23 RX ADMIN — Medication 1 DROP(S): at 16:51

## 2024-05-23 RX ADMIN — NICARDIPINE HYDROCHLORIDE 15 MG/HR: 30 CAPSULE, EXTENDED RELEASE ORAL at 20:01

## 2024-05-23 RX ADMIN — FLUCONAZOLE 100 MILLIGRAM(S): 150 TABLET ORAL at 22:14

## 2024-05-23 RX ADMIN — Medication 1 DROP(S): at 00:04

## 2024-05-23 RX ADMIN — Medication 4: at 17:08

## 2024-05-23 RX ADMIN — Medication 1 DROP(S): at 20:01

## 2024-05-23 RX ADMIN — CHLORHEXIDINE GLUCONATE 15 MILLILITER(S): 213 SOLUTION TOPICAL at 05:16

## 2024-05-23 RX ADMIN — Medication 1 DROP(S): at 18:27

## 2024-05-23 RX ADMIN — Medication 1 DROP(S): at 10:07

## 2024-05-23 NOTE — PROCEDURE NOTE - NSBRONCHFINDINGS_GEN_A_CORE_FT
Mucosa appeared normal. Minimal secretions. No visualized foreign bodies or endobronchial lesions. No mucosal changes visualized. No signs of aspiration. No signs of atelectasis. No abnormal anatomical variants noted. Minimal secretions were clear and removed with suction without reaccumulation. RML BAL return was adequate.

## 2024-05-23 NOTE — PROCEDURE NOTE - NSBRONCHHISTORY_GEN_A_CORE_FT
43 yo female with brain death, undergoing evaluation for organ donation 45 yo female with brain death, undergoing evaluation for organ donation.

## 2024-05-23 NOTE — PROGRESS NOTE ADULT - SUBJECTIVE AND OBJECTIVE BOX
INTERVAL HPI/OVERNIGHT EVENTS: adjusted vent settings per Live[facility] protocol. [facility] requested Bronch with BAL, SBP 200s gave hydral 5 x2, gave 15 NaPhos    SUBJECTIVE: Patient seen and examined at bedside.    VITAL SIGNS:  ICU Vital Signs Last 24 Hrs  T(C): 35.6 (23 May 2024 09:08), Max: 36.4 (22 May 2024 17:43)  T(F): 96.1 (23 May 2024 09:08), Max: 97.5 (22 May 2024 17:43)  HR: 104 (23 May 2024 12:15) (84 - 115)  BP: 171/104 (23 May 2024 05:00) (171/104 - 171/104)  BP(mean): 131 (23 May 2024 05:00) (131 - 131)  ABP: 150/84 (23 May 2024 10:00) (118/68 - 205/110)  ABP(mean): 114 (23 May 2024 10:00) (91 - 154)  RR: 15 (23 May 2024 12:15) (15 - 20)  SpO2: 99% (23 May 2024 12:15) (96% - 100%)    O2 Parameters below as of 23 May 2024 12:15  Patient On (Oxygen Delivery Method): ventilator    O2 Concentration (%): 50    Mode: AC/ CMV (Assist Control/ Continuous Mandatory Ventilation), RR (machine): 15, FiO2: 50, PEEP: 10, ITime: 1, MAP: 16, PC: 24, PIP: 24    05-22 @ 07:01  -  05-23 @ 07:00  --------------------------------------------------------  IN: 6614.3 mL / OUT: 1768 mL / NET: 4846.3 mL    05-23 @ 07:01  -  05-23 @ 13:01  --------------------------------------------------------  IN: 878.6 mL / OUT: 420 mL / NET: 458.6 mL    CAPILLARY BLOOD GLUCOSE    POCT Blood Glucose.: 223 mg/dL (23 May 2024 10:09)    PHYSICAL EXAM:  Constitutional: intubated, comatose  HEENT: mmm  Neck: supple, no JVD  Cardiovascular: rrr  Respiratory: ctab  Gastrointestinal: abdomen tense, distended  Extremities: WWP, no edema  Vascular: 2+ radial, DP/PT and femoral pulses B/L  Dermatologic: no wounds no rashes    MEDICATIONS:  MEDICATIONS  (STANDING):  albuterol/ipratropium for Nebulization 3 milliLiter(s) Nebulizer every 6 hours  artificial  tears Solution 1 Drop(s) Both EYES every 2 hours  chlorhexidine 0.12% Liquid 15 milliLiter(s) Oral Mucosa every 12 hours  chlorhexidine 2% Cloths 1 Application(s) Topical <User Schedule>  dextrose 10% Bolus 125 milliLiter(s) IV Bolus once  dextrose 5%. 1000 milliLiter(s) (125 mL/Hr) IV Continuous <Continuous>  dextrose 5%. 1000 milliLiter(s) (100 mL/Hr) IV Continuous <Continuous>  dextrose 5%. 1000 milliLiter(s) (50 mL/Hr) IV Continuous <Continuous>  dextrose 50% Injectable 25 Gram(s) IV Push once  dextrose 50% Injectable 12.5 Gram(s) IV Push once  enoxaparin Injectable 40 milliGRAM(s) SubCutaneous every 24 hours  glucagon  Injectable 1 milliGRAM(s) IntraMuscular once  insulin lispro (ADMELOG) corrective regimen sliding scale   SubCutaneous three times a day before meals  levothyroxine Infusion 10 MICROgram(s)/Hr (25 mL/Hr) IV Continuous <Continuous>  niCARdipine Infusion 3 mG/Hr (15 mL/Hr) IV Continuous <Continuous>  pantoprazole  Injectable 40 milliGRAM(s) IV Push daily  piperacillin/tazobactam IVPB.. 3.375 Gram(s) IV Intermittent every 8 hours  rifAXIMin 550 milliGRAM(s) Oral two times a day  sodium phosphate 15 milliMole(s)/250 mL IVPB 15 milliMole(s) IV Intermittent once  vasopressin Infusion. 0.5 Unit(s)/Hr (1.25 mL/Hr) IV Continuous <Continuous>    MEDICATIONS  (PRN):  acetylcysteine 10%  Inhalation 4 milliLiter(s) Inhalation every 4 hours PRN THICK SECRETIONS  dextrose Oral Gel 15 Gram(s) Oral once PRN Blood Glucose LESS THAN 70 milliGRAM(s)/deciliter  fentaNYL    Injectable 50 MICROGram(s) IV Push every 2 hours PRN severe pain/vent dyssynchrony  sodium chloride 0.9% lock flush 10 milliLiter(s) IV Push every 1 hour PRN Pre/post blood products, medications, blood draw, and to maintain line patency      LABS:                        10.5   14.70 )-----------( 220      ( 23 May 2024 05:30 )             34.3     05-23    147<H>  |  115<H>  |  12  ----------------------------<  233<H>  4.4   |  23  |  0.68    Ca    8.2<L>      23 May 2024 05:30  Phos  2.1     05-23  Mg     2.1     05-23    TPro  6.4  /  Alb  3.1<L>  /  TBili  0.3  /  DBili  x   /  AST  240<H>  /  ALT  197<H>  /  AlkPhos  80  05-23    PT/INR - ( 23 May 2024 00:04 )   PT: 10.9 sec;   INR: 0.95       PTT - ( 23 May 2024 00:04 )  PTT:25.8 sec  Urinalysis Basic - ( 23 May 2024 05:30 )    Color: x / Appearance: x / SG: x / pH: x  Gluc: 233 mg/dL / Ketone: x  / Bili: x / Urobili: x   Blood: x / Protein: x / Nitrite: x   Leuk Esterase: x / RBC: x / WBC x   Sq Epi: x / Non Sq Epi: x / Bacteria: x    RADIOLOGY & ADDITIONAL TESTS: Reviewed.

## 2024-05-23 NOTE — CONSULT NOTE ADULT - SUBJECTIVE AND OBJECTIVE BOX
PULMONARY SERVICE INITIAL CONSULT NOTE    HPI:  Patient is a 45 yo F with PMHx of HTN, HLD, RA, chronic GERD, hemorrhoids, chronic MARCELL, anxiety/MDD, bipolar with paula, lupus, and fibromyalgia presenting to [facility] ED after cardiac arrest. Patient was found down, apneic and pulseless at [facility] with drug paraphernalia (crack pipe, needles) around her. Unclear how long patient was down for. Given intranasal narcan at the scene without any changes. 15 minutes of ACLS was performed in the field w/ ROSC. Received 2 of epi, and was intubate. She because hypotensive enroute and was given a third dose of epi. She arrived in the ED and pulse was lost and again one more round of compressions performed w/ ROSC. Patient was started on pressor support.     Of note, she was seen at [facility] 3 days ago  for RLQ pain. LMP 5/.   Spoke with daughter who lives in [place]. She is flying in from [place]     In the ED:  Initial vital signs: T: 97.3F, HR: 73, BP:76/40 R:  SpO2: 91% on BVM  Labs:  Hgb 10.6; pH 6.99; lactate 7.2; Na 132; HCO3 17; ; ; UTox   Imaging:   Interventions: 1L LR; Levophed; 50meq bicarb x2 and bicarb drip @150mL/hr   (19 May 2024 23:48)      REVIEW OF SYSTEMS:  Constitutional: No fever, weight loss or fatigue  Eyes: No eye pain, visual disturbances, or discharge  ENMT:  No difficulty hearing, tinnitus, vertigo; No sinus or throat pain  Neck: No pain, stiffness or neck swelling  Respiratory: see HPI  Cardiovascular: No chest pain, palpitations, dizziness or leg swelling  Gastrointestinal: No abdominal or epigastric pain. No nausea, vomiting or hematemesis; No diarrhea or constipation. No melena or hematochezia.  Genitourinary: No dysuria, frequency, hematuria or incontinence  Neurological: No headaches, memory loss, loss of strength, numbness or tremors  Skin: No itching, burning, rashes or lesions   Lymph Nodes: No enlarged glands  Endocrine: No heat or cold intolerance; No hair loss  Musculoskeletal: No joint pain or swelling; No muscle, back or extremity pain  Psychiatric: No depression, anxiety, mood swings or difficulty sleeping  Heme/Lymph: No easy bruising or bleeding gums  Allergy and Immunologic: No hives or eczema    PAST MEDICAL & SURGICAL HISTORY:      FAMILY HISTORY:      SOCIAL HISTORY:  Smoking Status: [ ] Current, [ ] Former, [ ] Never  Pack Years:    MEDICATIONS:  Pulmonary:  acetylcysteine 10%  Inhalation 4 milliLiter(s) Inhalation every 4 hours PRN  albuterol/ipratropium for Nebulization 3 milliLiter(s) Nebulizer every 6 hours    Antimicrobials:  piperacillin/tazobactam IVPB.. 3.375 Gram(s) IV Intermittent every 8 hours  rifAXIMin 550 milliGRAM(s) Oral two times a day    Anticoagulants:  enoxaparin Injectable 40 milliGRAM(s) SubCutaneous every 24 hours    Onc:    GI/:  pantoprazole  Injectable 40 milliGRAM(s) IV Push daily    Endocrine:  dextrose 50% Injectable 25 Gram(s) IV Push once  dextrose 50% Injectable 12.5 Gram(s) IV Push once  dextrose Oral Gel 15 Gram(s) Oral once PRN  glucagon  Injectable 1 milliGRAM(s) IntraMuscular once  insulin lispro (ADMELOG) corrective regimen sliding scale   SubCutaneous three times a day before meals  levothyroxine Infusion 10 MICROgram(s)/Hr IV Continuous <Continuous>  vasopressin Infusion. 0.5 Unit(s)/Hr IV Continuous <Continuous>    Cardiac:  niCARdipine Infusion 3 mG/Hr IV Continuous <Continuous>    Other Medications:  artificial  tears Solution 1 Drop(s) Both EYES every 2 hours  chlorhexidine 0.12% Liquid 15 milliLiter(s) Oral Mucosa every 12 hours  chlorhexidine 2% Cloths 1 Application(s) Topical <User Schedule>  dextrose 10% Bolus 125 milliLiter(s) IV Bolus once  dextrose 5%. 1000 milliLiter(s) IV Continuous <Continuous>  dextrose 5%. 1000 milliLiter(s) IV Continuous <Continuous>  dextrose 5%. 1000 milliLiter(s) IV Continuous <Continuous>  fentaNYL    Injectable 50 MICROGram(s) IV Push every 2 hours PRN  sodium chloride 0.9% lock flush 10 milliLiter(s) IV Push every 1 hour PRN      Allergies    No Known Allergies    Intolerances        Vital Signs Last 24 Hrs  T(C): 36.1 (23 May 2024 14:11), Max: 36.4 (22 May 2024 23:03)  T(F): 97 (23 May 2024 14:11), Max: 97.5 (22 May 2024 23:03)  HR: 95 (23 May 2024 17:00) (84 - 106)  BP: 171/104 (23 May 2024 05:00) (171/104 - 171/104)  BP(mean): 131 (23 May 2024 05:00) (131 - 131)  RR: 15 (23 May 2024 17:00) (15 - 15)  SpO2: 100% (23 May 2024 17:00) (93% - 100%)    Parameters below as of 23 May 2024 17:00  Patient On (Oxygen Delivery Method): ventilator    O2 Concentration (%): 55    05- @ 07:01  -   @ 07:00  --------------------------------------------------------  IN: 6614.3 mL / OUT: 1768 mL / NET: 4846.3 mL     @ 07:01  -   @ 19:06  --------------------------------------------------------  IN: 2638.2 mL / OUT: 3516 mL / NET: -877.8 mL      Mode: AC/ CMV (Assist Control/ Continuous Mandatory Ventilation)  RR (machine): 15  FiO2: 65  PEEP: 10  ITime: 0.1  MAP: 16  PC: 24  PIP: 24          LABS:  ABG - ( 23 May 2024 08:31 )  pH, Arterial: 7.36  pH, Blood: x     /  pCO2: 42    /  pO2: 79    / HCO3: 24    / Base Excess: -1.8  /  SaO2: 96.3                CBC Full  -  ( 23 May 2024 17:18 )  WBC Count : x  RBC Count : 3.72 M/uL  Hemoglobin : 9.7 g/dL  Hematocrit : 31.4 %  Platelet Count - Automated : 210 K/uL  Mean Cell Volume : 84.4 fl  Mean Cell Hemoglobin : 26.1 pg  Mean Cell Hemoglobin Concentration : 30.9 gm/dL  Auto Neutrophil # : x  Auto Lymphocyte # : x  Auto Monocyte # : x  Auto Eosinophil # : x  Auto Basophil # : x  Auto Neutrophil % : x  Auto Lymphocyte % : x  Auto Monocyte % : x  Auto Eosinophil % : x  Auto Basophil % : x        143  |  111<H>  |  12  ----------------------------<  227<H>  3.9   |  22  |  0.65    Ca    8.2<L>      23 May 2024 08:31  Phos  2.0       Mg     2.0         TPro  6.2  /  Alb  3.1<L>  /  TBili  0.2  /  DBili  <0.2  /  AST  180<H>  /  ALT  180<H>  /  AlkPhos  76      PT/INR - ( 23 May 2024 17:18 )   PT: 11.3 sec;   INR: 0.99          PTT - ( 23 May 2024 17:18 )  PTT:29.7 sec      Urinalysis Basic - ( 23 May 2024 13:39 )    Color: Yellow / Appearance: Clear / S.010 / pH: x  Gluc: x / Ketone: Negative mg/dL  / Bili: Negative / Urobili: 0.2 mg/dL   Blood: x / Protein: Trace mg/dL / Nitrite: Negative   Leuk Esterase: Negative / RBC: 0 /HPF / WBC 1 /HPF   Sq Epi: x / Non Sq Epi: 5 /HPF / Bacteria: Negative /HPF                RADIOLOGY & ADDITIONAL STUDIES:

## 2024-05-23 NOTE — CONSULT NOTE ADULT - CONSULT REASON
ICP management/anoxic brain injury
Post cardiac arrest neuroprognostication
post cardiac arrest
Cerebral Edema
Symptom Management and Complex Medical Decision Making/GOC in the setting of Critical Illness/Cardiac Arrest
Bronchoscopy

## 2024-05-23 NOTE — PROGRESS NOTE ADULT - ASSESSMENT
Assessment: 44 pmhx of bipolar (on lithium), htn (hctz), HLD, RA, GERD, hemorrhoids, MARCELL, lupus and fibromyalgia found to be in cardiac arrest possible 2/2 drug overdose (cocaine w/ possibly laced with fentanyl) admitted to the micu for post resuscitation care.    Neuro  #Brain death  5/22 performed 72 hours post cardiac resuscitation. Patient without confounding conditions  Coma confirmed without sign of arousal, or response to noxious stimulus (nailbed). Brainstem areflexia confirmed with absence of pupilar light reflex, vestibuloocular reflex, corneal reflex, gag reflex, cough reflex. Positive apnea testing performed w/ preoxygenation with 100% fio2, pCO2 greater that 60. No further ancilllary brain death testing.    #Anoxic brain injury 2/2 to cardiac arrest  UTox positive for cocaine.  CT Head - effacement of the cerebral sulci, ventricles and cisternal spaces secondary to cerebral edema. Findings consistent with anoxic brain injury. s/p 23% saline x1, and mannitol 100 x1  Na 163 5/21 ovn, started on d5 gtt    - neurology consulted f/u recs  - neurosurgery consulted f/u recs  - palliative consulted f/u recs  - anoxic brain injury, MAP >60 or SBP>100  - c/w dextrose 5 150cc/hr, fwf 350cc q3  - q4 BMP  - targeted temperature management, maintain normothermia (32 - 37.5C)  - f/u neuron specific enolase (May 20/21/22)    #Anxiety / Depression  #Bipolar 1 Diagnosis  On lithium 450mg in am and 450mg qpm. lithium level low at 0.26. On hctz. Lithium levels should be elevated iso of hctz use, however level indicates underdosing. Unknown last change in regimen. Last refilled 4/25/24. Psychiatrist at [facility].  - hold home med    #Intubated  - off sedation    #C/f hepatic encephalopathy (resolved)  - d/yordan lactulose 20g q6  - c/w rifaximin 550 bid    Pulm  #Intubation  On ventilator.  Current settings: FIO2 40; ; RR 20; PEEP 5  ABG with elevation in CO2 5/21  - increased RR to 20 from 18    Cards  #Cardiac Arrest  ACLS x2. ROSC obtained.  UTox positive for cocaine; however no signs of vasospasm > acute coronary syndrome with negative trops and non-ischemic EKG. High suspicion of fentanyl overdose leading to respiratory depression > hypercapnia > acidosis > cardiac arrest  - f/u fentanyl level  - Pacer pads in place   - follow cerebral edema/neuroprotective protocol outlined above     #HTN  home medications Losartan 100mg, HCTZ 25mg, Norvasc 6.25 BID   - holding home meds    #HLD  home medication Atorvastatin 40mg    - holding home Lipitor 40    GI  #Transaminitis  #C/f liver laceration  Likely shock liver i/s/o cardiac arrest. Distension on physical exam  AST/ALT ratio 1:1; serum alcohol negative. unlikely alcohol induced liver injury  CTAP: 5cm hypodense finding of liver representing irregular focal fatty infiltration vs hepatic laceration  Maps decreasing 5/22 likely 2/2 increased UOP, less likely 2/2 bleed  - serial abdominal exams    #Ileus  likely 2/2 to fentanyl overdose. Found with drug paraphernalia nearby.  CT Abdomen -colon dilated to 8 cm with gas and semi solid/liquid stool. Stomach distended with gas and fluid.   - OG decompression  - rectal tube    #Chronic constipation  Home medications: Dulcolax and Miralax qd for constipation    #Chronic GERD  Home medication: omeprazole 40 qd    Renal  cristóbal    Rheum  #RA  Managed on Humira 40mg, MTX 2.5mg, and Naproxen PRN.    - hold Humira, MTX, leukovorin, and Naproxen    Endo  CRISTÓBAL    ID  #C/f staph epidermidis bacteremia  Blood culture revealing staph epidermidis,  - on vancomycin  - f/u surveillance blood culture    Heme  #Chronic MARCELL  Low HGB as of 08/2023, per outside records  Home medication: Ferrous Sulfate    PPX  E: Replete  N: NPO  GI: ppi qD  DVT ppx: lovenox  Dispo: MICU     FULL CODE Assessment: 44 pmhx of bipolar (on lithium), htn (hctz), HLD, RA, GERD, hemorrhoids, MARCELL, lupus and fibromyalgia found to be in cardiac arrest possible 2/2 drug overdose (cocaine w/ possibly laced with fentanyl) admitted to the micu for post resuscitation care.    Neuro  #Brain death  5/22 performed 72 hours post cardiac resuscitation. Patient without confounding conditions  Coma confirmed without sign of arousal, or response to noxious stimulus (nailbed). Brainstem areflexia confirmed with absence of pupilar light reflex, vestibuloocular reflex, corneal reflex, gag reflex, cough reflex. Positive apnea testing performed w/ preoxygenation with 100% fio2, pCO2 greater that 60. No further ancilllary brain death testing.  - organ donation    #Anoxic brain injury 2/2 to cardiac arrest  UTox positive for cocaine.  CT Head - effacement of the cerebral sulci, ventricles and cisternal spaces secondary to cerebral edema. Findings consistent with anoxic brain injury. s/p 23% saline x1, and mannitol 100 x1  Na 163 5/21 ovn, started on d5 gtt    - palliative consulted f/u recs  - anoxic brain injury, MAP >60 or SBP>100  - c/w dextrose 5 150cc/hr, fwf 350cc q3  - targeted temperature management, maintain normothermia (32 - 37.5C)    #Anxiety / Depression  #Bipolar 1 Diagnosis  On lithium 450mg in am and 450mg qpm. lithium level low at 0.26. On hctz. Lithium levels should be elevated iso of hctz use, however level indicates underdosing. Unknown last change in regimen. Last refilled 4/25/24. Psychiatrist at [facility].    #Intubated  - off sedation    #C/f hepatic encephalopathy (resolved)  - c/w rifaximin 550 bid    Pulm  #Intubation  On ventilator PC AC  Current settings: FIO2 50; Pressure control 24; RR 20; PEEP 10  ABG with elevation in CO2 5/21  - f/u bronchoscopy    Cards  #Cardiac Arrest  ACLS x2. ROSC obtained.  UTox positive for cocaine; however no signs of vasospasm > acute coronary syndrome with negative trops and non-ischemic EKG. High suspicion of fentanyl overdose leading to respiratory depression > hypercapnia > acidosis > cardiac arrest  - f/u right and left cath  - f/u fentanyl level  - Pacer pads in place     #HTN  home medications Losartan 100mg, HCTZ 25mg, Norvasc 6.25 BID.   - iv hydral 5 prn    #HLD  home medication Atorvastatin 40mg     GI  #Transaminitis  #C/f liver laceration  Likely shock liver i/s/o cardiac arrest. Distension on physical exam  AST/ALT ratio 1:1; serum alcohol negative. unlikely alcohol induced liver injury  CTAP: 5cm hypodense finding of liver representing irregular focal fatty infiltration vs hepatic laceration  Maps decreasing 5/22 likely 2/2 increased UOP, less likely 2/2 bleed  - serial abdominal exams    #Ileus  likely 2/2 to fentanyl overdose. Found with drug paraphernalia nearby.  CT Abdomen -colon dilated to 8 cm with gas and semi solid/liquid stool. Stomach distended with gas and fluid.   - OG decompression  - rectal tube    #Chronic constipation  Home medications: Dulcolax and Miralax qd for constipation    #Chronic GERD  Home medication: omeprazole 40 qd    Renal  cristóbal    Rheum  #RA  Managed on Humira 40mg, MTX 2.5mg, and Naproxen PRN.    - hold Humira, MTX, leukovorin, and Naproxen    Endo  CRISTÓBAL    ID  #C/f staph epidermidis bacteremia  Blood culture revealing staph epidermidis,  - on vancomycin  - f/u surveillance blood culture    Heme  #Chronic MARCELL  Low HGB as of 08/2023, per outside records  Home medication: Ferrous Sulfate    PPX  E: Replete  N: NPO  GI: ppi qD  DVT ppx: lovenox  Dispo: MICU     FULL CODE Assessment: 44 pmhx of bipolar (on lithium), htn (hctz), HLD, RA, GERD, hemorrhoids, MARCELL, lupus and fibromyalgia found to be in cardiac arrest possible 2/2 drug overdose (cocaine w/ possibly laced with fentanyl) admitted to the micu for post resuscitation care.    Neuro  #Brain death  5/22 performed 72 hours post cardiac resuscitation. Patient without confounding conditions  Coma confirmed without sign of arousal, or response to noxious stimulus (nailbed). Brainstem areflexia confirmed with absence of pupilar light reflex, vestibuloocular reflex, corneal reflex, gag reflex, cough reflex. Positive apnea testing performed w/ preoxygenation with 100% fio2, pCO2 greater that 60. No further ancilllary brain death testing.  - organ donation    #Anoxic brain injury 2/2 to cardiac arrest  UTox positive for cocaine.  CT Head - effacement of the cerebral sulci, ventricles and cisternal spaces secondary to cerebral edema. Findings consistent with anoxic brain injury. s/p 23% saline x1, and mannitol 100 x1  Na 163 5/21 ovn, started on d5 gtt    - palliative consulted f/u recs  - c/w dextrose 5 150cc/hr, fwf 350cc q3  - targeted temperature management, maintain normothermia (32 - 37.5C)    #Anxiety / Depression  #Bipolar 1 Diagnosis  On lithium 450mg in am and 450mg qpm. lithium level low at 0.26. On hctz. Lithium levels should be elevated iso of hctz use, however level indicates underdosing. Unknown last change in regimen. Last refilled 4/25/24. Psychiatrist at [facility].    #Intubated  - off sedation    #C/f hepatic encephalopathy (resolved)  - c/w rifaximin 550 bid    Pulm  #Intubation  On ventilator PC AC  Current settings: FIO2 50; Pressure control 24; RR 20; PEEP 10  ABG with elevation in CO2 5/21  - f/u bronchoscopy    Cards  #Cardiac Arrest  ACLS x2. ROSC obtained.  UTox positive for cocaine; however no signs of vasospasm > acute coronary syndrome with negative trops and non-ischemic EKG. High suspicion of fentanyl overdose leading to respiratory depression > hypercapnia > acidosis > cardiac arrest  - f/u right and left cath  - f/u fentanyl level  - Pacer pads in place     #HTN  home medications Losartan 100mg, HCTZ 25mg, Norvasc 6.25 BID.   - iv hydral 5 prn    #HLD  home medication Atorvastatin 40mg     GI  #Transaminitis  #C/f liver laceration  Likely shock liver i/s/o cardiac arrest. Distension on physical exam  AST/ALT ratio 1:1; serum alcohol negative. unlikely alcohol induced liver injury  CTAP: 5cm hypodense finding of liver representing irregular focal fatty infiltration vs hepatic laceration  Maps decreasing 5/22 likely 2/2 increased UOP, less likely 2/2 bleed  - ctm with abdominal exams    #Ileus  likely 2/2 to fentanyl overdose. Found with drug paraphernalia nearby.  CT Abdomen -colon dilated to 8 cm with gas and semi solid/liquid stool. Stomach distended with gas and fluid.   - OG decompression  - rectal tube    #Chronic constipation  Home medications: Dulcolax and Miralax qd for constipation    #Chronic GERD  Home medication: omeprazole 40 qd    Renal  cristóbal    Rheum  #RA  Managed on Humira 40mg, MTX 2.5mg, and Naproxen PRN.    - hold Humira, MTX, leukovorin, and Naproxen    Endo  CRISTÓBAL    ID  #C/f staph epidermidis bacteremia  Blood culture revealing staph epidermidis, D/yordan vanc  - on zosyn  - f/u surveillance blood culture    Heme  #Chronic MARCELL  Low HGB as of 08/2023, per outside records  Home medication: Ferrous Sulfate    PPX  E: Replete  N: NPO  GI: ppi qD  DVT ppx: lovenox  Dispo: MICU     FULL CODE Assessment: 44 pmhx of bipolar (on lithium), htn (hctz), HLD, RA, GERD, hemorrhoids, MARCELL, lupus and fibromyalgia found to be in cardiac arrest possible 2/2 drug overdose (cocaine w/ possibly laced with fentanyl) admitted to the micu for post resuscitation care.    Neuro  #Brain death  5/22 performed 72 hours post cardiac resuscitation. Patient without confounding conditions  Coma confirmed without sign of arousal, or response to noxious stimulus (nailbed). Brainstem areflexia confirmed with absence of pupilar light reflex, vestibuloocular reflex, corneal reflex, gag reflex, cough reflex. Positive apnea testing performed w/ preoxygenation with 100% fio2, pCO2 greater that 60. No further ancilllary brain death testing.  - family requesting organ donation    #Anoxic brain injury 2/2 to cardiac arrest  UTox positive for cocaine.  CT Head - effacement of the cerebral sulci, ventricles and cisternal spaces secondary to cerebral edema. Findings consistent with anoxic brain injury. s/p 23% saline x1, and mannitol 100 x1  Na 163 5/21 ovn, started on d5 gtt  - palliative consulted f/u recs  - c/w dextrose 5 150cc/hr, fwf 350cc q3  - targeted temperature management, maintain normothermia (32 - 37.5C)    #Anxiety / Depression  #Bipolar 1 Diagnosis  On lithium 450mg in am and 450mg qpm. lithium level low at 0.26. On hctz. Lithium levels should be elevated iso of hctz use, however level indicates underdosing. Unknown last change in regimen. Last refilled 4/25/24. Psychiatrist at [facility].    #Intubated  - off sedation    #C/f hepatic encephalopathy (resolved)  - c/w rifaximin 550 bid    Pulm  #Intubation  On ventilator PC AC  Current settings: FIO2 50; Pressure control 24; RR 20; PEEP 10  ABG with elevation in CO2 5/21  - f/u bronchoscopy    Cards  #Cardiac Arrest  ACLS x2. ROSC obtained.  UTox positive for cocaine; however no signs of vasospasm > acute coronary syndrome with negative trops and non-ischemic EKG. High suspicion of fentanyl overdose leading to respiratory depression > hypercapnia > acidosis > cardiac arrest  - f/u right and left cath  - f/u fentanyl level  - Pacer pads in place     #HTN  home medications Losartan 100mg, HCTZ 25mg, Norvasc 6.25 BID.   - iv hydral 5 prn    #HLD  home medication Atorvastatin 40mg     GI  #Transaminitis  #C/f liver laceration  Likely shock liver i/s/o cardiac arrest. Distension on physical exam  AST/ALT ratio 1:1; serum alcohol negative. unlikely alcohol induced liver injury  CTAP: 5cm hypodense finding of liver representing irregular focal fatty infiltration vs hepatic laceration  Maps decreasing 5/22 likely 2/2 increased UOP, less likely 2/2 bleed  - ctm with abdominal exams    #Ileus  likely 2/2 to fentanyl overdose. Found with drug paraphernalia nearby.  CT Abdomen -colon dilated to 8 cm with gas and semi solid/liquid stool. Stomach distended with gas and fluid.   - OG decompression  - rectal tube    #Chronic constipation  Home medications: Dulcolax and Miralax qd for constipation    #Chronic GERD  Home medication: omeprazole 40 qd    Renal  cristóbal    Rheum  #RA  Managed on Humira 40mg, MTX 2.5mg, and Naproxen PRN.    - hold Humira, MTX, leukovorin, and Naproxen    Endo  CRISTÓBAL    ID  #C/f staph epidermidis bacteremia  Blood culture revealing staph epidermidis, D/yordan vanc  - on zosyn  - f/u surveillance blood culture    Heme  #Chronic MARCELL  Low HGB as of 08/2023, per outside records  Home medication: Ferrous Sulfate    PPX  E: Replete  N: NPO  GI: ppi qD  DVT ppx: lovenox  Dispo: MICU     FULL CODE

## 2024-05-23 NOTE — CONSULT NOTE ADULT - ATTENDING COMMENTS
Bronchoscopic evaluation as part of organ donation protocol.
45 y/o woman admitted to MICU after out of hospital cardiac arrest and in hospital arrest in ED. Suspected drug overdose as patient was found with pipes and needles, Utox positive for cocaine. CTH concerning for significant anoxic brain injury and patient without pupillary, corneal, or gag reflex on exam with fixed and dilated pupils. Possible Cushing reflex (absent bradycardia) in the setting of suspected increased ICP from cerebral edema. CT chest with bilateral lower lobe consolidations likely aspiration. CT abdomen with significantly dilated loops of bowel. Hemodynamically stable and hypertensive. Patient with very poor prognosis, team spoke to daughter who is in [place] over the phone and wants to maintain full code for now, but planning to come here ASAP.     Plan  - Very poor prognosis  - Prevention of fever, maintain normothermia  - Continuous EEG  - Empiric antibiotics, infectious workup sent  - NG decompression  - Formal TTE  - Lung protective ventilation  - Hyperventilating to combat metabolic acidosis and for suspected increased ICP  - Trend lactate for improvement  - Palliative consult in AM and continued GOC discussions
Admitted after cardiac arrest, unclear downtime, likely precipitated by drug overdose (cocaine positive on utox)  No brainstem reflexes on exam and no response to pain; pupils mid-position and nonreactive, right eye slightly lower than the left, vestibulocular reflex absent  CT head with cerebral edema, some loss of deep grey-white differentiation but cortical-subcortical grey-white differentiation appears intact   It is unusual for this degree of edema to be present this soon after cardiac arrest raising suspicion for alternate etiology of edema  Ammonia is elevated at 104 which can cause cerebral edema and is potentially treatable, therefore recommend maximal medical management for possible hyperammonemic encephalopathy - mannitol, hypertonic saline, maintain hypernatremia, start lactulose and rifaximin   Continue EEG  MRI brain when stable  f/u NSE levels  Will follow

## 2024-05-23 NOTE — CONSULT NOTE ADULT - ASSESSMENT
44F Bipolar, SLE. RA s/p cardiac arrest and now brain death after drug overdose. Pulmonary consulted for bronchoscopy for organ donation evaluation      Bed-side bronch performed. See note

## 2024-05-23 NOTE — PROCEDURE NOTE - NSBRONCHPROCDETAILS_GEN_A_CORE_FT
Bronchoscope inserted through ETT. Airway evaluation revealed Sharp Marce. YRIS and LLL evaluation revealed normal appearing mucosa with minimal secretions. RUL, RML, RLL revealed normal appearing mucosa with minimal secretions. Two separate BALs were taken from the RML, 20 cc instilled with 10 cc return x 2. ETT noted to be in good position. Bronchoscope then withdrawn from ETT. No bleeding noted

## 2024-05-23 NOTE — CONSULT NOTE ADULT - CONSULT REQUESTED DATE/TIME
19-May-2024 22:34
20-May-2024 03:19
20-May-2024 16:17
20-May-2024 12:16
20-May-2024 12:54
23-May-2024 19:04

## 2024-05-24 LAB
ALBUMIN SERPL ELPH-MCNC: 2.8 G/DL — LOW (ref 3.3–5)
ALBUMIN SERPL ELPH-MCNC: 3.2 G/DL — LOW (ref 3.3–5)
ALBUMIN SERPL ELPH-MCNC: 3.3 G/DL — SIGNIFICANT CHANGE UP (ref 3.3–5)
ALBUMIN SERPL ELPH-MCNC: 3.5 G/DL — SIGNIFICANT CHANGE UP (ref 3.3–5)
ALP SERPL-CCNC: 76 U/L — SIGNIFICANT CHANGE UP (ref 40–120)
ALP SERPL-CCNC: 78 U/L — SIGNIFICANT CHANGE UP (ref 40–120)
ALP SERPL-CCNC: 79 U/L — SIGNIFICANT CHANGE UP (ref 40–120)
ALP SERPL-CCNC: 79 U/L — SIGNIFICANT CHANGE UP (ref 40–120)
ALT FLD-CCNC: 118 U/L — HIGH (ref 10–45)
ALT FLD-CCNC: 128 U/L — HIGH (ref 10–45)
ALT FLD-CCNC: 139 U/L — HIGH (ref 10–45)
ALT FLD-CCNC: 157 U/L — HIGH (ref 10–45)
AMYLASE P1 CFR SERPL: 105 U/L — SIGNIFICANT CHANGE UP (ref 25–125)
AMYLASE P1 CFR SERPL: 132 U/L — HIGH (ref 25–125)
AMYLASE P1 CFR SERPL: 181 U/L — HIGH (ref 25–125)
AMYLASE P1 CFR SERPL: 91 U/L — SIGNIFICANT CHANGE UP (ref 25–125)
ANION GAP SERPL CALC-SCNC: 10 MMOL/L — SIGNIFICANT CHANGE UP (ref 5–17)
ANION GAP SERPL CALC-SCNC: 11 MMOL/L — SIGNIFICANT CHANGE UP (ref 5–17)
ANION GAP SERPL CALC-SCNC: 12 MMOL/L — SIGNIFICANT CHANGE UP (ref 5–17)
ANION GAP SERPL CALC-SCNC: 13 MMOL/L — SIGNIFICANT CHANGE UP (ref 5–17)
ANISOCYTOSIS BLD QL: SLIGHT — SIGNIFICANT CHANGE UP
ANISOCYTOSIS BLD QL: SLIGHT — SIGNIFICANT CHANGE UP
APPEARANCE UR: ABNORMAL
APTT BLD: 25.7 SEC — SIGNIFICANT CHANGE UP (ref 24.5–35.6)
APTT BLD: 26.2 SEC — SIGNIFICANT CHANGE UP (ref 24.5–35.6)
APTT BLD: 26.9 SEC — SIGNIFICANT CHANGE UP (ref 24.5–35.6)
APTT BLD: 27.4 SEC — SIGNIFICANT CHANGE UP (ref 24.5–35.6)
AST SERPL-CCNC: 123 U/L — HIGH (ref 10–40)
AST SERPL-CCNC: 69 U/L — HIGH (ref 10–40)
AST SERPL-CCNC: 82 U/L — HIGH (ref 10–40)
AST SERPL-CCNC: 92 U/L — HIGH (ref 10–40)
BACTERIA # UR AUTO: NEGATIVE /HPF — SIGNIFICANT CHANGE UP
BASE EXCESS BLDA CALC-SCNC: 0 MMOL/L — SIGNIFICANT CHANGE UP (ref -2–3)
BASE EXCESS BLDA CALC-SCNC: 0.1 MMOL/L — SIGNIFICANT CHANGE UP (ref -2–3)
BASE EXCESS BLDA CALC-SCNC: 0.5 MMOL/L — SIGNIFICANT CHANGE UP (ref -2–3)
BASE EXCESS BLDA CALC-SCNC: 2.4 MMOL/L — SIGNIFICANT CHANGE UP (ref -2–3)
BASE EXCESS BLDA CALC-SCNC: 2.7 MMOL/L — SIGNIFICANT CHANGE UP (ref -2–3)
BASOPHILS # BLD AUTO: 0 K/UL — SIGNIFICANT CHANGE UP (ref 0–0.2)
BASOPHILS # BLD AUTO: 0 K/UL — SIGNIFICANT CHANGE UP (ref 0–0.2)
BASOPHILS # BLD AUTO: 0.01 K/UL — SIGNIFICANT CHANGE UP (ref 0–0.2)
BASOPHILS # BLD AUTO: 0.02 K/UL — SIGNIFICANT CHANGE UP (ref 0–0.2)
BASOPHILS NFR BLD AUTO: 0 % — SIGNIFICANT CHANGE UP (ref 0–2)
BASOPHILS NFR BLD AUTO: 0 % — SIGNIFICANT CHANGE UP (ref 0–2)
BASOPHILS NFR BLD AUTO: 0.1 % — SIGNIFICANT CHANGE UP (ref 0–2)
BASOPHILS NFR BLD AUTO: 0.1 % — SIGNIFICANT CHANGE UP (ref 0–2)
BILIRUB DIRECT SERPL-MCNC: <0.2 MG/DL — SIGNIFICANT CHANGE UP (ref 0–0.3)
BILIRUB INDIRECT FLD-MCNC: SIGNIFICANT CHANGE UP (ref 0.2–1)
BILIRUB INDIRECT FLD-MCNC: SIGNIFICANT CHANGE UP MG/DL (ref 0.2–1)
BILIRUB SERPL-MCNC: 0.2 MG/DL — SIGNIFICANT CHANGE UP (ref 0.2–1.2)
BILIRUB SERPL-MCNC: 0.3 MG/DL — SIGNIFICANT CHANGE UP (ref 0.2–1.2)
BILIRUB UR-MCNC: NEGATIVE — SIGNIFICANT CHANGE UP
BUN SERPL-MCNC: 15 MG/DL — SIGNIFICANT CHANGE UP (ref 7–23)
BUN SERPL-MCNC: 15 MG/DL — SIGNIFICANT CHANGE UP (ref 7–23)
BUN SERPL-MCNC: 17 MG/DL — SIGNIFICANT CHANGE UP (ref 7–23)
BUN SERPL-MCNC: 18 MG/DL — SIGNIFICANT CHANGE UP (ref 7–23)
CALCIUM SERPL-MCNC: 8.1 MG/DL — LOW (ref 8.4–10.5)
CALCIUM SERPL-MCNC: 8.1 MG/DL — LOW (ref 8.4–10.5)
CALCIUM SERPL-MCNC: 8.3 MG/DL — LOW (ref 8.4–10.5)
CALCIUM SERPL-MCNC: 8.4 MG/DL — SIGNIFICANT CHANGE UP (ref 8.4–10.5)
CAST: 5 /LPF — HIGH (ref 0–4)
CHLORIDE SERPL-SCNC: 105 MMOL/L — SIGNIFICANT CHANGE UP (ref 96–108)
CHLORIDE SERPL-SCNC: 106 MMOL/L — SIGNIFICANT CHANGE UP (ref 96–108)
CHLORIDE SERPL-SCNC: 106 MMOL/L — SIGNIFICANT CHANGE UP (ref 96–108)
CHLORIDE SERPL-SCNC: 108 MMOL/L — SIGNIFICANT CHANGE UP (ref 96–108)
CK MB CFR SERPL CALC: 12.5 NG/ML — HIGH (ref 0–6.7)
CK MB CFR SERPL CALC: 3.2 NG/ML — SIGNIFICANT CHANGE UP (ref 0–6.7)
CK MB CFR SERPL CALC: 5.3 NG/ML — SIGNIFICANT CHANGE UP (ref 0–6.7)
CK MB CFR SERPL CALC: 7.3 NG/ML — HIGH (ref 0–6.7)
CK SERPL-CCNC: 1229 U/L — HIGH (ref 25–170)
CK SERPL-CCNC: 1955 U/L — HIGH (ref 25–170)
CK SERPL-CCNC: 627 U/L — HIGH (ref 25–170)
CK SERPL-CCNC: 920 U/L — HIGH (ref 25–170)
CO2 BLDA-SCNC: 26 MMOL/L — HIGH (ref 19–24)
CO2 BLDA-SCNC: 27 MMOL/L — HIGH (ref 19–24)
CO2 SERPL-SCNC: 22 MMOL/L — SIGNIFICANT CHANGE UP (ref 22–31)
CO2 SERPL-SCNC: 23 MMOL/L — SIGNIFICANT CHANGE UP (ref 22–31)
CO2 SERPL-SCNC: 23 MMOL/L — SIGNIFICANT CHANGE UP (ref 22–31)
CO2 SERPL-SCNC: 24 MMOL/L — SIGNIFICANT CHANGE UP (ref 22–31)
COLOR SPEC: YELLOW — SIGNIFICANT CHANGE UP
CREAT SERPL-MCNC: 0.64 MG/DL — SIGNIFICANT CHANGE UP (ref 0.5–1.3)
CREAT SERPL-MCNC: 0.64 MG/DL — SIGNIFICANT CHANGE UP (ref 0.5–1.3)
CREAT SERPL-MCNC: 0.67 MG/DL — SIGNIFICANT CHANGE UP (ref 0.5–1.3)
CREAT SERPL-MCNC: 0.71 MG/DL — SIGNIFICANT CHANGE UP (ref 0.5–1.3)
CT-PROAVP SERPL IA-SCNC: 6.3 PMOL/L — SIGNIFICANT CHANGE UP
CULTURE RESULTS: ABNORMAL
DIFF PNL FLD: ABNORMAL
EGFR: 106 ML/MIN/1.73M2 — SIGNIFICANT CHANGE UP
EGFR: 109 ML/MIN/1.73M2 — SIGNIFICANT CHANGE UP
EGFR: 110 ML/MIN/1.73M2 — SIGNIFICANT CHANGE UP
EGFR: 110 ML/MIN/1.73M2 — SIGNIFICANT CHANGE UP
EOSINOPHIL # BLD AUTO: 0 K/UL — SIGNIFICANT CHANGE UP (ref 0–0.5)
EOSINOPHIL NFR BLD AUTO: 0 % — SIGNIFICANT CHANGE UP (ref 0–6)
FIBRINOGEN PPP-MCNC: 640 MG/DL — HIGH (ref 200–445)
FIBRINOGEN PPP-MCNC: 642 MG/DL — HIGH (ref 200–445)
FIBRINOGEN PPP-MCNC: 680 MG/DL — HIGH (ref 200–445)
FIBRINOGEN PPP-MCNC: 751 MG/DL — HIGH (ref 200–445)
GAS PNL BLDA: SIGNIFICANT CHANGE UP
GIANT PLATELETS BLD QL SMEAR: PRESENT — SIGNIFICANT CHANGE UP
GLUCOSE BLDC GLUCOMTR-MCNC: 202 MG/DL — HIGH (ref 70–99)
GLUCOSE BLDC GLUCOMTR-MCNC: 210 MG/DL — HIGH (ref 70–99)
GLUCOSE BLDC GLUCOMTR-MCNC: 282 MG/DL — HIGH (ref 70–99)
GLUCOSE SERPL-MCNC: 217 MG/DL — HIGH (ref 70–99)
GLUCOSE SERPL-MCNC: 217 MG/DL — HIGH (ref 70–99)
GLUCOSE SERPL-MCNC: 230 MG/DL — HIGH (ref 70–99)
GLUCOSE SERPL-MCNC: 256 MG/DL — HIGH (ref 70–99)
GLUCOSE UR QL: NEGATIVE MG/DL — SIGNIFICANT CHANGE UP
GRAM STN FLD: SIGNIFICANT CHANGE UP
HCO3 BLDA-SCNC: 25 MMOL/L — SIGNIFICANT CHANGE UP (ref 21–28)
HCO3 BLDA-SCNC: 26 MMOL/L — SIGNIFICANT CHANGE UP (ref 21–28)
HCT VFR BLD CALC: 24.5 % — LOW (ref 34.5–45)
HCT VFR BLD CALC: 26.1 % — LOW (ref 34.5–45)
HCT VFR BLD CALC: 26.5 % — LOW (ref 34.5–45)
HCT VFR BLD CALC: 29.4 % — LOW (ref 34.5–45)
HGB BLD-MCNC: 7.9 G/DL — LOW (ref 11.5–15.5)
HGB BLD-MCNC: 8.4 G/DL — LOW (ref 11.5–15.5)
HGB BLD-MCNC: 8.4 G/DL — LOW (ref 11.5–15.5)
HGB BLD-MCNC: 9 G/DL — LOW (ref 11.5–15.5)
HYPOCHROMIA BLD QL: SIGNIFICANT CHANGE UP
HYPOCHROMIA BLD QL: SLIGHT — SIGNIFICANT CHANGE UP
IMM GRANULOCYTES NFR BLD AUTO: 0.9 % — SIGNIFICANT CHANGE UP (ref 0–0.9)
IMM GRANULOCYTES NFR BLD AUTO: 1.1 % — HIGH (ref 0–0.9)
INR BLD: 0.96 — SIGNIFICANT CHANGE UP (ref 0.85–1.18)
INR BLD: 0.96 — SIGNIFICANT CHANGE UP (ref 0.85–1.18)
INR BLD: 0.98 — SIGNIFICANT CHANGE UP (ref 0.85–1.18)
INR BLD: 0.99 — SIGNIFICANT CHANGE UP (ref 0.85–1.18)
KETONES UR-MCNC: NEGATIVE MG/DL — SIGNIFICANT CHANGE UP
LDH SERPL L TO P-CCNC: 551 U/L — HIGH (ref 50–242)
LDH SERPL L TO P-CCNC: 599 U/L — HIGH (ref 50–242)
LDH SERPL L TO P-CCNC: 657 U/L — HIGH (ref 50–242)
LEUKOCYTE ESTERASE UR-ACNC: NEGATIVE — SIGNIFICANT CHANGE UP
LIDOCAIN IGE QN: 105 U/L — HIGH (ref 7–60)
LIDOCAIN IGE QN: 136 U/L — HIGH (ref 7–60)
LIDOCAIN IGE QN: 42 U/L — SIGNIFICANT CHANGE UP (ref 7–60)
LIDOCAIN IGE QN: 60 U/L — SIGNIFICANT CHANGE UP (ref 7–60)
LYMPHOCYTES # BLD AUTO: 0.56 K/UL — LOW (ref 1–3.3)
LYMPHOCYTES # BLD AUTO: 0.61 K/UL — LOW (ref 1–3.3)
LYMPHOCYTES # BLD AUTO: 0.82 K/UL — LOW (ref 1–3.3)
LYMPHOCYTES # BLD AUTO: 1.09 K/UL — SIGNIFICANT CHANGE UP (ref 1–3.3)
LYMPHOCYTES # BLD AUTO: 3.5 % — LOW (ref 13–44)
LYMPHOCYTES # BLD AUTO: 4.8 % — LOW (ref 13–44)
LYMPHOCYTES # BLD AUTO: 5.5 % — LOW (ref 13–44)
LYMPHOCYTES # BLD AUTO: 6.9 % — LOW (ref 13–44)
MAGNESIUM SERPL-MCNC: 1.8 MG/DL — SIGNIFICANT CHANGE UP (ref 1.6–2.6)
MAGNESIUM SERPL-MCNC: 2 MG/DL — SIGNIFICANT CHANGE UP (ref 1.6–2.6)
MAGNESIUM SERPL-MCNC: 2 MG/DL — SIGNIFICANT CHANGE UP (ref 1.6–2.6)
MAGNESIUM SERPL-MCNC: 2.1 MG/DL — SIGNIFICANT CHANGE UP (ref 1.6–2.6)
MANUAL SMEAR VERIFICATION: SIGNIFICANT CHANGE UP
MANUAL SMEAR VERIFICATION: SIGNIFICANT CHANGE UP
MCHC RBC-ENTMCNC: 25.7 PG — LOW (ref 27–34)
MCHC RBC-ENTMCNC: 26.5 PG — LOW (ref 27–34)
MCHC RBC-ENTMCNC: 26.5 PG — LOW (ref 27–34)
MCHC RBC-ENTMCNC: 26.6 PG — LOW (ref 27–34)
MCHC RBC-ENTMCNC: 30.6 GM/DL — LOW (ref 32–36)
MCHC RBC-ENTMCNC: 31.7 GM/DL — LOW (ref 32–36)
MCHC RBC-ENTMCNC: 32.2 GM/DL — SIGNIFICANT CHANGE UP (ref 32–36)
MCHC RBC-ENTMCNC: 32.2 GM/DL — SIGNIFICANT CHANGE UP (ref 32–36)
MCV RBC AUTO: 82.3 FL — SIGNIFICANT CHANGE UP (ref 80–100)
MCV RBC AUTO: 82.5 FL — SIGNIFICANT CHANGE UP (ref 80–100)
MCV RBC AUTO: 83.6 FL — SIGNIFICANT CHANGE UP (ref 80–100)
MCV RBC AUTO: 84 FL — SIGNIFICANT CHANGE UP (ref 80–100)
MICROCYTES BLD QL: SLIGHT — SIGNIFICANT CHANGE UP
MICROCYTES BLD QL: SLIGHT — SIGNIFICANT CHANGE UP
MONOCYTES # BLD AUTO: 0.41 K/UL — SIGNIFICANT CHANGE UP (ref 0–0.9)
MONOCYTES # BLD AUTO: 0.55 K/UL — SIGNIFICANT CHANGE UP (ref 0–0.9)
MONOCYTES # BLD AUTO: 0.99 K/UL — HIGH (ref 0–0.9)
MONOCYTES # BLD AUTO: 1.06 K/UL — HIGH (ref 0–0.9)
MONOCYTES NFR BLD AUTO: 3.5 % — SIGNIFICANT CHANGE UP (ref 2–14)
MONOCYTES NFR BLD AUTO: 3.5 % — SIGNIFICANT CHANGE UP (ref 2–14)
MONOCYTES NFR BLD AUTO: 6.1 % — SIGNIFICANT CHANGE UP (ref 2–14)
MONOCYTES NFR BLD AUTO: 6.7 % — SIGNIFICANT CHANGE UP (ref 2–14)
NEUTROPHILS # BLD AUTO: 10.68 K/UL — HIGH (ref 1.8–7.4)
NEUTROPHILS # BLD AUTO: 12.8 K/UL — HIGH (ref 1.8–7.4)
NEUTROPHILS # BLD AUTO: 14.2 K/UL — HIGH (ref 1.8–7.4)
NEUTROPHILS # BLD AUTO: 15.75 K/UL — HIGH (ref 1.8–7.4)
NEUTROPHILS NFR BLD AUTO: 86.6 % — HIGH (ref 43–77)
NEUTROPHILS NFR BLD AUTO: 86.9 % — HIGH (ref 43–77)
NEUTROPHILS NFR BLD AUTO: 89.6 % — HIGH (ref 43–77)
NEUTROPHILS NFR BLD AUTO: 90.7 % — HIGH (ref 43–77)
NEUTS BAND # BLD: 3.5 % — SIGNIFICANT CHANGE UP (ref 0–8)
NITRITE UR-MCNC: NEGATIVE — SIGNIFICANT CHANGE UP
NRBC # BLD: 0 /100 WBCS — SIGNIFICANT CHANGE UP (ref 0–0)
NRBC # BLD: 0 /100 WBCS — SIGNIFICANT CHANGE UP (ref 0–0)
ORGANISM # SPEC MICROSCOPIC CNT: ABNORMAL
ORGANISM # SPEC MICROSCOPIC CNT: SIGNIFICANT CHANGE UP
OVALOCYTES BLD QL SMEAR: SLIGHT — SIGNIFICANT CHANGE UP
PCO2 BLDA: 32 MMHG — SIGNIFICANT CHANGE UP (ref 32–45)
PCO2 BLDA: 36 MMHG — SIGNIFICANT CHANGE UP (ref 32–45)
PCO2 BLDA: 37 MMHG — SIGNIFICANT CHANGE UP (ref 32–45)
PCO2 BLDA: 39 MMHG — SIGNIFICANT CHANGE UP (ref 32–45)
PCO2 BLDA: 40 MMHG — SIGNIFICANT CHANGE UP (ref 32–45)
PH BLDA: 7.4 — SIGNIFICANT CHANGE UP (ref 7.35–7.45)
PH BLDA: 7.41 — SIGNIFICANT CHANGE UP (ref 7.35–7.45)
PH BLDA: 7.43 — SIGNIFICANT CHANGE UP (ref 7.35–7.45)
PH BLDA: 7.47 — HIGH (ref 7.35–7.45)
PH BLDA: 7.5 — HIGH (ref 7.35–7.45)
PH UR: 6 — SIGNIFICANT CHANGE UP (ref 5–8)
PHOSPHATE SERPL-MCNC: 1.8 MG/DL — LOW (ref 2.5–4.5)
PHOSPHATE SERPL-MCNC: 1.9 MG/DL — LOW (ref 2.5–4.5)
PHOSPHATE SERPL-MCNC: 2.1 MG/DL — LOW (ref 2.5–4.5)
PHOSPHATE SERPL-MCNC: 2.2 MG/DL — LOW (ref 2.5–4.5)
PLAT MORPH BLD: ABNORMAL
PLAT MORPH BLD: NORMAL — SIGNIFICANT CHANGE UP
PLATELET # BLD AUTO: 182 K/UL — SIGNIFICANT CHANGE UP (ref 150–400)
PLATELET # BLD AUTO: 211 K/UL — SIGNIFICANT CHANGE UP (ref 150–400)
PLATELET # BLD AUTO: 213 K/UL — SIGNIFICANT CHANGE UP (ref 150–400)
PLATELET # BLD AUTO: 221 K/UL — SIGNIFICANT CHANGE UP (ref 150–400)
PO2 BLDA: 218 MMHG — HIGH (ref 83–108)
PO2 BLDA: 235 MMHG — HIGH (ref 83–108)
PO2 BLDA: 313 MMHG — HIGH (ref 83–108)
PO2 BLDA: 361 MMHG — HIGH (ref 83–108)
PO2 BLDA: 392 MMHG — HIGH (ref 83–108)
POIKILOCYTOSIS BLD QL AUTO: SLIGHT — SIGNIFICANT CHANGE UP
POLYCHROMASIA BLD QL SMEAR: SLIGHT — SIGNIFICANT CHANGE UP
POTASSIUM SERPL-MCNC: 3.4 MMOL/L — LOW (ref 3.5–5.3)
POTASSIUM SERPL-MCNC: 3.6 MMOL/L — SIGNIFICANT CHANGE UP (ref 3.5–5.3)
POTASSIUM SERPL-MCNC: 3.6 MMOL/L — SIGNIFICANT CHANGE UP (ref 3.5–5.3)
POTASSIUM SERPL-MCNC: 3.8 MMOL/L — SIGNIFICANT CHANGE UP (ref 3.5–5.3)
POTASSIUM SERPL-SCNC: 3.4 MMOL/L — LOW (ref 3.5–5.3)
POTASSIUM SERPL-SCNC: 3.6 MMOL/L — SIGNIFICANT CHANGE UP (ref 3.5–5.3)
POTASSIUM SERPL-SCNC: 3.6 MMOL/L — SIGNIFICANT CHANGE UP (ref 3.5–5.3)
POTASSIUM SERPL-SCNC: 3.8 MMOL/L — SIGNIFICANT CHANGE UP (ref 3.5–5.3)
PROT SERPL-MCNC: 5.8 G/DL — LOW (ref 6–8.3)
PROT SERPL-MCNC: 5.8 G/DL — LOW (ref 6–8.3)
PROT SERPL-MCNC: 5.9 G/DL — LOW (ref 6–8.3)
PROT SERPL-MCNC: 6.1 G/DL — SIGNIFICANT CHANGE UP (ref 6–8.3)
PROT UR-MCNC: 30 MG/DL
PROTHROM AB SERPL-ACNC: 11 SEC — SIGNIFICANT CHANGE UP (ref 9.5–13)
PROTHROM AB SERPL-ACNC: 11 SEC — SIGNIFICANT CHANGE UP (ref 9.5–13)
PROTHROM AB SERPL-ACNC: 11.2 SEC — SIGNIFICANT CHANGE UP (ref 9.5–13)
PROTHROM AB SERPL-ACNC: 11.3 SEC — SIGNIFICANT CHANGE UP (ref 9.5–13)
RBC # BLD: 2.97 M/UL — LOW (ref 3.8–5.2)
RBC # BLD: 3.17 M/UL — LOW (ref 3.8–5.2)
RBC # BLD: 3.17 M/UL — LOW (ref 3.8–5.2)
RBC # BLD: 3.5 M/UL — LOW (ref 3.8–5.2)
RBC # FLD: 15.2 % — HIGH (ref 10.3–14.5)
RBC BLD AUTO: ABNORMAL
RBC BLD AUTO: ABNORMAL
RBC CASTS # UR COMP ASSIST: 2 /HPF — SIGNIFICANT CHANGE UP (ref 0–4)
SAO2 % BLDA: 97.3 % — SIGNIFICANT CHANGE UP (ref 94–98)
SAO2 % BLDA: 97.6 % — SIGNIFICANT CHANGE UP (ref 94–98)
SAO2 % BLDA: 97.9 % — SIGNIFICANT CHANGE UP (ref 94–98)
SAO2 % BLDA: 98.1 % — HIGH (ref 94–98)
SAO2 % BLDA: 98.3 % — HIGH (ref 94–98)
SCHISTOCYTES BLD QL AUTO: SLIGHT — SIGNIFICANT CHANGE UP
SODIUM SERPL-SCNC: 140 MMOL/L — SIGNIFICANT CHANGE UP (ref 135–145)
SODIUM SERPL-SCNC: 140 MMOL/L — SIGNIFICANT CHANGE UP (ref 135–145)
SODIUM SERPL-SCNC: 141 MMOL/L — SIGNIFICANT CHANGE UP (ref 135–145)
SODIUM SERPL-SCNC: 142 MMOL/L — SIGNIFICANT CHANGE UP (ref 135–145)
SP GR SPEC: 1.01 — SIGNIFICANT CHANGE UP (ref 1–1.03)
SPECIMEN SOURCE: SIGNIFICANT CHANGE UP
SPECIMEN SOURCE: SIGNIFICANT CHANGE UP
SQUAMOUS # UR AUTO: 4 /HPF — SIGNIFICANT CHANGE UP (ref 0–5)
TRANS CELLS #/AREA URNS HPF: PRESENT
TROPONIN T, HIGH SENSITIVITY RESULT: <6 NG/L — SIGNIFICANT CHANGE UP (ref 0–51)
UROBILINOGEN FLD QL: 0.2 MG/DL — SIGNIFICANT CHANGE UP (ref 0.2–1)
WBC # BLD: 11.77 K/UL — HIGH (ref 3.8–10.5)
WBC # BLD: 14.79 K/UL — HIGH (ref 3.8–10.5)
WBC # BLD: 15.85 K/UL — HIGH (ref 3.8–10.5)
WBC # BLD: 17.42 K/UL — HIGH (ref 3.8–10.5)
WBC # FLD AUTO: 11.77 K/UL — HIGH (ref 3.8–10.5)
WBC # FLD AUTO: 14.79 K/UL — HIGH (ref 3.8–10.5)
WBC # FLD AUTO: 15.85 K/UL — HIGH (ref 3.8–10.5)
WBC # FLD AUTO: 17.42 K/UL — HIGH (ref 3.8–10.5)
WBC UR QL: 3 /HPF — SIGNIFICANT CHANGE UP (ref 0–5)

## 2024-05-24 PROCEDURE — 71045 X-RAY EXAM CHEST 1 VIEW: CPT | Mod: 26

## 2024-05-24 PROCEDURE — 99233 SBSQ HOSP IP/OBS HIGH 50: CPT | Mod: GC

## 2024-05-24 PROCEDURE — 93010 ELECTROCARDIOGRAM REPORT: CPT

## 2024-05-24 RX ORDER — POTASSIUM PHOSPHATE, MONOBASIC POTASSIUM PHOSPHATE, DIBASIC 236; 224 MG/ML; MG/ML
15 INJECTION, SOLUTION INTRAVENOUS ONCE
Refills: 0 | Status: COMPLETED | OUTPATIENT
Start: 2024-05-24 | End: 2024-05-24

## 2024-05-24 RX ORDER — POTASSIUM CHLORIDE 20 MEQ
40 PACKET (EA) ORAL ONCE
Refills: 0 | Status: COMPLETED | OUTPATIENT
Start: 2024-05-24 | End: 2024-05-24

## 2024-05-24 RX ORDER — POTASSIUM CHLORIDE 20 MEQ
40 PACKET (EA) ORAL
Refills: 0 | Status: COMPLETED | OUTPATIENT
Start: 2024-05-24 | End: 2024-05-24

## 2024-05-24 RX ORDER — VANCOMYCIN HCL 1 G
1000 VIAL (EA) INTRAVENOUS ONCE
Refills: 0 | Status: COMPLETED | OUTPATIENT
Start: 2024-05-24 | End: 2024-05-24

## 2024-05-24 RX ORDER — ALBUMIN HUMAN 25 %
100 VIAL (ML) INTRAVENOUS ONCE
Refills: 0 | Status: COMPLETED | OUTPATIENT
Start: 2024-05-24 | End: 2024-05-24

## 2024-05-24 RX ORDER — ACETAMINOPHEN 500 MG
1000 TABLET ORAL ONCE
Refills: 0 | Status: DISCONTINUED | OUTPATIENT
Start: 2024-05-24 | End: 2024-05-24

## 2024-05-24 RX ORDER — FUROSEMIDE 40 MG
60 TABLET ORAL ONCE
Refills: 0 | Status: COMPLETED | OUTPATIENT
Start: 2024-05-24 | End: 2024-05-24

## 2024-05-24 RX ORDER — MAGNESIUM SULFATE 500 MG/ML
2 VIAL (ML) INJECTION ONCE
Refills: 0 | Status: COMPLETED | OUTPATIENT
Start: 2024-05-24 | End: 2024-05-24

## 2024-05-24 RX ORDER — POTASSIUM CHLORIDE 20 MEQ
40 PACKET (EA) ORAL ONCE
Refills: 0 | Status: DISCONTINUED | OUTPATIENT
Start: 2024-05-24 | End: 2024-05-24

## 2024-05-24 RX ADMIN — PIPERACILLIN AND TAZOBACTAM 25 GRAM(S): 4; .5 INJECTION, POWDER, LYOPHILIZED, FOR SOLUTION INTRAVENOUS at 05:14

## 2024-05-24 RX ADMIN — Medication 25 GRAM(S): at 00:52

## 2024-05-24 RX ADMIN — POTASSIUM PHOSPHATE, MONOBASIC POTASSIUM PHOSPHATE, DIBASIC 62.5 MILLIMOLE(S): 236; 224 INJECTION, SOLUTION INTRAVENOUS at 02:23

## 2024-05-24 RX ADMIN — Medication 40 MILLIEQUIVALENT(S): at 23:00

## 2024-05-24 RX ADMIN — Medication 4: at 11:05

## 2024-05-24 RX ADMIN — CHLORHEXIDINE GLUCONATE 15 MILLILITER(S): 213 SOLUTION TOPICAL at 17:04

## 2024-05-24 RX ADMIN — Medication 40 MILLIEQUIVALENT(S): at 07:31

## 2024-05-24 RX ADMIN — Medication 40 MILLIEQUIVALENT(S): at 03:08

## 2024-05-24 RX ADMIN — FLUCONAZOLE 100 MILLIGRAM(S): 150 TABLET ORAL at 21:22

## 2024-05-24 RX ADMIN — Medication 1 DROP(S): at 05:15

## 2024-05-24 RX ADMIN — PIPERACILLIN AND TAZOBACTAM 25 GRAM(S): 4; .5 INJECTION, POWDER, LYOPHILIZED, FOR SOLUTION INTRAVENOUS at 21:22

## 2024-05-24 RX ADMIN — Medication 1 DROP(S): at 12:25

## 2024-05-24 RX ADMIN — Medication 3 MILLILITER(S): at 03:55

## 2024-05-24 RX ADMIN — Medication 3 MILLILITER(S): at 16:19

## 2024-05-24 RX ADMIN — Medication 250 MILLIGRAM(S): at 11:07

## 2024-05-24 RX ADMIN — Medication 1 DROP(S): at 00:08

## 2024-05-24 RX ADMIN — Medication 100 MILLILITER(S): at 13:32

## 2024-05-24 RX ADMIN — Medication 60 MILLIGRAM(S): at 02:27

## 2024-05-24 RX ADMIN — Medication 1 DROP(S): at 21:33

## 2024-05-24 RX ADMIN — Medication 1 DROP(S): at 14:52

## 2024-05-24 RX ADMIN — Medication 50 MILLIGRAM(S): at 12:24

## 2024-05-24 RX ADMIN — Medication 3 MILLILITER(S): at 21:51

## 2024-05-24 RX ADMIN — Medication 25 MICROGRAM(S)/HR: at 09:45

## 2024-05-24 RX ADMIN — Medication 6: at 23:36

## 2024-05-24 RX ADMIN — POTASSIUM PHOSPHATE, MONOBASIC POTASSIUM PHOSPHATE, DIBASIC 62.5 MILLIMOLE(S): 236; 224 INJECTION, SOLUTION INTRAVENOUS at 17:11

## 2024-05-24 RX ADMIN — CHLORHEXIDINE GLUCONATE 1 APPLICATION(S): 213 SOLUTION TOPICAL at 05:16

## 2024-05-24 RX ADMIN — Medication 1 DROP(S): at 22:06

## 2024-05-24 RX ADMIN — Medication 50 MILLILITER(S): at 02:44

## 2024-05-24 RX ADMIN — PIPERACILLIN AND TAZOBACTAM 25 GRAM(S): 4; .5 INJECTION, POWDER, LYOPHILIZED, FOR SOLUTION INTRAVENOUS at 14:52

## 2024-05-24 RX ADMIN — CHLORHEXIDINE GLUCONATE 15 MILLILITER(S): 213 SOLUTION TOPICAL at 05:14

## 2024-05-24 RX ADMIN — Medication 40 MILLIEQUIVALENT(S): at 21:22

## 2024-05-24 RX ADMIN — Medication 3 MILLILITER(S): at 10:42

## 2024-05-24 RX ADMIN — Medication 1 DROP(S): at 19:13

## 2024-05-24 RX ADMIN — Medication 6: at 18:33

## 2024-05-24 RX ADMIN — Medication 1 DROP(S): at 16:02

## 2024-05-24 RX ADMIN — PANTOPRAZOLE SODIUM 40 MILLIGRAM(S): 20 TABLET, DELAYED RELEASE ORAL at 11:07

## 2024-05-24 RX ADMIN — Medication 1 DROP(S): at 02:18

## 2024-05-24 RX ADMIN — Medication 4: at 06:38

## 2024-05-24 RX ADMIN — Medication 1 DROP(S): at 10:09

## 2024-05-24 RX ADMIN — Medication 1 DROP(S): at 08:14

## 2024-05-24 RX ADMIN — Medication 1 DROP(S): at 04:07

## 2024-05-24 RX ADMIN — Medication 60 MILLIGRAM(S): at 11:08

## 2024-05-24 RX ADMIN — ENOXAPARIN SODIUM 40 MILLIGRAM(S): 100 INJECTION SUBCUTANEOUS at 10:09

## 2024-05-24 NOTE — PROGRESS NOTE ADULT - SUBJECTIVE AND OBJECTIVE BOX
INTERVAL HPI/OVERNIGHT EVENTS: diflucan started based on cultures liveon obtained, MN repetion: 2 mag, 15 K phos, then 40 KCl, gave 60 lasix and 100 ml albumin 25% per liveon recs, DCed FW flushes, 40 KCl    SUBJECTIVE: Patient seen and examined at bedside. Comatose.    VITAL SIGNS:  ICU Vital Signs Last 24 Hrs  T(C): 37.9 (24 May 2024 14:41), Max: 37.9 (24 May 2024 14:41)  T(F): 100.2 (24 May 2024 14:41), Max: 100.2 (24 May 2024 14:41)  HR: 106 (24 May 2024 15:00) (90 - 119)  BP: --  BP(mean): --  ABP: 143/81 (24 May 2024 15:00) (123/69 - 210/121)  ABP(mean): 108 (24 May 2024 15:00) (90 - 162)  RR: 15 (24 May 2024 15:00) (15 - 16)  SpO2: 98% (24 May 2024 15:00) (94% - 100%)    O2 Parameters below as of 24 May 2024 15:00  Patient On (Oxygen Delivery Method): ventilator    O2 Concentration (%): 40    Mode: AC/ CMV (Assist Control/ Continuous Mandatory Ventilation), RR (machine): 15, FiO2: 40, PEEP: 10, ITime: 2, MAP: 18, PC: 26, PIP: 27     @ 07: @ 07:00  --------------------------------------------------------  IN: 5778.8 mL / OUT: 4826 mL / NET: 952.8 mL     @ 07:01  -   @ 15:37  --------------------------------------------------------  IN: 683.4 mL / OUT: 2232 mL / NET: -1548.6 mL    CAPILLARY BLOOD GLUCOSE    POCT Blood Glucose.: 202 mg/dL (24 May 2024 10:05)    PHYSICAL EXAM:  Constitutional: comatose  HEENT: no conjunctival injection  Neck: supple, no JVD  Cardiovascular: rrr  Respiratory: CTA B/L  Gastrointestinal: abdomen distended  Extremities: WWP no edema  Vascular: 2+ radial, DP/PT and femoral pulses B/L  Dermatologic: no wounds    MEDICATIONS:  MEDICATIONS  (STANDING):  albuterol/ipratropium for Nebulization 3 milliLiter(s) Nebulizer every 6 hours  artificial  tears Solution 1 Drop(s) Both EYES every 2 hours  chlorhexidine 0.12% Liquid 15 milliLiter(s) Oral Mucosa every 12 hours  chlorhexidine 2% Cloths 1 Application(s) Topical <User Schedule>  dextrose 10% Bolus 125 milliLiter(s) IV Bolus once  dextrose 5%. 1000 milliLiter(s) (50 mL/Hr) IV Continuous <Continuous>  dextrose 5%. 1000 milliLiter(s) (100 mL/Hr) IV Continuous <Continuous>  dextrose 50% Injectable 12.5 Gram(s) IV Push once  dextrose 50% Injectable 25 Gram(s) IV Push once  enoxaparin Injectable 40 milliGRAM(s) SubCutaneous every 24 hours  fluconAZOLE IVPB 400 milliGRAM(s) IV Intermittent every 24 hours  glucagon  Injectable 1 milliGRAM(s) IntraMuscular once  insulin lispro (ADMELOG) corrective regimen sliding scale   SubCutaneous every 6 hours  levothyroxine Infusion 10 MICROgram(s)/Hr (25 mL/Hr) IV Continuous <Continuous>  niCARdipine Infusion 3 mG/Hr (15 mL/Hr) IV Continuous <Continuous>  pantoprazole  Injectable 40 milliGRAM(s) IV Push daily  piperacillin/tazobactam IVPB.. 3.375 Gram(s) IV Intermittent every 8 hours  potassium phosphate IVPB 15 milliMole(s) IV Intermittent once  rifAXIMin 550 milliGRAM(s) Oral two times a day  vasopressin Infusion. 0.5 Unit(s)/Hr (1.25 mL/Hr) IV Continuous <Continuous>    MEDICATIONS  (PRN):  acetylcysteine 10%  Inhalation 4 milliLiter(s) Inhalation every 4 hours PRN THICK SECRETIONS  dextrose Oral Gel 15 Gram(s) Oral once PRN Blood Glucose LESS THAN 70 milliGRAM(s)/deciliter  fentaNYL    Injectable 50 MICROGram(s) IV Push every 2 hours PRN severe pain/vent dyssynchrony  sodium chloride 0.9% lock flush 10 milliLiter(s) IV Push every 1 hour PRN Pre/post blood products, medications, blood draw, and to maintain line patency    LABS:                        8.4    14.79 )-----------( 211      ( 24 May 2024 11:30 )             26.1     05-24    140  |  106  |  17  ----------------------------<  230<H>  3.6   |  24  |  0.67    Ca    8.4      24 May 2024 11:30  Phos  1.8     -  Mg     2.0     -    TPro  6.1  /  Alb  3.3  /  TBili  0.2  /  DBili  <0.2  /  AST  82<H>  /  ALT  128<H>  /  AlkPhos  79  05-24    PT/INR - ( 24 May 2024 11:30 )   PT: 11.0 sec;   INR: 0.96       PTT - ( 24 May 2024 11:30 )  PTT:26.2 sec  Urinalysis Basic - ( 24 May 2024 13:23 )    Color: Yellow / Appearance: Cloudy / S.015 / pH: x  Gluc: x / Ketone: Negative mg/dL  / Bili: Negative / Urobili: 0.2 mg/dL   Blood: x / Protein: 30 mg/dL / Nitrite: Negative   Leuk Esterase: Negative / RBC: 2 /HPF / WBC 3 /HPF   Sq Epi: x / Non Sq Epi: 4 /HPF / Bacteria: Negative /HPF    RADIOLOGY & ADDITIONAL TESTS: Reviewed.

## 2024-05-24 NOTE — PROGRESS NOTE ADULT - ASSESSMENT
Assessment: 44 pmhx of bipolar (on lithium), htn (hctz), HLD, RA, GERD, hemorrhoids, MARCELL, lupus and fibromyalgia found to be in cardiac arrest possible 2/2 drug overdose (cocaine w/ possibly laced with fentanyl) admitted to the micu for post resuscitation care.    Neuro  #Brain death  5/22 performed 72 hours post cardiac resuscitation. Patient without confounding conditions  Coma confirmed without sign of arousal, or response to noxious stimulus (nailbed). Brainstem areflexia confirmed with absence of pupilar light reflex, vestibuloocular reflex, corneal reflex, gag reflex, cough reflex. Positive apnea testing performed w/ preoxygenation with 100% fio2, pCO2 greater that 60. No further ancilllary brain death testing.  - family requesting organ donation    #Anoxic brain injury 2/2 to cardiac arrest  UTox positive for cocaine.  CT Head - effacement of the cerebral sulci, ventricles and cisternal spaces secondary to cerebral edema. Findings consistent with anoxic brain injury.  - targeted temperature management, maintain normothermia (32 - 37.5C)    #Anxiety / Depression  #Bipolar 1 Diagnosis  On lithium 450mg in am and 450mg qpm. lithium level low at 0.26. On hctz. Lithium levels should be elevated iso of hctz use, however level indicates underdosing. Unknown last change in regimen. Last refilled 4/25/24. Psychiatrist at [facility].    #Intubated  - off sedation    Pulm  #Intubation  On ventilator PC AC  ABG with elevation in CO2 5/21. Bronchoscopy 5/23    Cards  #Cardiac Arrest  ACLS x2. ROSC obtained.  UTox positive for cocaine; however no signs of vasospasm > acute coronary syndrome with negative trops and non-ischemic EKG. High suspicion of fentanyl overdose. Right heart and left heart cath 5/23  - Pacer pads in place     #HTN  home medications Losartan 100mg, HCTZ 25mg, Norvasc 6.25 BID.   - iv hydral 5 prn      GI  #Transaminitis  Likely shock liver i/s/o cardiac arrest. Distension on physical exam  AST/ALT ratio 1:1; serum alcohol negative. unlikely alcohol induced liver injury  CTAP: 5cm hypodense finding of liver representing irregular focal fatty infiltration vs hepatic laceration  Maps decreasing 5/22 likely 2/2 increased UOP, less likely 2/2 bleed  - ctm with abdominal exams    #Ileus  likely 2/2 to fentanyl overdose. Found with drug paraphernalia nearby.  CT Abdomen -colon dilated to 8 cm with gas and semi solid/liquid stool. Stomach distended with gas and fluid.   - OG decompression  - rectal tube    #Chronic constipation  Home medications: Dulcolax and Miralax qd for constipation    #Chronic GERD  Home medication: omeprazole 40 qd    Renal  cristóbal    Rheum  #RA  Managed on Humira 40mg, MTX 2.5mg, and Naproxen PRN.    - hold Humira, MTX, leukovorin, and Naproxen    Endo  CRISTÓBAL    ID  #C/f staph epidermidis bacteremia  Blood culture revealing staph epidermidis, D/yordan vanc  - on zosyn  - f/u surveillance blood culture    Heme  #Chronic MARCELL  Low HGB as of 08/2023, per outside records  Home medication: Ferrous Sulfate    PPX  E: Replete  N: NPO  GI: ppi qD  DVT ppx: lovenox  Dispo: MICU     FULL CODE Assessment: 44 pmhx of bipolar (on lithium), htn (hctz), HLD, RA, GERD, hemorrhoids, MARCELL, lupus and fibromyalgia found to be in cardiac arrest possible 2/2 drug overdose (cocaine w/ possibly laced with fentanyl) admitted to the micu for post resuscitation care.    Neuro  #Brain death  5/22 performed 72 hours post cardiac resuscitation. Patient without confounding conditions  Coma confirmed without sign of arousal, or response to noxious stimulus (nailbed). Brainstem areflexia confirmed with absence of pupilar light reflex, vestibuloocular reflex, corneal reflex, gag reflex, cough reflex. Positive apnea testing performed w/ preoxygenation with 100% fio2, pCO2 greater that 60. No further ancilllary brain death testing.  - family requesting organ donation  - plan by live on protocol    #Anoxic brain injury 2/2 to cardiac arrest  UTox positive for cocaine.  CT Head - effacement of the cerebral sulci, ventricles and cisternal spaces secondary to cerebral edema. Findings consistent with anoxic brain injury.  - targeted temperature management, maintain normothermia (32 - 37.5C)    #Intubated  - off sedation    Pulm  #Intubation  On ventilator PC AC  Bronchoscopy 5/23. Prone    Cards  #Cardiac Arrest  ACLS x2. ROSC obtained.  UTox positive for cocaine; however no signs of vasospasm > acute coronary syndrome with negative trops and non-ischemic EKG. High suspicion of fentanyl overdose. Right heart and left heart cath 5/23  - Pacer pads in place     #HTN  home medications Losartan 100mg, HCTZ 25mg, Norvasc 6.25 BID.   - iv hydral 5 prn    GI  #Ileus  likely 2/2 to fentanyl overdose. Found with drug paraphernalia nearby.  CT Abdomen -colon dilated to 8 cm with gas and semi solid/liquid stool. Stomach distended with gas and fluid.   - OG decompression  - rectal tube    Renal  cristóbal    Endo  CRISTÓBAL    ID  #Pneumonia  #C/f staph epidermidis bacteremia  Blood culture revealing staph epidermidis, D/yordan vanc  - on zosyn  - f/u surveillance blood culture, ngtd  - started on fluconazole    Heme  CRISTÓBAL    PPX  E: Replete  N: NPO  GI: ppi qD  DVT ppx: lovenox  Dispo: MICU     FULL CODE Assessment: 44 pmhx of bipolar (on lithium), htn (hctz), HLD, RA, GERD, hemorrhoids, MARCELL, lupus and fibromyalgia found to be in cardiac arrest possible 2/2 drug overdose (cocaine w/ possibly laced with fentanyl) admitted to the micu for post resuscitation care.    Neuro  #Brain death  5/22 performed 72 hours post cardiac resuscitation. Patient without confounding conditions  Coma confirmed without sign of arousal, or response to noxious stimulus (nailbed). Brainstem areflexia confirmed with absence of pupilar light reflex, vestibuloocular reflex, corneal reflex, gag reflex, cough reflex. Positive apnea testing performed w/ preoxygenation with 100% fio2, pCO2 greater that 60. No further ancilllary brain death testing.  - family requesting organ donation  - plan by live on protocol    #Anoxic brain injury 2/2 to cardiac arrest  UTox positive for cocaine.  CT Head - effacement of the cerebral sulci, ventricles and cisternal spaces secondary to cerebral edema. Findings consistent with anoxic brain injury.  - targeted temperature management, maintain normothermia (32 - 37.5C)    #Intubated  - off sedation    Pulm  #Intubation  On ventilator PC AC  Bronchoscopy 5/23. Prone    Cards  #Cardiac Arrest  ACLS x2. ROSC obtained.  UTox positive for cocaine; however no signs of vasospasm > acute coronary syndrome with negative trops and non-ischemic EKG. High suspicion of fentanyl overdose. Right heart and left heart cath 5/23    #HTN  home medications Losartan 100mg, HCTZ 25mg, Norvasc 6.25 BID.   - iv hydral 5 prn    GI  #Ileus  likely 2/2 to fentanyl overdose. Found with drug paraphernalia nearby.  CT Abdomen -colon dilated to 8 cm with gas and semi solid/liquid stool. Stomach distended with gas and fluid.   - OG decompression  - rectal tube    Renal  cristóbal    Endo  CRISTÓBAL    ID  #Pneumonia  #C/f staph epidermidis bacteremia  Blood culture revealing staph epidermidis  - on zosyn  - f/u surveillance blood culture, ngtd  - started on fluconazole    Heme  CRISTÓBAL    PPX  E: Replete  N: NPO  GI: ppi qD  DVT ppx: lovenox  Dispo: MICU     FULL CODE Assessment: 44 pmhx of bipolar (on lithium), htn (hctz), HLD, RA, GERD, hemorrhoids, MARCELL, lupus and fibromyalgia found to be in cardiac arrest possible 2/2 drug overdose (cocaine w/ possibly laced with fentanyl) admitted to the micu for post resuscitation care.    Neuro  #Brain death  5/22 performed 72 hours post cardiac resuscitation. Patient without confounding conditions  Coma confirmed without sign of arousal, or response to noxious stimulus (nailbed). Brainstem areflexia confirmed with absence of pupilar light reflex, vestibuloocular reflex, corneal reflex, gag reflex, cough reflex. Positive apnea testing performed w/ preoxygenation with 100% fio2, pCO2 greater that 60. No further ancilllary brain death testing.  - family requesting organ donation  - plan by live on protocol    #Anoxic brain injury 2/2 to cardiac arrest  UTox positive for cocaine.  CT Head - effacement of the cerebral sulci, ventricles and cisternal spaces secondary to cerebral edema. Findings consistent with anoxic brain injury.  - targeted temperature management, maintain normothermia (32 - 37.5C)    #Intubated  - off sedation    Pulm  #Intubation  On ventilator PC AC  Bronchoscopy 5/23. Prone    Cards  #Cardiac Arrest  ACLS x2. ROSC obtained.  UTox positive for cocaine; however no signs of vasospasm > acute coronary syndrome with negative trops and non-ischemic EKG. High suspicion of fentanyl overdose. Right heart and left heart cath 5/23    #HTN  home medications Losartan 100mg, HCTZ 25mg, Norvasc 6.25 BID.   - iv hydral 5 prn    GI  #Ileus  likely 2/2 to fentanyl overdose. Found with drug paraphernalia nearby.  CT Abdomen -colon dilated to 8 cm with gas and semi solid/liquid stool. Stomach distended with gas and fluid.   - OG decompression  - rectal tube    Renal  cristóbal    Endo  CRISTÓBAL    ID  #C/f staph epidermidis bacteremia  Blood culture revealing staph epidermidis  - on zosyn  - f/u surveillance blood culture, ngtd  - started on fluconazole    Heme  CRISTÓBAL    PPX  E: Replete  N: NPO  GI: ppi qD  DVT ppx: lovenox  Dispo: MICU     FULL CODE

## 2024-05-24 NOTE — CHART NOTE - NSCHARTNOTEFT_GEN_A_CORE
Patient placed in supine position at 1650. Endotracheal tube at 23cm before and after repositioning. ABG to be obtained 30 minutes after supination.
Patient placed in prone position at 1055. Bony prominences padded prior to repositioning. Endotracheal tube at 23cm before and after repositioning. ABG to be obtained at 1130.

## 2024-05-25 LAB
ALBUMIN SERPL ELPH-MCNC: 3.2 G/DL — LOW (ref 3.3–5)
ALBUMIN SERPL ELPH-MCNC: 3.4 G/DL — SIGNIFICANT CHANGE UP (ref 3.3–5)
ALBUMIN SERPL ELPH-MCNC: 3.5 G/DL — SIGNIFICANT CHANGE UP (ref 3.3–5)
ALBUMIN SERPL ELPH-MCNC: 3.5 G/DL — SIGNIFICANT CHANGE UP (ref 3.3–5)
ALFENTANIL UR QUANT: SIGNIFICANT CHANGE UP NG/MG CREAT
ALP SERPL-CCNC: 75 U/L — SIGNIFICANT CHANGE UP (ref 40–120)
ALP SERPL-CCNC: 77 U/L — SIGNIFICANT CHANGE UP (ref 40–120)
ALP SERPL-CCNC: 77 U/L — SIGNIFICANT CHANGE UP (ref 40–120)
ALP SERPL-CCNC: 84 U/L — SIGNIFICANT CHANGE UP (ref 40–120)
ALT FLD-CCNC: 102 U/L — HIGH (ref 10–45)
ALT FLD-CCNC: 103 U/L — HIGH (ref 10–45)
ALT FLD-CCNC: 84 U/L — HIGH (ref 10–45)
ALT FLD-CCNC: 88 U/L — HIGH (ref 10–45)
AMYLASE P1 CFR SERPL: 123 U/L — SIGNIFICANT CHANGE UP (ref 25–125)
AMYLASE P1 CFR SERPL: 127 U/L — HIGH (ref 25–125)
AMYLASE P1 CFR SERPL: 135 U/L — HIGH (ref 25–125)
AMYLASE P1 CFR SERPL: 159 U/L — HIGH (ref 25–125)
ANION GAP SERPL CALC-SCNC: 11 MMOL/L — SIGNIFICANT CHANGE UP (ref 5–17)
ANION GAP SERPL CALC-SCNC: 11 MMOL/L — SIGNIFICANT CHANGE UP (ref 5–17)
ANION GAP SERPL CALC-SCNC: 12 MMOL/L — SIGNIFICANT CHANGE UP (ref 5–17)
ANION GAP SERPL CALC-SCNC: 9 MMOL/L — SIGNIFICANT CHANGE UP (ref 5–17)
ANISOCYTOSIS BLD QL: SIGNIFICANT CHANGE UP
ANISOCYTOSIS BLD QL: SLIGHT — SIGNIFICANT CHANGE UP
APPEARANCE UR: CLEAR — SIGNIFICANT CHANGE UP
APTT BLD: 25 SEC — SIGNIFICANT CHANGE UP (ref 24.5–35.6)
APTT BLD: 25.1 SEC — SIGNIFICANT CHANGE UP (ref 24.5–35.6)
APTT BLD: 25.1 SEC — SIGNIFICANT CHANGE UP (ref 24.5–35.6)
APTT BLD: 25.2 SEC — SIGNIFICANT CHANGE UP (ref 24.5–35.6)
AST SERPL-CCNC: 35 U/L — SIGNIFICANT CHANGE UP (ref 10–40)
AST SERPL-CCNC: 36 U/L — SIGNIFICANT CHANGE UP (ref 10–40)
AST SERPL-CCNC: 47 U/L — HIGH (ref 10–40)
AST SERPL-CCNC: 54 U/L — HIGH (ref 10–40)
BACTERIA # UR AUTO: NEGATIVE /HPF — SIGNIFICANT CHANGE UP
BASE EXCESS BLDA CALC-SCNC: -0.2 MMOL/L — SIGNIFICANT CHANGE UP (ref -2–3)
BASE EXCESS BLDA CALC-SCNC: 2.4 MMOL/L — SIGNIFICANT CHANGE UP (ref -2–3)
BASE EXCESS BLDA CALC-SCNC: 2.4 MMOL/L — SIGNIFICANT CHANGE UP (ref -2–3)
BASE EXCESS BLDA CALC-SCNC: 2.5 MMOL/L — SIGNIFICANT CHANGE UP (ref -2–3)
BASE EXCESS BLDA CALC-SCNC: 3.1 MMOL/L — HIGH (ref -2–3)
BASOPHILS # BLD AUTO: 0 K/UL — SIGNIFICANT CHANGE UP (ref 0–0.2)
BASOPHILS # BLD AUTO: 0 K/UL — SIGNIFICANT CHANGE UP (ref 0–0.2)
BASOPHILS # BLD AUTO: 0.01 K/UL — SIGNIFICANT CHANGE UP (ref 0–0.2)
BASOPHILS # BLD AUTO: 0.02 K/UL — SIGNIFICANT CHANGE UP (ref 0–0.2)
BASOPHILS NFR BLD AUTO: 0 % — SIGNIFICANT CHANGE UP (ref 0–2)
BASOPHILS NFR BLD AUTO: 0 % — SIGNIFICANT CHANGE UP (ref 0–2)
BASOPHILS NFR BLD AUTO: 0.1 % — SIGNIFICANT CHANGE UP (ref 0–2)
BASOPHILS NFR BLD AUTO: 0.2 % — SIGNIFICANT CHANGE UP (ref 0–2)
BILIRUB DIRECT SERPL-MCNC: <0.2 MG/DL — SIGNIFICANT CHANGE UP (ref 0–0.3)
BILIRUB INDIRECT FLD-MCNC: SIGNIFICANT CHANGE UP (ref 0.2–1)
BILIRUB INDIRECT FLD-MCNC: SIGNIFICANT CHANGE UP MG/DL (ref 0.2–1)
BILIRUB INDIRECT FLD-MCNC: SIGNIFICANT CHANGE UP MG/DL (ref 0.2–1)
BILIRUB SERPL-MCNC: 0.2 MG/DL — SIGNIFICANT CHANGE UP (ref 0.2–1.2)
BILIRUB SERPL-MCNC: 0.3 MG/DL — SIGNIFICANT CHANGE UP (ref 0.2–1.2)
BILIRUB UR-MCNC: NEGATIVE — SIGNIFICANT CHANGE UP
BUN SERPL-MCNC: 20 MG/DL — SIGNIFICANT CHANGE UP (ref 7–23)
BUN SERPL-MCNC: 20 MG/DL — SIGNIFICANT CHANGE UP (ref 7–23)
BUN SERPL-MCNC: 25 MG/DL — HIGH (ref 7–23)
BUN SERPL-MCNC: 25 MG/DL — HIGH (ref 7–23)
CALCIUM SERPL-MCNC: 8.4 MG/DL — SIGNIFICANT CHANGE UP (ref 8.4–10.5)
CALCIUM SERPL-MCNC: 8.7 MG/DL — SIGNIFICANT CHANGE UP (ref 8.4–10.5)
CALCIUM SERPL-MCNC: 8.7 MG/DL — SIGNIFICANT CHANGE UP (ref 8.4–10.5)
CALCIUM SERPL-MCNC: 8.8 MG/DL — SIGNIFICANT CHANGE UP (ref 8.4–10.5)
CAST: 1 /LPF — SIGNIFICANT CHANGE UP (ref 0–4)
CHLORIDE SERPL-SCNC: 105 MMOL/L — SIGNIFICANT CHANGE UP (ref 96–108)
CHLORIDE SERPL-SCNC: 107 MMOL/L — SIGNIFICANT CHANGE UP (ref 96–108)
CHLORIDE SERPL-SCNC: 108 MMOL/L — SIGNIFICANT CHANGE UP (ref 96–108)
CHLORIDE SERPL-SCNC: 109 MMOL/L — HIGH (ref 96–108)
CK MB CFR SERPL CALC: 1.1 NG/ML — SIGNIFICANT CHANGE UP (ref 0–6.7)
CK MB CFR SERPL CALC: 1.4 NG/ML — SIGNIFICANT CHANGE UP (ref 0–6.7)
CK MB CFR SERPL CALC: 2.4 NG/ML — SIGNIFICANT CHANGE UP (ref 0–6.7)
CK SERPL-CCNC: 188 U/L — HIGH (ref 25–170)
CK SERPL-CCNC: 252 U/L — HIGH (ref 25–170)
CK SERPL-CCNC: 443 U/L — HIGH (ref 25–170)
CO2 BLDA-SCNC: 25 MMOL/L — HIGH (ref 19–24)
CO2 BLDA-SCNC: 27 MMOL/L — HIGH (ref 19–24)
CO2 BLDA-SCNC: 27 MMOL/L — HIGH (ref 19–24)
CO2 BLDA-SCNC: 28 MMOL/L — HIGH (ref 19–24)
CO2 BLDA-SCNC: 28 MMOL/L — HIGH (ref 19–24)
CO2 SERPL-SCNC: 23 MMOL/L — SIGNIFICANT CHANGE UP (ref 22–31)
CO2 SERPL-SCNC: 25 MMOL/L — SIGNIFICANT CHANGE UP (ref 22–31)
CO2 SERPL-SCNC: 25 MMOL/L — SIGNIFICANT CHANGE UP (ref 22–31)
CO2 SERPL-SCNC: 26 MMOL/L — SIGNIFICANT CHANGE UP (ref 22–31)
COD CRY URNS QL: PRESENT
COLOR SPEC: YELLOW — SIGNIFICANT CHANGE UP
CREAT SERPL-MCNC: 0.58 MG/DL — SIGNIFICANT CHANGE UP (ref 0.5–1.3)
CREAT SERPL-MCNC: 0.61 MG/DL — SIGNIFICANT CHANGE UP (ref 0.5–1.3)
CREAT UR-MCNC: 9 MG/DL — LOW
CULTURE RESULTS: SIGNIFICANT CHANGE UP
DIFF PNL FLD: NEGATIVE — SIGNIFICANT CHANGE UP
EGFR: 112 ML/MIN/1.73M2 — SIGNIFICANT CHANGE UP
EGFR: 113 ML/MIN/1.73M2 — SIGNIFICANT CHANGE UP
EOSINOPHIL # BLD AUTO: 0 K/UL — SIGNIFICANT CHANGE UP (ref 0–0.5)
EOSINOPHIL NFR BLD AUTO: 0 % — SIGNIFICANT CHANGE UP (ref 0–6)
FENTANYL UR CFM-MCNC: 133 NG/MG CREAT — SIGNIFICANT CHANGE UP
FENTANYL UR CFM-MCNC: ABNORMAL
FIBRINOGEN PPP-MCNC: 542 MG/DL — HIGH (ref 200–445)
FIBRINOGEN PPP-MCNC: 607 MG/DL — HIGH (ref 200–445)
FIBRINOGEN PPP-MCNC: 642 MG/DL — HIGH (ref 200–445)
GAS PNL BLDA: SIGNIFICANT CHANGE UP
GLUCOSE BLDC GLUCOMTR-MCNC: 178 MG/DL — HIGH (ref 70–99)
GLUCOSE BLDC GLUCOMTR-MCNC: 209 MG/DL — HIGH (ref 70–99)
GLUCOSE BLDC GLUCOMTR-MCNC: 264 MG/DL — HIGH (ref 70–99)
GLUCOSE SERPL-MCNC: 169 MG/DL — HIGH (ref 70–99)
GLUCOSE SERPL-MCNC: 216 MG/DL — HIGH (ref 70–99)
GLUCOSE SERPL-MCNC: 263 MG/DL — HIGH (ref 70–99)
GLUCOSE SERPL-MCNC: 265 MG/DL — HIGH (ref 70–99)
GLUCOSE UR QL: NEGATIVE MG/DL — SIGNIFICANT CHANGE UP
HCO3 BLDA-SCNC: 24 MMOL/L — SIGNIFICANT CHANGE UP (ref 21–28)
HCO3 BLDA-SCNC: 25 MMOL/L — SIGNIFICANT CHANGE UP (ref 21–28)
HCO3 BLDA-SCNC: 26 MMOL/L — SIGNIFICANT CHANGE UP (ref 21–28)
HCO3 BLDA-SCNC: 26 MMOL/L — SIGNIFICANT CHANGE UP (ref 21–28)
HCO3 BLDA-SCNC: 27 MMOL/L — SIGNIFICANT CHANGE UP (ref 21–28)
HCT VFR BLD CALC: 21.5 % — LOW (ref 34.5–45)
HCT VFR BLD CALC: 22 % — LOW (ref 34.5–45)
HCT VFR BLD CALC: 23.3 % — LOW (ref 34.5–45)
HCT VFR BLD CALC: 23.5 % — LOW (ref 34.5–45)
HGB BLD-MCNC: 6.9 G/DL — CRITICAL LOW (ref 11.5–15.5)
HGB BLD-MCNC: 7.2 G/DL — LOW (ref 11.5–15.5)
HGB BLD-MCNC: 7.5 G/DL — LOW (ref 11.5–15.5)
HGB BLD-MCNC: 7.6 G/DL — LOW (ref 11.5–15.5)
HYPOCHROMIA BLD QL: SIGNIFICANT CHANGE UP
HYPOCHROMIA BLD QL: SLIGHT — SIGNIFICANT CHANGE UP
IMM GRANULOCYTES NFR BLD AUTO: 1.8 % — HIGH (ref 0–0.9)
IMM GRANULOCYTES NFR BLD AUTO: 3.5 % — HIGH (ref 0–0.9)
INR BLD: 0.94 — SIGNIFICANT CHANGE UP (ref 0.85–1.18)
INR BLD: 0.97 — SIGNIFICANT CHANGE UP (ref 0.85–1.18)
INR BLD: 0.99 — SIGNIFICANT CHANGE UP (ref 0.85–1.18)
INR BLD: 0.99 — SIGNIFICANT CHANGE UP (ref 0.85–1.18)
KETONES UR-MCNC: NEGATIVE MG/DL — SIGNIFICANT CHANGE UP
LDH SERPL L TO P-CCNC: 552 U/L — HIGH (ref 50–242)
LDH SERPL L TO P-CCNC: 589 U/L — HIGH (ref 50–242)
LDH SERPL L TO P-CCNC: 591 U/L — HIGH (ref 50–242)
LEUKOCYTE ESTERASE UR-ACNC: NEGATIVE — SIGNIFICANT CHANGE UP
LIDOCAIN IGE QN: 135 U/L — HIGH (ref 7–60)
LIDOCAIN IGE QN: 145 U/L — HIGH (ref 7–60)
LIDOCAIN IGE QN: 154 U/L — HIGH (ref 7–60)
LIDOCAIN IGE QN: 160 U/L — HIGH (ref 7–60)
LYMPHOCYTES # BLD AUTO: 0.43 K/UL — LOW (ref 1–3.3)
LYMPHOCYTES # BLD AUTO: 0.59 K/UL — LOW (ref 1–3.3)
LYMPHOCYTES # BLD AUTO: 0.71 K/UL — LOW (ref 1–3.3)
LYMPHOCYTES # BLD AUTO: 0.81 K/UL — LOW (ref 1–3.3)
LYMPHOCYTES # BLD AUTO: 3.7 % — LOW (ref 13–44)
LYMPHOCYTES # BLD AUTO: 5.3 % — LOW (ref 13–44)
LYMPHOCYTES # BLD AUTO: 6.7 % — LOW (ref 13–44)
LYMPHOCYTES # BLD AUTO: 9.3 % — LOW (ref 13–44)
MAGNESIUM SERPL-MCNC: 2.1 MG/DL — SIGNIFICANT CHANGE UP (ref 1.6–2.6)
MAGNESIUM SERPL-MCNC: 2.1 MG/DL — SIGNIFICANT CHANGE UP (ref 1.6–2.6)
MAGNESIUM SERPL-MCNC: 2.2 MG/DL — SIGNIFICANT CHANGE UP (ref 1.6–2.6)
MAGNESIUM SERPL-MCNC: 2.2 MG/DL — SIGNIFICANT CHANGE UP (ref 1.6–2.6)
MANUAL SMEAR VERIFICATION: SIGNIFICANT CHANGE UP
MANUAL SMEAR VERIFICATION: SIGNIFICANT CHANGE UP
MCHC RBC-ENTMCNC: 26.3 PG — LOW (ref 27–34)
MCHC RBC-ENTMCNC: 26.3 PG — LOW (ref 27–34)
MCHC RBC-ENTMCNC: 26.5 PG — LOW (ref 27–34)
MCHC RBC-ENTMCNC: 26.7 PG — LOW (ref 27–34)
MCHC RBC-ENTMCNC: 32.1 GM/DL — SIGNIFICANT CHANGE UP (ref 32–36)
MCHC RBC-ENTMCNC: 32.2 GM/DL — SIGNIFICANT CHANGE UP (ref 32–36)
MCHC RBC-ENTMCNC: 32.3 GM/DL — SIGNIFICANT CHANGE UP (ref 32–36)
MCHC RBC-ENTMCNC: 32.7 GM/DL — SIGNIFICANT CHANGE UP (ref 32–36)
MCV RBC AUTO: 80.9 FL — SIGNIFICANT CHANGE UP (ref 80–100)
MCV RBC AUTO: 81.3 FL — SIGNIFICANT CHANGE UP (ref 80–100)
MCV RBC AUTO: 82.1 FL — SIGNIFICANT CHANGE UP (ref 80–100)
MCV RBC AUTO: 82.9 FL — SIGNIFICANT CHANGE UP (ref 80–100)
MICROCYTES BLD QL: SIGNIFICANT CHANGE UP
MICROCYTES BLD QL: SLIGHT — SIGNIFICANT CHANGE UP
MONOCYTES # BLD AUTO: 0.49 K/UL — SIGNIFICANT CHANGE UP (ref 0–0.9)
MONOCYTES # BLD AUTO: 0.61 K/UL — SIGNIFICANT CHANGE UP (ref 0–0.9)
MONOCYTES # BLD AUTO: 0.86 K/UL — SIGNIFICANT CHANGE UP (ref 0–0.9)
MONOCYTES # BLD AUTO: 1.16 K/UL — HIGH (ref 0–0.9)
MONOCYTES NFR BLD AUTO: 10.9 % — SIGNIFICANT CHANGE UP (ref 2–14)
MONOCYTES NFR BLD AUTO: 4.4 % — SIGNIFICANT CHANGE UP (ref 2–14)
MONOCYTES NFR BLD AUTO: 7 % — SIGNIFICANT CHANGE UP (ref 2–14)
MONOCYTES NFR BLD AUTO: 7.5 % — SIGNIFICANT CHANGE UP (ref 2–14)
NEUTROPHILS # BLD AUTO: 10.24 K/UL — HIGH (ref 1.8–7.4)
NEUTROPHILS # BLD AUTO: 7.07 K/UL — SIGNIFICANT CHANGE UP (ref 1.8–7.4)
NEUTROPHILS # BLD AUTO: 8.41 K/UL — HIGH (ref 1.8–7.4)
NEUTROPHILS # BLD AUTO: 9.93 K/UL — HIGH (ref 1.8–7.4)
NEUTROPHILS NFR BLD AUTO: 78.8 % — HIGH (ref 43–77)
NEUTROPHILS NFR BLD AUTO: 81.4 % — HIGH (ref 43–77)
NEUTROPHILS NFR BLD AUTO: 87.9 % — HIGH (ref 43–77)
NEUTROPHILS NFR BLD AUTO: 88.3 % — HIGH (ref 43–77)
NEUTS BAND # BLD: 0.9 % — SIGNIFICANT CHANGE UP (ref 0–8)
NITRITE UR-MCNC: NEGATIVE — SIGNIFICANT CHANGE UP
NORFENTANYL UR-MCNC: 300 NG/MG CREAT — SIGNIFICANT CHANGE UP
NRBC # BLD: 0 /100 WBCS — SIGNIFICANT CHANGE UP (ref 0–0)
NRBC # BLD: 0 /100 WBCS — SIGNIFICANT CHANGE UP (ref 0–0)
NRBC # BLD: 1 /100 WBCS — HIGH (ref 0–0)
NRBC # BLD: SIGNIFICANT CHANGE UP /100 WBCS (ref 0–0)
OVALOCYTES BLD QL SMEAR: SLIGHT — SIGNIFICANT CHANGE UP
OVALOCYTES BLD QL SMEAR: SLIGHT — SIGNIFICANT CHANGE UP
PCO2 BLDA: 29 MMHG — LOW (ref 32–45)
PCO2 BLDA: 37 MMHG — SIGNIFICANT CHANGE UP (ref 32–45)
PCO2 BLDA: 38 MMHG — SIGNIFICANT CHANGE UP (ref 32–45)
PCO2 BLDA: 38 MMHG — SIGNIFICANT CHANGE UP (ref 32–45)
PCO2 BLDA: 44 MMHG — SIGNIFICANT CHANGE UP (ref 32–45)
PH BLDA: 7.37 — SIGNIFICANT CHANGE UP (ref 7.35–7.45)
PH BLDA: 7.45 — SIGNIFICANT CHANGE UP (ref 7.35–7.45)
PH BLDA: 7.46 — HIGH (ref 7.35–7.45)
PH BLDA: 7.46 — HIGH (ref 7.35–7.45)
PH BLDA: 7.53 — HIGH (ref 7.35–7.45)
PH UR: 6.5 — SIGNIFICANT CHANGE UP (ref 5–8)
PHOSPHATE SERPL-MCNC: 2 MG/DL — LOW (ref 2.5–4.5)
PHOSPHATE SERPL-MCNC: 2.2 MG/DL — LOW (ref 2.5–4.5)
PHOSPHATE SERPL-MCNC: 2.7 MG/DL — SIGNIFICANT CHANGE UP (ref 2.5–4.5)
PHOSPHATE SERPL-MCNC: 2.8 MG/DL — SIGNIFICANT CHANGE UP (ref 2.5–4.5)
PLAT MORPH BLD: NORMAL — SIGNIFICANT CHANGE UP
PLAT MORPH BLD: NORMAL — SIGNIFICANT CHANGE UP
PLATELET # BLD AUTO: 164 K/UL — SIGNIFICANT CHANGE UP (ref 150–400)
PLATELET # BLD AUTO: 173 K/UL — SIGNIFICANT CHANGE UP (ref 150–400)
PLATELET # BLD AUTO: 173 K/UL — SIGNIFICANT CHANGE UP (ref 150–400)
PLATELET # BLD AUTO: 174 K/UL — SIGNIFICANT CHANGE UP (ref 150–400)
PO2 BLDA: 133 MMHG — HIGH (ref 83–108)
PO2 BLDA: 153 MMHG — HIGH (ref 83–108)
PO2 BLDA: 153 MMHG — HIGH (ref 83–108)
PO2 BLDA: 303 MMHG — HIGH (ref 83–108)
PO2 BLDA: 330 MMHG — HIGH (ref 83–108)
POIKILOCYTOSIS BLD QL AUTO: SLIGHT — SIGNIFICANT CHANGE UP
POIKILOCYTOSIS BLD QL AUTO: SLIGHT — SIGNIFICANT CHANGE UP
POLYCHROMASIA BLD QL SMEAR: SLIGHT — SIGNIFICANT CHANGE UP
POTASSIUM SERPL-MCNC: 3.6 MMOL/L — SIGNIFICANT CHANGE UP (ref 3.5–5.3)
POTASSIUM SERPL-MCNC: 3.7 MMOL/L — SIGNIFICANT CHANGE UP (ref 3.5–5.3)
POTASSIUM SERPL-MCNC: 3.9 MMOL/L — SIGNIFICANT CHANGE UP (ref 3.5–5.3)
POTASSIUM SERPL-MCNC: 4.1 MMOL/L — SIGNIFICANT CHANGE UP (ref 3.5–5.3)
POTASSIUM SERPL-SCNC: 3.6 MMOL/L — SIGNIFICANT CHANGE UP (ref 3.5–5.3)
POTASSIUM SERPL-SCNC: 3.7 MMOL/L — SIGNIFICANT CHANGE UP (ref 3.5–5.3)
POTASSIUM SERPL-SCNC: 3.9 MMOL/L — SIGNIFICANT CHANGE UP (ref 3.5–5.3)
POTASSIUM SERPL-SCNC: 4.1 MMOL/L — SIGNIFICANT CHANGE UP (ref 3.5–5.3)
PROT SERPL-MCNC: 5.7 G/DL — LOW (ref 6–8.3)
PROT SERPL-MCNC: 5.9 G/DL — LOW (ref 6–8.3)
PROT SERPL-MCNC: 6.1 G/DL — SIGNIFICANT CHANGE UP (ref 6–8.3)
PROT SERPL-MCNC: 6.2 G/DL — SIGNIFICANT CHANGE UP (ref 6–8.3)
PROT UR-MCNC: 100 MG/DL
PROTHROM AB SERPL-ACNC: 10.7 SEC — SIGNIFICANT CHANGE UP (ref 9.5–13)
PROTHROM AB SERPL-ACNC: 11.1 SEC — SIGNIFICANT CHANGE UP (ref 9.5–13)
PROTHROM AB SERPL-ACNC: 11.3 SEC — SIGNIFICANT CHANGE UP (ref 9.5–13)
PROTHROM AB SERPL-ACNC: 11.3 SEC — SIGNIFICANT CHANGE UP (ref 9.5–13)
RBC # BLD: 2.62 M/UL — LOW (ref 3.8–5.2)
RBC # BLD: 2.72 M/UL — LOW (ref 3.8–5.2)
RBC # BLD: 2.81 M/UL — LOW (ref 3.8–5.2)
RBC # BLD: 2.89 M/UL — LOW (ref 3.8–5.2)
RBC # FLD: 15.1 % — HIGH (ref 10.3–14.5)
RBC # FLD: 15.3 % — HIGH (ref 10.3–14.5)
RBC # FLD: 15.4 % — HIGH (ref 10.3–14.5)
RBC # FLD: 15.5 % — HIGH (ref 10.3–14.5)
RBC BLD AUTO: ABNORMAL
RBC BLD AUTO: ABNORMAL
RBC CASTS # UR COMP ASSIST: 5 /HPF — HIGH (ref 0–4)
SAO2 % BLDA: 97.4 % — SIGNIFICANT CHANGE UP (ref 94–98)
SAO2 % BLDA: 97.6 % — SIGNIFICANT CHANGE UP (ref 94–98)
SAO2 % BLDA: 97.7 % — SIGNIFICANT CHANGE UP (ref 94–98)
SAO2 % BLDA: 97.9 % — SIGNIFICANT CHANGE UP (ref 94–98)
SAO2 % BLDA: 98 % — SIGNIFICANT CHANGE UP (ref 94–98)
SARS-COV-2 RNA SPEC QL NAA+PROBE: SIGNIFICANT CHANGE UP
SCHISTOCYTES BLD QL AUTO: SLIGHT — SIGNIFICANT CHANGE UP
SODIUM SERPL-SCNC: 141 MMOL/L — SIGNIFICANT CHANGE UP (ref 135–145)
SODIUM SERPL-SCNC: 142 MMOL/L — SIGNIFICANT CHANGE UP (ref 135–145)
SODIUM SERPL-SCNC: 143 MMOL/L — SIGNIFICANT CHANGE UP (ref 135–145)
SODIUM SERPL-SCNC: 145 MMOL/L — SIGNIFICANT CHANGE UP (ref 135–145)
SP GR SPEC: 1.03 — SIGNIFICANT CHANGE UP (ref 1–1.03)
SPECIMEN SOURCE: SIGNIFICANT CHANGE UP
SPHEROCYTES BLD QL SMEAR: SLIGHT — SIGNIFICANT CHANGE UP
SQUAMOUS # UR AUTO: 3 /HPF — SIGNIFICANT CHANGE UP (ref 0–5)
SUFENTANIL UR QUANT: SIGNIFICANT CHANGE UP NG/MG CREAT
TARGETS BLD QL SMEAR: SLIGHT — SIGNIFICANT CHANGE UP
TROPONIN T, HIGH SENSITIVITY RESULT: <6 NG/L — SIGNIFICANT CHANGE UP (ref 0–51)
UROBILINOGEN FLD QL: 1 MG/DL — SIGNIFICANT CHANGE UP (ref 0.2–1)
VARIANT LYMPHS # BLD: 2.3 % — SIGNIFICANT CHANGE UP (ref 0–6)
WBC # BLD: 10.66 K/UL — HIGH (ref 3.8–10.5)
WBC # BLD: 11.23 K/UL — HIGH (ref 3.8–10.5)
WBC # BLD: 11.53 K/UL — HIGH (ref 3.8–10.5)
WBC # BLD: 8.68 K/UL — SIGNIFICANT CHANGE UP (ref 3.8–10.5)
WBC # FLD AUTO: 10.66 K/UL — HIGH (ref 3.8–10.5)
WBC # FLD AUTO: 11.23 K/UL — HIGH (ref 3.8–10.5)
WBC # FLD AUTO: 11.53 K/UL — HIGH (ref 3.8–10.5)
WBC # FLD AUTO: 8.68 K/UL — SIGNIFICANT CHANGE UP (ref 3.8–10.5)
WBC UR QL: 3 /HPF — SIGNIFICANT CHANGE UP (ref 0–5)

## 2024-05-25 PROCEDURE — 99233 SBSQ HOSP IP/OBS HIGH 50: CPT | Mod: GC

## 2024-05-25 PROCEDURE — 71045 X-RAY EXAM CHEST 1 VIEW: CPT | Mod: 26

## 2024-05-25 RX ORDER — POTASSIUM CHLORIDE 20 MEQ
40 PACKET (EA) ORAL ONCE
Refills: 0 | Status: COMPLETED | OUTPATIENT
Start: 2024-05-25 | End: 2024-05-25

## 2024-05-25 RX ORDER — POTASSIUM PHOSPHATE, MONOBASIC POTASSIUM PHOSPHATE, DIBASIC 236; 224 MG/ML; MG/ML
15 INJECTION, SOLUTION INTRAVENOUS ONCE
Refills: 0 | Status: COMPLETED | OUTPATIENT
Start: 2024-05-25 | End: 2024-05-25

## 2024-05-25 RX ORDER — FUROSEMIDE 40 MG
40 TABLET ORAL ONCE
Refills: 0 | Status: COMPLETED | OUTPATIENT
Start: 2024-05-25 | End: 2024-05-25

## 2024-05-25 RX ADMIN — Medication 3 MILLILITER(S): at 16:00

## 2024-05-25 RX ADMIN — PIPERACILLIN AND TAZOBACTAM 25 GRAM(S): 4; .5 INJECTION, POWDER, LYOPHILIZED, FOR SOLUTION INTRAVENOUS at 05:12

## 2024-05-25 RX ADMIN — Medication 1 DROP(S): at 12:09

## 2024-05-25 RX ADMIN — PANTOPRAZOLE SODIUM 40 MILLIGRAM(S): 20 TABLET, DELAYED RELEASE ORAL at 12:05

## 2024-05-25 RX ADMIN — Medication 1 DROP(S): at 02:39

## 2024-05-25 RX ADMIN — ENOXAPARIN SODIUM 40 MILLIGRAM(S): 100 INJECTION SUBCUTANEOUS at 10:01

## 2024-05-25 RX ADMIN — Medication 1 DROP(S): at 00:35

## 2024-05-25 RX ADMIN — Medication 2: at 18:04

## 2024-05-25 RX ADMIN — Medication 40 MILLIEQUIVALENT(S): at 01:42

## 2024-05-25 RX ADMIN — Medication 1 DROP(S): at 18:04

## 2024-05-25 RX ADMIN — Medication 1 DROP(S): at 16:00

## 2024-05-25 RX ADMIN — POTASSIUM PHOSPHATE, MONOBASIC POTASSIUM PHOSPHATE, DIBASIC 62.5 MILLIMOLE(S): 236; 224 INJECTION, SOLUTION INTRAVENOUS at 08:35

## 2024-05-25 RX ADMIN — Medication 3 MILLILITER(S): at 21:54

## 2024-05-25 RX ADMIN — CHLORHEXIDINE GLUCONATE 15 MILLILITER(S): 213 SOLUTION TOPICAL at 18:04

## 2024-05-25 RX ADMIN — PIPERACILLIN AND TAZOBACTAM 25 GRAM(S): 4; .5 INJECTION, POWDER, LYOPHILIZED, FOR SOLUTION INTRAVENOUS at 12:05

## 2024-05-25 RX ADMIN — Medication 3 MILLILITER(S): at 09:01

## 2024-05-25 RX ADMIN — CHLORHEXIDINE GLUCONATE 1 APPLICATION(S): 213 SOLUTION TOPICAL at 05:13

## 2024-05-25 RX ADMIN — Medication 6: at 05:33

## 2024-05-25 RX ADMIN — Medication 40 MILLIGRAM(S): at 01:04

## 2024-05-25 RX ADMIN — Medication 25 MICROGRAM(S)/HR: at 02:42

## 2024-05-25 RX ADMIN — Medication 4: at 12:04

## 2024-05-25 RX ADMIN — Medication 1 DROP(S): at 04:30

## 2024-05-25 RX ADMIN — CHLORHEXIDINE GLUCONATE 15 MILLILITER(S): 213 SOLUTION TOPICAL at 05:13

## 2024-05-25 RX ADMIN — Medication 1 DROP(S): at 10:01

## 2024-05-25 RX ADMIN — Medication 1 DROP(S): at 14:14

## 2024-05-25 RX ADMIN — Medication 1 DROP(S): at 22:03

## 2024-05-25 RX ADMIN — VASOPRESSIN 1.25 UNIT(S)/HR: 20 INJECTION INTRAVENOUS at 06:41

## 2024-05-25 RX ADMIN — Medication 3 MILLILITER(S): at 03:06

## 2024-05-25 RX ADMIN — Medication 1 DROP(S): at 06:20

## 2024-05-25 RX ADMIN — Medication 1 DROP(S): at 08:44

## 2024-05-25 NOTE — PRE-ANESTHESIA EVALUATION ADULT - NSANTHPMHFT_GEN_ALL_CORE
44M pmhx of bipolar, htn, HLD, RA, GERD, hemorrhoids, MARCELL, lupus and fibromyalgia found to be in cardiac arrest possible 2/2 drug overdose (cocaine w/ possibly laced with fentanyl) admitted to the micu for post resuscitation care. Trops wnl, ekg nonischemic. CT head w anoxic brain injury. Neurology and neurosurgery consulted. Not a surgical candidate. Concern for cerebral edema, patient received hypertonic saline to reach sodium goal. 5/21, sodium above 160, started on d5. Nephrology consulted, given ddavp. Patient maintained on normothermia. Brainstem w/ areflexia. Apnea test positive on 5/22. Family consented to organ donation.

## 2024-05-25 NOTE — PROGRESS NOTE ADULT - SUBJECTIVE AND OBJECTIVE BOX
** INCOMPLETE **     INTERVAL HPI/OVERNIGHT EVENTS:    SUBJECTIVE: Patient seen and examined at bedside.    VITAL SIGNS:  ICU Vital Signs Last 24 Hrs  T(C): 37.3 (25 May 2024 06:05), Max: 37.9 (24 May 2024 14:41)  T(F): 99.1 (25 May 2024 06:05), Max: 100.2 (24 May 2024 14:41)  HR: 91 (25 May 2024 05:00) (84 - 119)  BP: --  BP(mean): --  ABP: 139/73 (25 May 2024 05:00) (120/62 - 210/121)  ABP(mean): 102 (25 May 2024 05:00) (86 - 162)  RR: 14 (25 May 2024 05:00) (14 - 16)  SpO2: 98% (25 May 2024 05:00) (94% - 100%)    O2 Parameters below as of 25 May 2024 05:00  Patient On (Oxygen Delivery Method): ventilator    O2 Concentration (%): 40    Mode: AC/ CMV (Assist Control/ Continuous Mandatory Ventilation), RR (machine): 15, FiO2: 40, PEEP: 10, ITime: 2, MAP: 17, PC: 25, PIP: 25    05-23 @ 07:01  -  05-24 @ 07:00  --------------------------------------------------------  IN: 5778.8 mL / OUT: 4826 mL / NET: 952.8 mL    05-24 @ 07:01  -  05-25 @ 06:24  --------------------------------------------------------  IN: 1650.2 mL / OUT: 4432 mL / NET: -2781.8 mL    CAPILLARY BLOOD GLUCOSE    POCT Blood Glucose.: 264 mg/dL (25 May 2024 05:16)    PHYSICAL EXAM:  Constitutional: NAD  HEENT: NC/AT; PERRL, anicteric sclera; MMM  Neck: supple, no JVD  Cardiovascular: +S1/S2, RRR  Respiratory: CTA B/L, no W/R/R  Gastrointestinal: abdomen soft, NT/ND; no rebound or guarding; +BSx4  Genitourinary: no suprapubic tenderness or fullness  Extremities: WWP; no LE edema; no clubbing or cyanosis  Vascular: 2+ radial, DP/PT and femoral pulses B/L  Dermatologic: normal color and turgor; no visible rashes  Neurological: AO     MEDICATIONS:  MEDICATIONS  (STANDING):  albuterol/ipratropium for Nebulization 3 milliLiter(s) Nebulizer every 6 hours  artificial  tears Solution 1 Drop(s) Both EYES every 2 hours  chlorhexidine 0.12% Liquid 15 milliLiter(s) Oral Mucosa every 12 hours  chlorhexidine 2% Cloths 1 Application(s) Topical <User Schedule>  dextrose 10% Bolus 125 milliLiter(s) IV Bolus once  dextrose 5%. 1000 milliLiter(s) (50 mL/Hr) IV Continuous <Continuous>  dextrose 5%. 1000 milliLiter(s) (100 mL/Hr) IV Continuous <Continuous>  dextrose 50% Injectable 12.5 Gram(s) IV Push once  dextrose 50% Injectable 25 Gram(s) IV Push once  enoxaparin Injectable 40 milliGRAM(s) SubCutaneous every 24 hours  fluconAZOLE IVPB 400 milliGRAM(s) IV Intermittent every 24 hours  glucagon  Injectable 1 milliGRAM(s) IntraMuscular once  insulin lispro (ADMELOG) corrective regimen sliding scale   SubCutaneous every 6 hours  levothyroxine Infusion 10 MICROgram(s)/Hr (25 mL/Hr) IV Continuous <Continuous>  niCARdipine Infusion 3 mG/Hr (15 mL/Hr) IV Continuous <Continuous>  pantoprazole  Injectable 40 milliGRAM(s) IV Push daily  piperacillin/tazobactam IVPB.. 3.375 Gram(s) IV Intermittent every 8 hours  vasopressin Infusion. 0.5 Unit(s)/Hr (1.25 mL/Hr) IV Continuous <Continuous>    MEDICATIONS  (PRN):  acetylcysteine 10%  Inhalation 4 milliLiter(s) Inhalation every 4 hours PRN THICK SECRETIONS  dextrose Oral Gel 15 Gram(s) Oral once PRN Blood Glucose LESS THAN 70 milliGRAM(s)/deciliter  fentaNYL    Injectable 50 MICROGram(s) IV Push every 2 hours PRN severe pain/vent dyssynchrony  sodium chloride 0.9% lock flush 10 milliLiter(s) IV Push every 1 hour PRN Pre/post blood products, medications, blood draw, and to maintain line patency    LABS:                        7.6    11.53 )-----------( 173      ( 25 May 2024 06:08 )             23.5     05-24    143  |  108  |  20  ----------------------------<  265<H>  3.6   |  23  |  0.61    Ca    8.4      24 May 2024 23:51  Phos  2.8     05-24  Mg     2.1     05-24    TPro  6.2  /  Alb  3.5  /  TBili  0.3  /  DBili  <0.2  /  AST  54<H>  /  ALT  103<H>  /  AlkPhos  75  05-24    PT/INR - ( 25 May 2024 06:08 )   PT: 11.1 sec;   INR: 0.97       PTT - ( 25 May 2024 06:08 )  PTT:25.2 sec  Urinalysis Basic - ( 24 May 2024 23:51 )    Color: x / Appearance: x / SG: x / pH: x  Gluc: 265 mg/dL / Ketone: x  / Bili: x / Urobili: x   Blood: x / Protein: x / Nitrite: x   Leuk Esterase: x / RBC: x / WBC x   Sq Epi: x / Non Sq Epi: x / Bacteria: x    RADIOLOGY & ADDITIONAL TESTS: Reviewed. INTERVAL HPI/OVERNIGHT EVENTS: given lasix 40    SUBJECTIVE: Patient seen and examined at bedside. comatose    VITAL SIGNS:  ICU Vital Signs Last 24 Hrs  T(C): 37.3 (25 May 2024 06:05), Max: 37.9 (24 May 2024 14:41)  T(F): 99.1 (25 May 2024 06:05), Max: 100.2 (24 May 2024 14:41)  HR: 91 (25 May 2024 05:00) (84 - 119)  BP: --  BP(mean): --  ABP: 139/73 (25 May 2024 05:00) (120/62 - 210/121)  ABP(mean): 102 (25 May 2024 05:00) (86 - 162)  RR: 14 (25 May 2024 05:00) (14 - 16)  SpO2: 98% (25 May 2024 05:00) (94% - 100%)    O2 Parameters below as of 25 May 2024 05:00  Patient On (Oxygen Delivery Method): ventilator    O2 Concentration (%): 40    Mode: AC/ CMV (Assist Control/ Continuous Mandatory Ventilation), RR (machine): 15, FiO2: 40, PEEP: 10, ITime: 2, MAP: 17, PC: 25, PIP: 25    05-23 @ 07:01  -  05-24 @ 07:00  --------------------------------------------------------  IN: 5778.8 mL / OUT: 4826 mL / NET: 952.8 mL    05-24 @ 07:01  -  05-25 @ 06:24  --------------------------------------------------------  IN: 1650.2 mL / OUT: 4432 mL / NET: -2781.8 mL    CAPILLARY BLOOD GLUCOSE    POCT Blood Glucose.: 264 mg/dL (25 May 2024 05:16)    PHYSICAL EXAM:  Constitutional: supine  HEENT: no conjunctival injection  Neck: no jvd  Cardiovascular: rrr  Respiratory: CTA B/L  Gastrointestinal: abdomen distended  Extremities: WWP, no edema  Dermatologic: no rashes, no wounds    MEDICATIONS:  MEDICATIONS  (STANDING):  albuterol/ipratropium for Nebulization 3 milliLiter(s) Nebulizer every 6 hours  artificial  tears Solution 1 Drop(s) Both EYES every 2 hours  chlorhexidine 0.12% Liquid 15 milliLiter(s) Oral Mucosa every 12 hours  chlorhexidine 2% Cloths 1 Application(s) Topical <User Schedule>  dextrose 10% Bolus 125 milliLiter(s) IV Bolus once  dextrose 5%. 1000 milliLiter(s) (50 mL/Hr) IV Continuous <Continuous>  dextrose 5%. 1000 milliLiter(s) (100 mL/Hr) IV Continuous <Continuous>  dextrose 50% Injectable 12.5 Gram(s) IV Push once  dextrose 50% Injectable 25 Gram(s) IV Push once  enoxaparin Injectable 40 milliGRAM(s) SubCutaneous every 24 hours  fluconAZOLE IVPB 400 milliGRAM(s) IV Intermittent every 24 hours  glucagon  Injectable 1 milliGRAM(s) IntraMuscular once  insulin lispro (ADMELOG) corrective regimen sliding scale   SubCutaneous every 6 hours  levothyroxine Infusion 10 MICROgram(s)/Hr (25 mL/Hr) IV Continuous <Continuous>  niCARdipine Infusion 3 mG/Hr (15 mL/Hr) IV Continuous <Continuous>  pantoprazole  Injectable 40 milliGRAM(s) IV Push daily  piperacillin/tazobactam IVPB.. 3.375 Gram(s) IV Intermittent every 8 hours  vasopressin Infusion. 0.5 Unit(s)/Hr (1.25 mL/Hr) IV Continuous <Continuous>    MEDICATIONS  (PRN):  acetylcysteine 10%  Inhalation 4 milliLiter(s) Inhalation every 4 hours PRN THICK SECRETIONS  dextrose Oral Gel 15 Gram(s) Oral once PRN Blood Glucose LESS THAN 70 milliGRAM(s)/deciliter  fentaNYL    Injectable 50 MICROGram(s) IV Push every 2 hours PRN severe pain/vent dyssynchrony  sodium chloride 0.9% lock flush 10 milliLiter(s) IV Push every 1 hour PRN Pre/post blood products, medications, blood draw, and to maintain line patency    LABS:                        7.6    11.53 )-----------( 173      ( 25 May 2024 06:08 )             23.5     05-24    143  |  108  |  20  ----------------------------<  265<H>  3.6   |  23  |  0.61    Ca    8.4      24 May 2024 23:51  Phos  2.8     05-24  Mg     2.1     05-24    TPro  6.2  /  Alb  3.5  /  TBili  0.3  /  DBili  <0.2  /  AST  54<H>  /  ALT  103<H>  /  AlkPhos  75  05-24    PT/INR - ( 25 May 2024 06:08 )   PT: 11.1 sec;   INR: 0.97       PTT - ( 25 May 2024 06:08 )  PTT:25.2 sec  Urinalysis Basic - ( 24 May 2024 23:51 )    Color: x / Appearance: x / SG: x / pH: x  Gluc: 265 mg/dL / Ketone: x  / Bili: x / Urobili: x   Blood: x / Protein: x / Nitrite: x   Leuk Esterase: x / RBC: x / WBC x   Sq Epi: x / Non Sq Epi: x / Bacteria: x    RADIOLOGY & ADDITIONAL TESTS: Reviewed. Hospital Course:  44M pmhx of bipolar, htn, HLD, RA, GERD, hemorrhoids, MARCELL, lupus and fibromyalgia found to be in cardiac arrest possible 2/2 drug overdose (cocaine w/ possibly laced with fentanyl) admitted to the micu for post resuscitation care. Trops wnl, ekg nonischemic. CT head w anoxic brain injury. Neurology and neurosurgery consulted. Not a surgical candidate. Concern for cerebral edema, patient received hypertonic saline to reach sodium goal. 5/21, sodium above 160, started on d5. Nephrology consulted, given ddavp. Patient maintained on normothermia. Brainstem w/ areflexia. Apnea test positive on 5/22. Family consented to organ donation.    INTERVAL HPI/OVERNIGHT EVENTS: given lasix 40    SUBJECTIVE: Patient seen and examined at bedside. comatose    VITAL SIGNS:  ICU Vital Signs Last 24 Hrs  T(C): 37.3 (25 May 2024 06:05), Max: 37.9 (24 May 2024 14:41)  T(F): 99.1 (25 May 2024 06:05), Max: 100.2 (24 May 2024 14:41)  HR: 91 (25 May 2024 05:00) (84 - 119)  BP: --  BP(mean): --  ABP: 139/73 (25 May 2024 05:00) (120/62 - 210/121)  ABP(mean): 102 (25 May 2024 05:00) (86 - 162)  RR: 14 (25 May 2024 05:00) (14 - 16)  SpO2: 98% (25 May 2024 05:00) (94% - 100%)    O2 Parameters below as of 25 May 2024 05:00  Patient On (Oxygen Delivery Method): ventilator    O2 Concentration (%): 40    Mode: AC/ CMV (Assist Control/ Continuous Mandatory Ventilation), RR (machine): 15, FiO2: 40, PEEP: 10, ITime: 2, MAP: 17, PC: 25, PIP: 25    05-23 @ 07:01  -  05-24 @ 07:00  --------------------------------------------------------  IN: 5778.8 mL / OUT: 4826 mL / NET: 952.8 mL    05-24 @ 07:01  -  05-25 @ 06:24  --------------------------------------------------------  IN: 1650.2 mL / OUT: 4432 mL / NET: -2781.8 mL    CAPILLARY BLOOD GLUCOSE    POCT Blood Glucose.: 264 mg/dL (25 May 2024 05:16)    PHYSICAL EXAM:  Constitutional: supine  HEENT: no conjunctival injection  Neck: no jvd  Cardiovascular: rrr  Respiratory: CTA B/L  Gastrointestinal: abdomen distended  Extremities: WWP, no edema  Dermatologic: no rashes, no wounds    MEDICATIONS:  MEDICATIONS  (STANDING):  albuterol/ipratropium for Nebulization 3 milliLiter(s) Nebulizer every 6 hours  artificial  tears Solution 1 Drop(s) Both EYES every 2 hours  chlorhexidine 0.12% Liquid 15 milliLiter(s) Oral Mucosa every 12 hours  chlorhexidine 2% Cloths 1 Application(s) Topical <User Schedule>  dextrose 10% Bolus 125 milliLiter(s) IV Bolus once  dextrose 5%. 1000 milliLiter(s) (50 mL/Hr) IV Continuous <Continuous>  dextrose 5%. 1000 milliLiter(s) (100 mL/Hr) IV Continuous <Continuous>  dextrose 50% Injectable 12.5 Gram(s) IV Push once  dextrose 50% Injectable 25 Gram(s) IV Push once  enoxaparin Injectable 40 milliGRAM(s) SubCutaneous every 24 hours  fluconAZOLE IVPB 400 milliGRAM(s) IV Intermittent every 24 hours  glucagon  Injectable 1 milliGRAM(s) IntraMuscular once  insulin lispro (ADMELOG) corrective regimen sliding scale   SubCutaneous every 6 hours  levothyroxine Infusion 10 MICROgram(s)/Hr (25 mL/Hr) IV Continuous <Continuous>  niCARdipine Infusion 3 mG/Hr (15 mL/Hr) IV Continuous <Continuous>  pantoprazole  Injectable 40 milliGRAM(s) IV Push daily  piperacillin/tazobactam IVPB.. 3.375 Gram(s) IV Intermittent every 8 hours  vasopressin Infusion. 0.5 Unit(s)/Hr (1.25 mL/Hr) IV Continuous <Continuous>    MEDICATIONS  (PRN):  acetylcysteine 10%  Inhalation 4 milliLiter(s) Inhalation every 4 hours PRN THICK SECRETIONS  dextrose Oral Gel 15 Gram(s) Oral once PRN Blood Glucose LESS THAN 70 milliGRAM(s)/deciliter  fentaNYL    Injectable 50 MICROGram(s) IV Push every 2 hours PRN severe pain/vent dyssynchrony  sodium chloride 0.9% lock flush 10 milliLiter(s) IV Push every 1 hour PRN Pre/post blood products, medications, blood draw, and to maintain line patency    LABS:                        7.6    11.53 )-----------( 173      ( 25 May 2024 06:08 )             23.5     05-24    143  |  108  |  20  ----------------------------<  265<H>  3.6   |  23  |  0.61    Ca    8.4      24 May 2024 23:51  Phos  2.8     05-24  Mg     2.1     05-24    TPro  6.2  /  Alb  3.5  /  TBili  0.3  /  DBili  <0.2  /  AST  54<H>  /  ALT  103<H>  /  AlkPhos  75  05-24    PT/INR - ( 25 May 2024 06:08 )   PT: 11.1 sec;   INR: 0.97       PTT - ( 25 May 2024 06:08 )  PTT:25.2 sec  Urinalysis Basic - ( 24 May 2024 23:51 )    Color: x / Appearance: x / SG: x / pH: x  Gluc: 265 mg/dL / Ketone: x  / Bili: x / Urobili: x   Blood: x / Protein: x / Nitrite: x   Leuk Esterase: x / RBC: x / WBC x   Sq Epi: x / Non Sq Epi: x / Bacteria: x    RADIOLOGY & ADDITIONAL TESTS: Reviewed. Hospital Course:  44M pmhx of bipolar, htn, HLD, RA, GERD, hemorrhoids, MARCELL, lupus and fibromyalgia found to be in cardiac arrest possible 2/2 drug overdose (cocaine w/ possibly laced with fentanyl) admitted to the micu for post resuscitation care. Trops wnl, ekg nonischemic. CT head w anoxic brain injury. Neurology and neurosurgery consulted. Not a surgical candidate. Concern for cerebral edema, patient received hypertonic saline to reach sodium goal. 5/21, sodium above 160, started on d5. Nephrology consulted, given ddavp. Patient maintained on normothermia. Brainstem w/ areflexia. Apnea test positive on 5/22. Family consented to organ donation. Organ donation 5/25.    INTERVAL HPI/OVERNIGHT EVENTS: given lasix 40    SUBJECTIVE: Patient seen and examined at bedside. comatose    VITAL SIGNS:  ICU Vital Signs Last 24 Hrs  T(C): 37.3 (25 May 2024 06:05), Max: 37.9 (24 May 2024 14:41)  T(F): 99.1 (25 May 2024 06:05), Max: 100.2 (24 May 2024 14:41)  HR: 91 (25 May 2024 05:00) (84 - 119)  BP: --  BP(mean): --  ABP: 139/73 (25 May 2024 05:00) (120/62 - 210/121)  ABP(mean): 102 (25 May 2024 05:00) (86 - 162)  RR: 14 (25 May 2024 05:00) (14 - 16)  SpO2: 98% (25 May 2024 05:00) (94% - 100%)    O2 Parameters below as of 25 May 2024 05:00  Patient On (Oxygen Delivery Method): ventilator    O2 Concentration (%): 40    Mode: AC/ CMV (Assist Control/ Continuous Mandatory Ventilation), RR (machine): 15, FiO2: 40, PEEP: 10, ITime: 2, MAP: 17, PC: 25, PIP: 25    05-23 @ 07:01  -  05-24 @ 07:00  --------------------------------------------------------  IN: 5778.8 mL / OUT: 4826 mL / NET: 952.8 mL    05-24 @ 07:01  -  05-25 @ 06:24  --------------------------------------------------------  IN: 1650.2 mL / OUT: 4432 mL / NET: -2781.8 mL    CAPILLARY BLOOD GLUCOSE    POCT Blood Glucose.: 264 mg/dL (25 May 2024 05:16)    PHYSICAL EXAM:  Constitutional: supine  HEENT: no conjunctival injection  Neck: no jvd  Cardiovascular: rrr  Respiratory: CTA B/L  Gastrointestinal: abdomen distended  Extremities: WWP, no edema  Dermatologic: no rashes, no wounds    MEDICATIONS:  MEDICATIONS  (STANDING):  albuterol/ipratropium for Nebulization 3 milliLiter(s) Nebulizer every 6 hours  artificial  tears Solution 1 Drop(s) Both EYES every 2 hours  chlorhexidine 0.12% Liquid 15 milliLiter(s) Oral Mucosa every 12 hours  chlorhexidine 2% Cloths 1 Application(s) Topical <User Schedule>  dextrose 10% Bolus 125 milliLiter(s) IV Bolus once  dextrose 5%. 1000 milliLiter(s) (50 mL/Hr) IV Continuous <Continuous>  dextrose 5%. 1000 milliLiter(s) (100 mL/Hr) IV Continuous <Continuous>  dextrose 50% Injectable 12.5 Gram(s) IV Push once  dextrose 50% Injectable 25 Gram(s) IV Push once  enoxaparin Injectable 40 milliGRAM(s) SubCutaneous every 24 hours  fluconAZOLE IVPB 400 milliGRAM(s) IV Intermittent every 24 hours  glucagon  Injectable 1 milliGRAM(s) IntraMuscular once  insulin lispro (ADMELOG) corrective regimen sliding scale   SubCutaneous every 6 hours  levothyroxine Infusion 10 MICROgram(s)/Hr (25 mL/Hr) IV Continuous <Continuous>  niCARdipine Infusion 3 mG/Hr (15 mL/Hr) IV Continuous <Continuous>  pantoprazole  Injectable 40 milliGRAM(s) IV Push daily  piperacillin/tazobactam IVPB.. 3.375 Gram(s) IV Intermittent every 8 hours  vasopressin Infusion. 0.5 Unit(s)/Hr (1.25 mL/Hr) IV Continuous <Continuous>    MEDICATIONS  (PRN):  acetylcysteine 10%  Inhalation 4 milliLiter(s) Inhalation every 4 hours PRN THICK SECRETIONS  dextrose Oral Gel 15 Gram(s) Oral once PRN Blood Glucose LESS THAN 70 milliGRAM(s)/deciliter  fentaNYL    Injectable 50 MICROGram(s) IV Push every 2 hours PRN severe pain/vent dyssynchrony  sodium chloride 0.9% lock flush 10 milliLiter(s) IV Push every 1 hour PRN Pre/post blood products, medications, blood draw, and to maintain line patency    LABS:                        7.6    11.53 )-----------( 173      ( 25 May 2024 06:08 )             23.5     05-24    143  |  108  |  20  ----------------------------<  265<H>  3.6   |  23  |  0.61    Ca    8.4      24 May 2024 23:51  Phos  2.8     05-24  Mg     2.1     05-24    TPro  6.2  /  Alb  3.5  /  TBili  0.3  /  DBili  <0.2  /  AST  54<H>  /  ALT  103<H>  /  AlkPhos  75  05-24    PT/INR - ( 25 May 2024 06:08 )   PT: 11.1 sec;   INR: 0.97       PTT - ( 25 May 2024 06:08 )  PTT:25.2 sec  Urinalysis Basic - ( 24 May 2024 23:51 )    Color: x / Appearance: x / SG: x / pH: x  Gluc: 265 mg/dL / Ketone: x  / Bili: x / Urobili: x   Blood: x / Protein: x / Nitrite: x   Leuk Esterase: x / RBC: x / WBC x   Sq Epi: x / Non Sq Epi: x / Bacteria: x    RADIOLOGY & ADDITIONAL TESTS: Reviewed.

## 2024-05-25 NOTE — PROGRESS NOTE ADULT - ATTENDING COMMENTS
SLE, RA, substance abuse now s/p cardiac arrest with brain death  physical as above  trial of prone position to see if more lung can be recruited  continue antibiotics  adjusting MV to optimize shunt
Bipolar, SLE. RA s/p cardiac arrest and now brain death after drug overdose  physical as above  patient had consented to organ donation  await coronary angiogram and bronchoscopy  hypernatremia resolving on free water  continue antibiotics
Anoxic brain injury likely secondary to accidental drug overdose and cardiac arrest, metabolic and respiratory acidosis.  physical as above  still triggering the ventilator  repeat BC re S. epidermidis bacteremia; possible contaminant on vancomycin/zosyn empirically  increase RR to 18  dose Na HCO3  continue 3% saline for target Na 150-160
Bipolar, RA, SLE with polysubstance abuse with anoxic encephalopathy after cardiac arrest apparently after drug overdose with pneumonia  physical as above  brain death criteria have been fulfilled  we will discuss with patient's daughter  empiric vanco and ceftriaxone thought the S. epidermidis may be a contaminant
On exam this morning, no brainstem reflexes were present on exam. Sodium up to 163 overnight then 158 this morning. Later in the day she met criteria for brain death and apnea test was performed which was positive.
SLE, RA, substance abuse with brain death after cardiac arrest  physical as above  continue steroids, vasopressin, NE, synthroid  continue antibiotics  plans in place for potential harvesting today
anoxic brain inury with cerebral swelling post cardiac arrest/overdose  pupils fixed and dilated, no brainstem reflexes other than vent triggering  receiving hypertonic saline and mannitol, appreciate neurocritical care input  no neurosurgical intervention  discussed with daughter at length  very poor prognosis

## 2024-05-25 NOTE — PROGRESS NOTE ADULT - ASSESSMENT
Assessment: 44 pmhx of bipolar (on lithium), htn (hctz), HLD, RA, GERD, hemorrhoids, MARCELL, lupus and fibromyalgia found to be in cardiac arrest possible 2/2 drug overdose (cocaine w/ possibly laced with fentanyl) admitted to the micu for post resuscitation care.    Neuro  #Brain death  5/22 performed 72 hours post cardiac resuscitation. Patient without confounding conditions  Coma confirmed without sign of arousal, or response to noxious stimulus (nailbed). Brainstem areflexia confirmed with absence of pupilar light reflex, vestibuloocular reflex, corneal reflex, gag reflex, cough reflex. Positive apnea testing performed w/ preoxygenation with 100% fio2, pCO2 greater that 60. No further ancilllary brain death testing.  - family requesting organ donation  - plan by live on protocol    #Anoxic brain injury 2/2 to cardiac arrest  UTox positive for cocaine.  CT Head - effacement of the cerebral sulci, ventricles and cisternal spaces secondary to cerebral edema. Findings consistent with anoxic brain injury.  - targeted temperature management, maintain normothermia (32 - 37.5C)    #Intubated  - off sedation    Pulm  #Intubation  On ventilator PC AC  Bronchoscopy 5/23. Prone    Cards  #Cardiac Arrest  ACLS x2. ROSC obtained.  UTox positive for cocaine; however no signs of vasospasm > acute coronary syndrome with negative trops and non-ischemic EKG. High suspicion of fentanyl overdose. Right heart and left heart cath 5/23    #HTN  home medications Losartan 100mg, HCTZ 25mg, Norvasc 6.25 BID.   - iv hydral 5 prn    GI  #Ileus  likely 2/2 to fentanyl overdose. Found with drug paraphernalia nearby.  CT Abdomen -colon dilated to 8 cm with gas and semi solid/liquid stool. Stomach distended with gas and fluid.   - OG decompression  - rectal tube    Renal  cristóbal    Endo  CRISTÓBAL    ID  #C/f staph epidermidis bacteremia  Blood culture revealing staph epidermidis  - on zosyn  - f/u surveillance blood culture, ngtd  - started on fluconazole    Heme  CRISTÓBAL    PPX  E: Replete  N: NPO  GI: ppi qD  DVT ppx: lovenox  Dispo: MICU     FULL CODE Assessment: 44 pmhx of bipolar (on lithium), htn (hctz), HLD, RA, GERD, hemorrhoids, MARCELL, lupus and fibromyalgia found to be in cardiac arrest possible 2/2 drug overdose (cocaine w/ possibly laced with fentanyl) admitted to the micu for post resuscitation care.    Neuro  #Brain death  5/22 performed 72 hours post cardiac resuscitation. Patient without confounding conditions  Coma confirmed without sign of arousal, or response to noxious stimulus (nailbed). Brainstem areflexia confirmed with absence of pupilar light reflex, vestibuloocular reflex, corneal reflex, gag reflex, cough reflex. Positive apnea testing performed w/ preoxygenation with 100% fio2, pCO2 greater that 60. No further ancilllary brain death testing.  - family requesting organ donation  - plan for OR 10PM 5/25  - plan by live on protocol    #Anoxic brain injury 2/2 to cardiac arrest  UTox positive for cocaine.  CT Head - effacement of the cerebral sulci, ventricles and cisternal spaces secondary to cerebral edema. Findings consistent with anoxic brain injury.  - targeted temperature management by liveon    #Intubated  - off sedation    Pulm  #Intubation  On ventilator PC AC  Bronchoscopy 5/23. Prone    Cards  #Cardiac Arrest  ACLS x2. ROSC obtained.  UTox positive for cocaine; however no signs of vasospasm > acute coronary syndrome with negative trops and non-ischemic EKG. High suspicion of fentanyl overdose. Right heart and left heart cath 5/23    #HTN  home medications Losartan 100mg, HCTZ 25mg, Norvasc 6.25 BID.   - iv hydral 5 prn    GI  #Ileus  likely 2/2 to fentanyl overdose. Found with drug paraphernalia nearby.  CT Abdomen -colon dilated to 8 cm with gas and semi solid/liquid stool. Stomach distended with gas and fluid.   - OG decompression  - rectal tube    Renal  cristóbal    Endo  CRISTÓBAL    ID  #C/f staph epidermidis bacteremia  Blood culture revealing staph epidermidis  - on zosyn  - f/u surveillance blood culture, ngtd  - c/w fluconazole    Heme  CRISTÓBAL    PPX  N: NPO  GI: ppi qD  DVT ppx: lovenox  Dispo: MICU     FULL CODE Assessment: 44 pmhx of bipolar (on lithium), htn (hctz), HLD, RA, GERD, hemorrhoids, MARCELL, lupus and fibromyalgia found to be in cardiac arrest possible 2/2 drug overdose (cocaine w/ possibly laced with fentanyl) admitted to the micu for post resuscitation care.    Neuro  #Brain death  5/22 performed 72 hours post cardiac resuscitation. Patient without confounding conditions  Coma confirmed without sign of arousal, or response to noxious stimulus (nailbed). Brainstem areflexia confirmed with absence of pupilar light reflex, vestibuloocular reflex, corneal reflex, gag reflex, cough reflex. Positive apnea testing performed w/ preoxygenation with 100% fio2, pCO2 greater that 60. No further ancilllary brain death testing.  - family requesting organ donation  - plan for OR 10PM 5/25  - plan by live on protocol  -     #Anoxic brain injury 2/2 to cardiac arrest  UTox positive for cocaine.  CT Head - effacement of the cerebral sulci, ventricles and cisternal spaces secondary to cerebral edema. Findings consistent with anoxic brain injury.  - targeted temperature management by liveon    #Intubated  - off sedation    Pulm  #Intubation  On ventilator PC AC  Bronchoscopy 5/23. Prone    Cards  #Cardiac Arrest  ACLS x2. ROSC obtained.  UTox positive for cocaine; however no signs of vasospasm > acute coronary syndrome with negative trops and non-ischemic EKG. High suspicion of fentanyl overdose. Right heart and left heart cath 5/23    #HTN  home medications Losartan 100mg, HCTZ 25mg, Norvasc 6.25 BID.   - iv hydral 5 prn    GI  #Ileus  likely 2/2 to fentanyl overdose. Found with drug paraphernalia nearby.  CT Abdomen -colon dilated to 8 cm with gas and semi solid/liquid stool. Stomach distended with gas and fluid.   - OG decompression  - rectal tube    Renal  cristóbal    Endo  CRISTÓBAL    ID  #C/f staph epidermidis bacteremia  Blood culture revealing staph epidermidis  - on zosyn  - f/u surveillance blood culture, ngtd  - c/w fluconazole    Heme  CRISTÓBAL    PPX  N: NPO  GI: ppi qD  DVT ppx: lovenox  Dispo: MICU     FULL CODE

## 2024-05-26 VITALS — TEMPERATURE: 98 F

## 2024-05-26 LAB
CULTURE RESULTS: SIGNIFICANT CHANGE UP
SPECIMEN SOURCE: SIGNIFICANT CHANGE UP

## 2024-05-27 LAB
CULTURE RESULTS: SIGNIFICANT CHANGE UP
SPECIMEN SOURCE: SIGNIFICANT CHANGE UP

## 2024-05-28 DIAGNOSIS — T40.2X1A POISONING BY OTHER OPIOIDS, ACCIDENTAL (UNINTENTIONAL), INITIAL ENCOUNTER: ICD-10-CM

## 2024-05-28 DIAGNOSIS — F31.9 BIPOLAR DISORDER, UNSPECIFIED: ICD-10-CM

## 2024-05-28 DIAGNOSIS — B95.7 OTHER STAPHYLOCOCCUS AS THE CAUSE OF DISEASES CLASSIFIED ELSEWHERE: ICD-10-CM

## 2024-05-28 DIAGNOSIS — T40.411A POISONING BY FENTANYL OR FENTANYL ANALOGS, ACCIDENTAL (UNINTENTIONAL), INITIAL ENCOUNTER: ICD-10-CM

## 2024-05-28 DIAGNOSIS — G93.1 ANOXIC BRAIN DAMAGE, NOT ELSEWHERE CLASSIFIED: ICD-10-CM

## 2024-05-28 DIAGNOSIS — I46.8 CARDIAC ARREST DUE TO OTHER UNDERLYING CONDITION: ICD-10-CM

## 2024-05-28 DIAGNOSIS — R78.81 BACTEREMIA: ICD-10-CM

## 2024-05-28 DIAGNOSIS — K76.82 HEPATIC ENCEPHALOPATHY: ICD-10-CM

## 2024-05-28 DIAGNOSIS — E87.0 HYPEROSMOLALITY AND HYPERNATREMIA: ICD-10-CM
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
## 2024-05-28 DIAGNOSIS — M79.7 FIBROMYALGIA: ICD-10-CM

## 2024-05-28 DIAGNOSIS — K56.7 ILEUS, UNSPECIFIED: ICD-10-CM

## 2024-05-28 DIAGNOSIS — K59.09 OTHER CONSTIPATION: ICD-10-CM

## 2024-05-28 DIAGNOSIS — K72.00 ACUTE AND SUBACUTE HEPATIC FAILURE WITHOUT COMA: ICD-10-CM

## 2024-05-28 DIAGNOSIS — M32.9 SYSTEMIC LUPUS ERYTHEMATOSUS, UNSPECIFIED: ICD-10-CM

## 2024-05-28 DIAGNOSIS — I10 ESSENTIAL (PRIMARY) HYPERTENSION: ICD-10-CM

## 2024-05-28 DIAGNOSIS — E78.5 HYPERLIPIDEMIA, UNSPECIFIED: ICD-10-CM

## 2024-05-28 DIAGNOSIS — J69.0 PNEUMONITIS DUE TO INHALATION OF FOOD AND VOMIT: ICD-10-CM

## 2024-05-28 DIAGNOSIS — M06.9 RHEUMATOID ARTHRITIS, UNSPECIFIED: ICD-10-CM

## 2024-05-28 DIAGNOSIS — Z66 DO NOT RESUSCITATE: ICD-10-CM

## 2024-05-28 DIAGNOSIS — R53.2 FUNCTIONAL QUADRIPLEGIA: ICD-10-CM

## 2024-05-28 DIAGNOSIS — F19.19 OTHER PSYCHOACTIVE SUBSTANCE ABUSE WITH UNSPECIFIED PSYCHOACTIVE SUBSTANCE-INDUCED DISORDER: ICD-10-CM

## 2024-05-28 DIAGNOSIS — T40.5X1A POISONING BY COCAINE, ACCIDENTAL (UNINTENTIONAL), INITIAL ENCOUNTER: ICD-10-CM

## 2024-05-28 DIAGNOSIS — K21.9 GASTRO-ESOPHAGEAL REFLUX DISEASE WITHOUT ESOPHAGITIS: ICD-10-CM

## 2024-05-28 DIAGNOSIS — I46.9 CARDIAC ARREST, CAUSE UNSPECIFIED: ICD-10-CM

## 2024-05-28 DIAGNOSIS — G93.6 CEREBRAL EDEMA: ICD-10-CM

## 2024-05-28 DIAGNOSIS — F41.9 ANXIETY DISORDER, UNSPECIFIED: ICD-10-CM

## 2024-05-29 LAB
NSE FLD-MCNC: 338.1 NG/ML — HIGH (ref 0–17.6)
NSE FLD-MCNC: 431.3 NG/ML — HIGH (ref 0–17.6)

## 2024-06-19 LAB
CULTURE RESULTS: ABNORMAL
SPECIMEN SOURCE: SIGNIFICANT CHANGE UP

## 2024-08-22 PROCEDURE — 81001 URINALYSIS AUTO W/SCOPE: CPT

## 2024-08-22 PROCEDURE — 96361 HYDRATE IV INFUSION ADD-ON: CPT

## 2024-08-22 PROCEDURE — 82550 ASSAY OF CK (CPK): CPT

## 2024-08-22 PROCEDURE — 83690 ASSAY OF LIPASE: CPT

## 2024-08-22 PROCEDURE — 80053 COMPREHEN METABOLIC PANEL: CPT

## 2024-08-22 PROCEDURE — 83615 LACTATE (LD) (LDH) ENZYME: CPT

## 2024-08-22 PROCEDURE — 84295 ASSAY OF SERUM SODIUM: CPT

## 2024-08-22 PROCEDURE — 80354 DRUG SCREENING FENTANYL: CPT

## 2024-08-22 PROCEDURE — 94003 VENT MGMT INPAT SUBQ DAY: CPT

## 2024-08-22 PROCEDURE — C1769: CPT

## 2024-08-22 PROCEDURE — 87640 STAPH A DNA AMP PROBE: CPT

## 2024-08-22 PROCEDURE — 95700 EEG CONT REC W/VID EEG TECH: CPT

## 2024-08-22 PROCEDURE — 80202 ASSAY OF VANCOMYCIN: CPT

## 2024-08-22 PROCEDURE — 82570 ASSAY OF URINE CREATININE: CPT

## 2024-08-22 PROCEDURE — P9047: CPT

## 2024-08-22 PROCEDURE — 93005 ELECTROCARDIOGRAM TRACING: CPT

## 2024-08-22 PROCEDURE — 84540 ASSAY OF URINE/UREA-N: CPT

## 2024-08-22 PROCEDURE — 85027 COMPLETE CBC AUTOMATED: CPT

## 2024-08-22 PROCEDURE — 70450 CT HEAD/BRAIN W/O DYE: CPT | Mod: MC

## 2024-08-22 PROCEDURE — 86900 BLOOD TYPING SEROLOGIC ABO: CPT

## 2024-08-22 PROCEDURE — 84132 ASSAY OF SERUM POTASSIUM: CPT

## 2024-08-22 PROCEDURE — 71275 CT ANGIOGRAPHY CHEST: CPT | Mod: MC

## 2024-08-22 PROCEDURE — 80307 DRUG TEST PRSMV CHEM ANLYZR: CPT

## 2024-08-22 PROCEDURE — 85730 THROMBOPLASTIN TIME PARTIAL: CPT

## 2024-08-22 PROCEDURE — 82248 BILIRUBIN DIRECT: CPT

## 2024-08-22 PROCEDURE — 83520 IMMUNOASSAY QUANT NOS NONAB: CPT

## 2024-08-22 PROCEDURE — 84588 ASSAY OF VASOPRESSIN: CPT

## 2024-08-22 PROCEDURE — 84484 ASSAY OF TROPONIN QUANT: CPT

## 2024-08-22 PROCEDURE — 82150 ASSAY OF AMYLASE: CPT

## 2024-08-22 PROCEDURE — 87635 SARS-COV-2 COVID-19 AMP PRB: CPT

## 2024-08-22 PROCEDURE — 87070 CULTURE OTHR SPECIMN AEROBIC: CPT

## 2024-08-22 PROCEDURE — C1894: CPT

## 2024-08-22 PROCEDURE — 86901 BLOOD TYPING SEROLOGIC RH(D): CPT

## 2024-08-22 PROCEDURE — 84156 ASSAY OF PROTEIN URINE: CPT

## 2024-08-22 PROCEDURE — 84439 ASSAY OF FREE THYROXINE: CPT

## 2024-08-22 PROCEDURE — C1889: CPT

## 2024-08-22 PROCEDURE — 82803 BLOOD GASES ANY COMBINATION: CPT

## 2024-08-22 PROCEDURE — 94640 AIRWAY INHALATION TREATMENT: CPT

## 2024-08-22 PROCEDURE — 83036 HEMOGLOBIN GLYCOSYLATED A1C: CPT

## 2024-08-22 PROCEDURE — 80048 BASIC METABOLIC PNL TOTAL CA: CPT

## 2024-08-22 PROCEDURE — 87086 URINE CULTURE/COLONY COUNT: CPT

## 2024-08-22 PROCEDURE — 94002 VENT MGMT INPAT INIT DAY: CPT

## 2024-08-22 PROCEDURE — 86923 COMPATIBILITY TEST ELECTRIC: CPT

## 2024-08-22 PROCEDURE — 87102 FUNGUS ISOLATION CULTURE: CPT

## 2024-08-22 PROCEDURE — 87641 MR-STAPH DNA AMP PROBE: CPT

## 2024-08-22 PROCEDURE — 82330 ASSAY OF CALCIUM: CPT

## 2024-08-22 PROCEDURE — 83930 ASSAY OF BLOOD OSMOLALITY: CPT

## 2024-08-22 PROCEDURE — 84702 CHORIONIC GONADOTROPIN TEST: CPT

## 2024-08-22 PROCEDURE — 95708 EEG WO VID EA 12-26HR UNMNTR: CPT

## 2024-08-22 PROCEDURE — 76700 US EXAM ABDOM COMPLETE: CPT

## 2024-08-22 PROCEDURE — P9016: CPT

## 2024-08-22 PROCEDURE — 36430 TRANSFUSION BLD/BLD COMPNT: CPT

## 2024-08-22 PROCEDURE — 96374 THER/PROPH/DIAG INJ IV PUSH: CPT

## 2024-08-22 PROCEDURE — 71045 X-RAY EXAM CHEST 1 VIEW: CPT

## 2024-08-22 PROCEDURE — 74177 CT ABD & PELVIS W/CONTRAST: CPT | Mod: MC

## 2024-08-22 PROCEDURE — 82553 CREATINE MB FRACTION: CPT

## 2024-08-22 PROCEDURE — C8929: CPT

## 2024-08-22 PROCEDURE — C1887: CPT

## 2024-08-22 PROCEDURE — 83735 ASSAY OF MAGNESIUM: CPT

## 2024-08-22 PROCEDURE — 36415 COLL VENOUS BLD VENIPUNCTURE: CPT

## 2024-08-22 PROCEDURE — 87077 CULTURE AEROBIC IDENTIFY: CPT

## 2024-08-22 PROCEDURE — 0225U NFCT DS DNA&RNA 21 SARSCOV2: CPT

## 2024-08-22 PROCEDURE — 82140 ASSAY OF AMMONIA: CPT

## 2024-08-22 PROCEDURE — 99291 CRITICAL CARE FIRST HOUR: CPT

## 2024-08-22 PROCEDURE — 80178 ASSAY OF LITHIUM: CPT

## 2024-08-22 PROCEDURE — 80074 ACUTE HEPATITIS PANEL: CPT

## 2024-08-22 PROCEDURE — 83935 ASSAY OF URINE OSMOLALITY: CPT

## 2024-08-22 PROCEDURE — 85025 COMPLETE CBC W/AUTO DIFF WBC: CPT

## 2024-08-22 PROCEDURE — 83605 ASSAY OF LACTIC ACID: CPT

## 2024-08-22 PROCEDURE — 87040 BLOOD CULTURE FOR BACTERIA: CPT

## 2024-08-22 PROCEDURE — 84300 ASSAY OF URINE SODIUM: CPT

## 2024-08-22 PROCEDURE — 85384 FIBRINOGEN ACTIVITY: CPT

## 2024-08-22 PROCEDURE — 86850 RBC ANTIBODY SCREEN: CPT

## 2024-08-22 PROCEDURE — 87150 DNA/RNA AMPLIFIED PROBE: CPT

## 2024-08-22 PROCEDURE — 80076 HEPATIC FUNCTION PANEL: CPT

## 2024-08-22 PROCEDURE — 84133 ASSAY OF URINE POTASSIUM: CPT

## 2024-08-22 PROCEDURE — 74176 CT ABD & PELVIS W/O CONTRAST: CPT | Mod: MC

## 2024-08-22 PROCEDURE — 96375 TX/PRO/DX INJ NEW DRUG ADDON: CPT

## 2024-08-22 PROCEDURE — 85610 PROTHROMBIN TIME: CPT

## 2024-08-22 PROCEDURE — 84100 ASSAY OF PHOSPHORUS: CPT

## 2024-08-22 PROCEDURE — 71250 CT THORAX DX C-: CPT | Mod: MC

## 2024-08-22 PROCEDURE — 82962 GLUCOSE BLOOD TEST: CPT

## 2025-03-25 NOTE — PATIENT PROFILE ADULT - INTERNATIONAL TRAVEL
Radiation Oncology Treatment Summary    Patient Name:  Amalia Roa  MRN:  [id number]  :  1966    Radiation Oncologist: Shayna Nolan MD   Referring Provider: No ref. provider found  Primary Care Provider: CHARAN SNIDER MA    Brief History: Amalia Roa is a 58 y.o. female with No matching staging information was found for the patient..  The patient completed radiotherapy as outlined below.    Radiation Treatment Summary:    Radiation Treatments       Active   No active radiation treatments to show.     Completed   BOT + B neck (Started on 3/11/2025)   Most recent fraction: 600 cGy given on 3/21/2025   Total given: 3,000 cGy / 3,000 cGy  (5 of 5 fractions)   Elapsed Days: 10   Technique: VMAT                   Concurrent Chemotherapy:  Pembrolizumab + PACLitaxel / CARBOplatin, 21 Day Cycles - Head and Neck   Treatment goal [No plan goal]   Treatment line [No plan line of treatment]   Status Active   Start Date 2025 (Planned)   End Date 2025 (Planned)   Treatment Medications CARBOplatin (Paraplatin) in sodium chloride 0.9% 100 mL IV, , intravenous, Once, 0 of 6 cycles    PACLitaxeL (Taxol) 306 mg in dextrose 5% 301 mL IV, 175 mg/m2 = 306 mg, intravenous, Once, 0 of 6 cycles    methylPREDNISolone sod succinate (SOLU-Medrol) 40 mg/mL injection 40 mg, 40 mg, intravenous, As needed, 0 of 6 cycles    pembrolizumab (Keytruda) 200 mg in sodium chloride 0.9% 108 mL IV, 200 mg, intravenous, Once, 0 of 6 cycles          CTCAE Toxicity Overview:   Toxicity Assessment          3/17/2025    14:51   Toxicity Assessment   Adverse Events Reviewed (WDL) No (Exceptions to WDL)   Treatment  and neck   Anorexia Grade 0       First weight during RT   Dysphagia Grade 2       PEG TUBE+ ORAL intake   Mucositis Oral Grade 0       Glutamine handout provided   Aspiration Grade 0       PT will be scoped on 3-21-25   Hoarseness Grade 0   Voice Alteration Grade 0     Patient Disposition:   Future Appointments        Date / Time Provider Department Dept Phone    4/9/2025 8:40 AM (Arrive by 8:25 AM) [facility] 01 [facility] [phone]    4/9/2025 9:00 AM (Arrive by 8:45 AM) LAURIE Lozano-CNP [facility] [phone]    4/9/2025 9:30 AM INF 17 [facility] [phone]    4/11/2025 8:45 AM Morro Monatnez MD [facility] [phone]    4/30/2025 10:00 AM (Arrive by 9:45 AM) [facility] 01 [facility] [phone]    4/30/2025 10:30 AM (Arrive by 10:15 AM) LAURIE Lozano-CNP [facility] [phone]    4/30/2025 11:15 AM INF 27 [facility] [phone]    5/15/2025 10:15 AM (Arrive by 10:00 AM) POR CT 1 [facility] [phone]    5/15/2025 10:30 AM (Arrive by 10:15 AM) POR CT 1 [facility] [phone]    5/15/2025 11:00 AM (Arrive by 10:45 AM) POR CT 1 [facility] [phone]    5/20/2025 9:00 AM (Arrive by 8:45 AM) [facility] 01 [facility] [phone]    5/20/2025 9:40 AM (Arrive by 9:25 AM) Pauline Garcia MD [facility] [phone]    5/20/2025 10:15 AM INF 12 [facility] [phone]    5/20/2025 11:00 AM LAURIE Downs-CNP [facility] [phone]          Follow up order placed, just awaiting appointment scheduling.    No

## 2025-04-07 NOTE — PATIENT PROFILE ADULT - FUNCTIONAL ASSESSMENT - DAILY ACTIVITY 4.
unable to assess, pt unresponsive This document is complete and the patient is ready for discharge. [place] unable to assess, pt unresponsive/4 = No assist / stand by assistance

## (undated) DEVICE — ELCTR STRYKER NEPTUNE SMOKE EVACUATION PENCIL (GREEN)

## (undated) DEVICE — PACK EXPLORATORY LAPAROTOMY

## (undated) DEVICE — SUT ETHIBOND EXCEL 2 30" OS-4